# Patient Record
Sex: MALE | Race: WHITE | NOT HISPANIC OR LATINO | Employment: FULL TIME | URBAN - METROPOLITAN AREA
[De-identification: names, ages, dates, MRNs, and addresses within clinical notes are randomized per-mention and may not be internally consistent; named-entity substitution may affect disease eponyms.]

---

## 2017-01-09 ENCOUNTER — OFFICE VISIT (OUTPATIENT)
Dept: OPHTHALMOLOGY | Facility: CLINIC | Age: 37
End: 2017-01-09
Payer: COMMERCIAL

## 2017-01-09 DIAGNOSIS — E10.9 DIABETES TYPE 1, NO OCULAR INVOLVEMENT: ICD-10-CM

## 2017-01-09 DIAGNOSIS — E10.9 CONTROLLED DIABETES MELLITUS TYPE 1 WITHOUT COMPLICATIONS: Primary | ICD-10-CM

## 2017-01-09 DIAGNOSIS — H52.7 REFRACTIVE ERROR: ICD-10-CM

## 2017-01-09 PROCEDURE — 92015 DETERMINE REFRACTIVE STATE: CPT | Mod: S$GLB,,, | Performed by: OPTOMETRIST

## 2017-01-09 PROCEDURE — 92014 COMPRE OPH EXAM EST PT 1/>: CPT | Mod: S$GLB,,, | Performed by: OPTOMETRIST

## 2017-01-09 PROCEDURE — 99999 PR PBB SHADOW E&M-EST. PATIENT-LVL II: CPT | Mod: PBBFAC,,, | Performed by: OPTOMETRIST

## 2017-01-09 NOTE — MR AVS SNAPSHOT
"    [facility]  [address] [place] [address]  Phone: [phone]  Fax: [phone]                  Nino Arreguin   2017 3:30 PM   Office Visit    Description:  Male : 1980   Provider:  PATSY Lorenzo OD   Department:  [facility]           Reason for Visit     Diabetic Eye Exam           Diagnoses this Visit        Comments    Controlled diabetes mellitus type 1 without complications    -  Primary     Diabetes type 1, no ocular involvement         Refractive error                To Do List           Goals (5 Years of Data)     None      Follow-Up and Disposition     Return in about 1 year (around 2018).      [facility]     [facility] Nurse Care Line -  Assistance  Registered nurses in the [facility] provide clinical advisement, health education, appointment booking, and other advisory services.  Call for this free service at [phone].             Medications           Message regarding Medications     Verify the changes and/or additions to your medication regime listed below are the same as discussed with your clinician today.  If any of these changes or additions are incorrect, please notify your healthcare provider.             Verify that the below list of medications is an accurate representation of the medications you are currently taking.  If none reported, the list may be blank. If incorrect, please contact your healthcare provider. Carry this list with you in case of emergency.           Current Medications     blood sugar diagnostic (ONETOUCH ULTRA TEST) Strp 1 strip 8 times daily    insulin glargine (LANTUS) 100 unit/mL injection Inject 30 Units into the skin once daily.    insulin syringe-needle U-100 0.3 mL 30 x 5/16" Syrg 1 Syringe by Misc.(Non-Drug; Combo Route) route 6 (six) times daily.           Clinical Reference Information           Allergies as of 2017     No Known Allergies      Immunizations " Administered on Date of Encounter - 1/9/2017     None

## 2017-01-09 NOTE — PROGRESS NOTES
HPI     Last [facility] exam 04/25/2016  Diabetic/ 01/09/2017  Screening for glaucoma  CL update       Last edited by Mariano Gaines MA on 1/9/2017  3:28 PM.         Assessment /Plan     For exam results, see Encounter Report.    Controlled diabetes mellitus type 1 without complications    Diabetes type 1, no ocular involvement    Refractive error      No diabetic retinopathy in Type 1 diabetes patient.  OH OK OU.  Updated spec and CL RX.  RTC one yeaR.

## 2017-01-13 DIAGNOSIS — E11.9 TYPE 2 DIABETES MELLITUS WITHOUT COMPLICATION: ICD-10-CM

## 2017-07-28 RX ORDER — BLOOD SUGAR DIAGNOSTIC
STRIP MISCELLANEOUS
Qty: 250 STRIP | Refills: 3 | Status: SHIPPED | OUTPATIENT
Start: 2017-07-28 | End: 2018-01-23 | Stop reason: SDUPTHER

## 2017-11-10 ENCOUNTER — IMMUNIZATION (OUTPATIENT)
Dept: FAMILY MEDICINE | Facility: CLINIC | Age: 37
End: 2017-11-10
Payer: COMMERCIAL

## 2017-11-10 PROCEDURE — 90686 IIV4 VACC NO PRSV 0.5 ML IM: CPT | Mod: S$GLB,,, | Performed by: FAMILY MEDICINE

## 2017-11-10 PROCEDURE — 90471 IMMUNIZATION ADMIN: CPT | Mod: S$GLB,,, | Performed by: FAMILY MEDICINE

## 2017-11-29 DIAGNOSIS — J30.9 CHRONIC ALLERGIC RHINITIS, UNSPECIFIED SEASONALITY, UNSPECIFIED TRIGGER: Primary | ICD-10-CM

## 2017-12-04 ENCOUNTER — OFFICE VISIT (OUTPATIENT)
Dept: ALLERGY | Facility: CLINIC | Age: 37
End: 2017-12-04
Payer: COMMERCIAL

## 2017-12-04 VITALS
BODY MASS INDEX: 24.78 KG/M2 | HEART RATE: 74 BPM | HEIGHT: 75 IN | TEMPERATURE: 98 F | DIASTOLIC BLOOD PRESSURE: 74 MMHG | WEIGHT: 199.31 LBS | RESPIRATION RATE: 15 BRPM | SYSTOLIC BLOOD PRESSURE: 110 MMHG

## 2017-12-04 DIAGNOSIS — J30.89 ALLERGIC RHINITIS DUE TO DERMATOPHAGOIDES PTERONYSSINUS: ICD-10-CM

## 2017-12-04 DIAGNOSIS — H10.13 ALLERGIC CONJUNCTIVITIS OF BOTH EYES: ICD-10-CM

## 2017-12-04 DIAGNOSIS — J30.89 ALLERGIC RHINITIS DUE TO DERMATOPHAGOIDES FARINAE: ICD-10-CM

## 2017-12-04 DIAGNOSIS — J30.89 CHRONIC NONSEASONAL ALLERGIC RHINITIS DUE TO POLLEN: Primary | ICD-10-CM

## 2017-12-04 DIAGNOSIS — E10.9 CONTROLLED DIABETES MELLITUS TYPE 1 WITHOUT COMPLICATIONS: ICD-10-CM

## 2017-12-04 PROCEDURE — 95004 PERQ TESTS W/ALRGNC XTRCS: CPT | Mod: S$GLB,,, | Performed by: ALLERGY & IMMUNOLOGY

## 2017-12-04 PROCEDURE — 99999 PR PBB SHADOW E&M-EST. PATIENT-LVL III: CPT | Mod: PBBFAC,,, | Performed by: ALLERGY & IMMUNOLOGY

## 2017-12-04 PROCEDURE — 99204 OFFICE O/P NEW MOD 45 MIN: CPT | Mod: 25,S$GLB,, | Performed by: ALLERGY & IMMUNOLOGY

## 2017-12-04 RX ORDER — MONTELUKAST SODIUM 10 MG/1
10 TABLET ORAL DAILY
Qty: 30 TABLET | Refills: 12 | Status: SHIPPED | OUTPATIENT
Start: 2017-12-04 | End: 2018-01-03

## 2017-12-04 NOTE — LETTER
December 5, 2017      Lena Go MD  [address]  [place] [address]           [facility]  [address] [facility] [address]  [place] [address]  Phone: [phone]  Fax: [phone]          Patient: Nino Arreguin   MR Number: [id number]   YOB: 1980   Date of Visit: 12/4/2017       Dear Dr. Lena Go:    Thank you for referring Nino Arreguin to me for evaluation. Attached you will find relevant portions of my assessment and plan of care.    If you have questions, please do not hesitate to call me. I look forward to following Nino Arreguin along with you.    Sincerely,    Nils Ellington MD    Enclosure  CC:  No Recipients    If you would like to receive this communication electronically, please contact [email or web address] or [phone] to request more information on [employer] Link access.    For providers and/or their staff who would like to refer a patient to [facility], please contact us through our one-stop-shop provider referral line, [facility], at [phone].    If you feel you have received this communication in error or would no longer like to receive these types of communications, please e-mail [email or web address]

## 2017-12-05 NOTE — PROGRESS NOTES
Chief complaint:   ALLERGIES    This note was dictated using voice recognition software and may contain errors.    History:    He had a 2 PM appointment on Monday, December 4, 2017.  He stated that he has a lifelong history of perennial troublesome nasal symptoms.  Nasal obstruction is especially bothersome.  Typically he does not experience sensations of ear fullness or eustachian tube dysfunction.  He does experience itching of the eyes and excessive nasal mucus production.  He is suspicious that he is ALLERGIC to cats.  He does not believe that he is ALLERGIC to any foods.  Recently he has used Afrin nasal spray but not on a daily basis.  In the past he is taken Zyrtec daily and Claritin-D.  He is never evaluated Singulair.    Information in his medical record regarding his past medical history family history and social history was reviewed and updated today.  Significant additions if any are as noted above or below.    Social history: He lives in a house with 2 dogs indoors.  Carpeting in his present in his bedroom.  No one smokes indoors.  The house is not experienced any water damage.  He does not operate any room unit air filters, purifiers, or ionizers.  He works for a [employer].    Past medical history: He has no history of GE reflux nose or sinus surgery facial fractures asthma hypertension heart, liver, kidney or thyroid disease.  He stated that the age 15 he developed diabetes.  He received treatment with insulin.    Family history is positive for rhinitis and negative for asthma.    Review of systems: See above.  No additional new symptoms were mentioned at the time of his appointment this afternoon.  He stated in early childhood he believes he may have had some type of ALLERGY evaluation performed.  He does not recall the results.  He is interested in pursuing evaluation at this time.    Exam:    In general he is in no distress.  He is alert oriented well-developed well-groomed  attentive and in good mood  Gait steady  Skin no rash noted  Head no swelling noted  Eyes sclera white conjunctiva pink  Nose patent no polyps seen  Mouth no swelling or inflammation of the lips tongue or in the throat noted  Ears not inflamed tympanic membranes not inflamed  Neck no thyromegaly or masses noted  Lymph nodes no significant cervical or epitrochlear lymphadenopathy noted  Heart regular rhythm no murmurs heard  Lungs clear to auscultation  Extremities no swelling or inflammation of the hands or legs noted    ALLERGY testing:    He elected to have diagnostic immediate hypersensitivity skin tests performed.  Prick skin tests were performed on the volar forearms.  36 airborne allergens were tested along with histamine and saline.  These tests were personally evaluated and interpreted by myself 15 minutes after they were performed.  The histamine control was positive.  The saline control was negative.  Multiple positive skin test reactions were noted.  Cat and dog reacted positively as stated 2 different house dust mites and 6 grass pollens.  1 tree pollen and 2 weed pollens reacted equivocally.  I reviewed the results of the tests with him.    Impression:    #1 ALLERGIC rhinitis  #2 ALLERGIC conjunctivitis  #3 type 1 diabetes receiving treatment  #4 other health concerns as noted in his medical record    Assessment and plan:    Using anatomical teaching models of the nose and had I reviewed anatomy and pathophysiology with him.    Treatment options for ALLERGIC respiratory disease were discussed in detail.  I cautioned him not to use decongestant nasal sprays such as Afrin excessively.    Avoidance measures were discussed and emphasized.  We discussed dust mite avoidance in detail.  He was given printed material regarding dust mite avoidance measures.    The use of symptomatic medication was discussed.  Initially, I have suggested he evaluate the use of Singulair 10 mg 1 pill once a day.  Potential side  effects were reviewed.  At his request a prescription was issued and was transmitted electronically to his pharmacy.  He will take 1 pill once a day in the evening.  I requested that he call in 3 or 4 weeks and inform me as to whether or not he found the medication to be helpful.  Should Singulair not be helpful additional options regarding treatment may be made.    We discussed allergen immunotherapy.  Immunotherapy will not be instituted at this time.  It will remain potential treatment option to consider in particular should he continue to be symptomatic despite the use of appropriate medication.    His appointment was 50 minutes in duration spent entirely in face-to-face contact.  More than 50% of the visit was spent in counseling and coordination of care.  An additional 15 minutes were required for the performance and evaluation of his immediate hypersensitivity skin tests.    He was given the office phone number.  Should he have additional questions or concerns he was instructed to call.

## 2017-12-28 RX ORDER — FLUTICASONE PROPIONATE 50 MCG
2 SPRAY, SUSPENSION (ML) NASAL DAILY
Qty: 1 BOTTLE | Refills: 5 | Status: SHIPPED | OUTPATIENT
Start: 2017-12-28 | End: 2019-08-12

## 2018-07-12 RX ORDER — LANCETS 28 GAUGE
1 EACH MISCELLANEOUS
Qty: 300 EACH | Refills: 5 | Status: SHIPPED | OUTPATIENT
Start: 2018-07-12 | End: 2020-11-13

## 2018-07-12 RX ORDER — INSULIN PUMP SYRINGE, 3 ML
EACH MISCELLANEOUS
Qty: 1 EACH | Refills: 0 | Status: SHIPPED | OUTPATIENT
Start: 2018-07-12 | End: 2018-08-08

## 2018-08-07 ENCOUNTER — PATIENT MESSAGE (OUTPATIENT)
Dept: FAMILY MEDICINE | Facility: CLINIC | Age: 38
End: 2018-08-07

## 2018-08-08 RX ORDER — INSULIN PUMP SYRINGE, 3 ML
EACH MISCELLANEOUS
Qty: 1 EACH | Refills: 0 | Status: SHIPPED | OUTPATIENT
Start: 2018-08-08 | End: 2020-11-13

## 2018-08-14 ENCOUNTER — PATIENT MESSAGE (OUTPATIENT)
Dept: FAMILY MEDICINE | Facility: CLINIC | Age: 38
End: 2018-08-14

## 2018-12-04 ENCOUNTER — LAB VISIT (OUTPATIENT)
Dept: LAB | Facility: HOSPITAL | Age: 38
End: 2018-12-04
Payer: COMMERCIAL

## 2018-12-04 ENCOUNTER — OFFICE VISIT (OUTPATIENT)
Dept: FAMILY MEDICINE | Facility: CLINIC | Age: 38
End: 2018-12-04
Payer: COMMERCIAL

## 2018-12-04 VITALS
DIASTOLIC BLOOD PRESSURE: 74 MMHG | OXYGEN SATURATION: 98 % | HEART RATE: 69 BPM | RESPIRATION RATE: 18 BRPM | HEIGHT: 75 IN | BODY MASS INDEX: 25.25 KG/M2 | TEMPERATURE: 98 F | WEIGHT: 203.06 LBS | SYSTOLIC BLOOD PRESSURE: 110 MMHG

## 2018-12-04 DIAGNOSIS — Z00.00 PREVENTATIVE HEALTH CARE: Primary | ICD-10-CM

## 2018-12-04 DIAGNOSIS — E10.9 CONTROLLED DIABETES MELLITUS TYPE 1 WITHOUT COMPLICATIONS: ICD-10-CM

## 2018-12-04 DIAGNOSIS — Z00.00 PREVENTATIVE HEALTH CARE: ICD-10-CM

## 2018-12-04 LAB
ALBUMIN SERPL BCP-MCNC: 3.9 G/DL
ALP SERPL-CCNC: 73 U/L
ALT SERPL W/O P-5'-P-CCNC: 21 U/L
ANION GAP SERPL CALC-SCNC: 7 MMOL/L
AST SERPL-CCNC: 19 U/L
BASOPHILS # BLD AUTO: 0.04 K/UL
BASOPHILS NFR BLD: 0.7 %
BILIRUB SERPL-MCNC: 0.6 MG/DL
BUN SERPL-MCNC: 12 MG/DL
CALCIUM SERPL-MCNC: 9 MG/DL
CHLORIDE SERPL-SCNC: 103 MMOL/L
CHOLEST SERPL-MCNC: 189 MG/DL
CHOLEST/HDLC SERPL: 3.9 {RATIO}
CO2 SERPL-SCNC: 31 MMOL/L
CREAT SERPL-MCNC: 1 MG/DL
DIFFERENTIAL METHOD: NORMAL
EOSINOPHIL # BLD AUTO: 0.1 K/UL
EOSINOPHIL NFR BLD: 2 %
ERYTHROCYTE [DISTWIDTH] IN BLOOD BY AUTOMATED COUNT: 13 %
EST. GFR  (AFRICAN AMERICAN): >60 ML/MIN/1.73 M^2
EST. GFR  (NON AFRICAN AMERICAN): >60 ML/MIN/1.73 M^2
ESTIMATED AVG GLUCOSE: 143 MG/DL
GLUCOSE SERPL-MCNC: 113 MG/DL
HBA1C MFR BLD HPLC: 6.6 %
HCT VFR BLD AUTO: 45.1 %
HDLC SERPL-MCNC: 48 MG/DL
HDLC SERPL: 25.4 %
HGB BLD-MCNC: 14.7 G/DL
IMM GRANULOCYTES # BLD AUTO: 0.02 K/UL
IMM GRANULOCYTES NFR BLD AUTO: 0.3 %
LDLC SERPL CALC-MCNC: 124.8 MG/DL
LYMPHOCYTES # BLD AUTO: 2.3 K/UL
LYMPHOCYTES NFR BLD: 37.5 %
MCH RBC QN AUTO: 29.2 PG
MCHC RBC AUTO-ENTMCNC: 32.6 G/DL
MCV RBC AUTO: 90 FL
MONOCYTES # BLD AUTO: 0.5 K/UL
MONOCYTES NFR BLD: 7.8 %
NEUTROPHILS # BLD AUTO: 3.1 K/UL
NEUTROPHILS NFR BLD: 51.7 %
NONHDLC SERPL-MCNC: 141 MG/DL
NRBC BLD-RTO: 0 /100 WBC
PLATELET # BLD AUTO: 238 K/UL
PMV BLD AUTO: 12 FL
POTASSIUM SERPL-SCNC: 4 MMOL/L
PROT SERPL-MCNC: 7 G/DL
RBC # BLD AUTO: 5.04 M/UL
SODIUM SERPL-SCNC: 141 MMOL/L
TRIGL SERPL-MCNC: 81 MG/DL
TSH SERPL DL<=0.005 MIU/L-ACNC: 0.79 UIU/ML
WBC # BLD AUTO: 6.05 K/UL

## 2018-12-04 PROCEDURE — 99999 PR PBB SHADOW E&M-EST. PATIENT-LVL IV: CPT | Mod: PBBFAC,,, | Performed by: FAMILY MEDICINE

## 2018-12-04 PROCEDURE — 80061 LIPID PANEL: CPT

## 2018-12-04 PROCEDURE — 84443 ASSAY THYROID STIM HORMONE: CPT

## 2018-12-04 PROCEDURE — 80053 COMPREHEN METABOLIC PANEL: CPT

## 2018-12-04 PROCEDURE — 36415 COLL VENOUS BLD VENIPUNCTURE: CPT | Mod: PO

## 2018-12-04 PROCEDURE — 85025 COMPLETE CBC W/AUTO DIFF WBC: CPT

## 2018-12-04 PROCEDURE — 99395 PREV VISIT EST AGE 18-39: CPT | Mod: S$GLB,,, | Performed by: FAMILY MEDICINE

## 2018-12-04 PROCEDURE — 83036 HEMOGLOBIN GLYCOSYLATED A1C: CPT

## 2018-12-04 RX ORDER — LISINOPRIL 5 MG/1
5 TABLET ORAL DAILY
Qty: 90 TABLET | Refills: 3 | Status: SHIPPED | OUTPATIENT
Start: 2018-12-04 | End: 2021-09-14 | Stop reason: SDUPTHER

## 2018-12-04 NOTE — PROGRESS NOTES
CHIEF COMPLAINT:  This is a 38-year-old male here for preventive health exam.    SUBJECTIVE: Patient is a type I diabetic with onset at age 16.  A1c 3 days ago was  6.7% 2 years ago.  Patient denies polyuria, polydipsia, polyphagia.  He reports higher blood sugars in the morning.  His weight is essentially unchanged with a BMI of 25.38.  He uses basal and bolus insulin.  He exercises regularly.    Eye exam January 2017.  Tdap April 2016.  Pneumovax November 1999.  Influenza vaccine September 2018.    ROS:  GENERAL:  Patient denies fever, chills, night sweats.  Patient denies weight gain or loss. Patient denies anorexia, fatigue, weakness or swollen glands.  SKIN: Patient denies rash or hair loss.  HEENT:  Patient denies sore throat, ear pain, hearing loss, nasal congestion, or runny nose. Patient denies visual disturbance, eye irritation or discharge.  LUNGS: Patient denies cough, wheeze or hemoptysis.  CARDIOVASCULAR: Patient denies chest pain, shortness of breath, palpitations, syncope or lower extremity edema.  GI:  Patient denies abdominal pain, nausea, vomiting, diarrhea, constipation, blood in stool or melena.  GENITOURINARY:  Patient denies dysuria, frequency, hematuria, nocturia, urgency or incontinence.  MUSCULOSKELETAL: Patient denies joint pain, swelling, redness or warmth.  NEUROLOGIC: Patient denies headache, vertigo, paresthesias, weakness in limb, dysarthria, dysphagia or abnormality of gait.  PSYCHIATRIC: Patient denies anxiety, depression, or memory loss.     OBJECTIVE:   GENERAL:  Well-developed well-nourished male alert and oriented x3, in no acute distress.  Memory, judgment and cognition without deficit.   SKIN: Clear without rash.  Normal color and tone.  HEENT: Eyes: Clear conjunctivae.  No scleral icterus.  Pupils equal reactive to light and accommodation.  Ears: Clear canals. Clear TMs.  Nose: Without congestion.  Pharynx: Without injection or exudates.  NECK: Supple, normal range of motion.   No masses, nodes or enlarged thyroid.  No JVD.  Carotids 2+ and equal.  No bruits.  LUNGS: Clear to auscultation.  Normal respiratory effort.  CARDIOVASCULAR: Regular rhythm, normal S1, S2 without murmur, gallop or rub.  BACK: No CVA or spinal tenderness.  ABDOMEN: Normal appearance.  Active bowel sounds.  Soft, nontender without mass or organomegaly.  No rebound or guarding.  EXTREMITIES: Without cyanosis, clubbing or edema.  Distal pulses 2+ and equal.  Normal range of motion in all extremities.  No joint effusion, erythema or warmth.  NEUROLOGIC:   Cranial nerves II through XII without deficit.  Motor strength equal bilaterally.  Sensation normal to touch.  Deep tendon reflexes 2+ and equal.  Gait without abnormality.  No tremor.  Negative cerebellar signs.    FOOT EVALUATION: 10 gram monofilament exam with protective sensation intact bilaterally. Nails appropriately trimmed. No ulcers. Distal pulses palpable.     ASSESSMENT:  1. Preventative health care    2. Controlled diabetes mellitus type 1 without complications      PLAN:   1.  Weight reduction.  Exercise regularly.  2.  Age-appropriate counseling.  3.  Fasting lab.  4.  Start lisinopril 5 mg daily.  5.  Patient to find out about coverage for CGM.    6.  Follow-up in 6 months.

## 2018-12-10 ENCOUNTER — OFFICE VISIT (OUTPATIENT)
Dept: OPHTHALMOLOGY | Facility: CLINIC | Age: 38
End: 2018-12-10
Payer: COMMERCIAL

## 2018-12-10 DIAGNOSIS — H52.13 MYOPIA OF BOTH EYES: ICD-10-CM

## 2018-12-10 DIAGNOSIS — E10.9 DIABETES TYPE 1, NO OCULAR INVOLVEMENT: Primary | ICD-10-CM

## 2018-12-10 DIAGNOSIS — Z13.5 GLAUCOMA SCREENING: ICD-10-CM

## 2018-12-10 PROCEDURE — 92014 COMPRE OPH EXAM EST PT 1/>: CPT | Mod: S$GLB,,, | Performed by: OPTOMETRIST

## 2018-12-10 PROCEDURE — 92015 DETERMINE REFRACTIVE STATE: CPT | Mod: S$GLB,,, | Performed by: OPTOMETRIST

## 2018-12-10 PROCEDURE — 99999 PR PBB SHADOW E&M-EST. PATIENT-LVL II: CPT | Mod: PBBFAC,,, | Performed by: OPTOMETRIST

## 2018-12-10 NOTE — PROGRESS NOTES
HPI     Diabetic Eye Exam      Additional comments: Yearly              Comments     NP to [facility]  Last seen by [facility] on 1/9/17 for yearly DM eye exam  No noticeable changes in vision since last eye exam  Wears CTL full-time   Current CTL are comfortable vision stable  Blood sugar stable  No other complaints  No drops    1. DM  2. CTL wearer          Last edited by Melody Weathers, PCT on 12/10/2018 10:14 AM.   (History)            Assessment /Plan     For exam results, see Encounter Report.    Diabetes type 1, no ocular involvement    Glaucoma screening    Myopia of both eyes      No diabetic retinopathy in either eye.  Glaucoma screening negative in both eyes.  Updated glasses and contact lens prescriptions.  Return to clinic 1 yr.

## 2018-12-10 NOTE — LETTER
December 10, 2018      Lena Go MD  [address]  [place] [address]           [facility]  [address]  [place] [address]  Phone: [phone]  Fax: [phone]          Patient: Nino Arreguin   MR Number: [id number]   YOB: 1980   Date of Visit: 12/10/2018       Dear Dr. Lena Go:    Thank you for referring Nino Arreguin to me for evaluation. Attached you will find relevant portions of my assessment and plan of care.    If you have questions, please do not hesitate to call me. I look forward to following Nino Arreguin along with you.    Sincerely,    Ketty Lorenzo, OD    Enclosure  CC:  No Recipients    If you would like to receive this communication electronically, please contact [email or web address] or [phone] to request more information on [employer] Link access.    For providers and/or their staff who would like to refer a patient to [facility], please contact us through our one-stop-shop provider referral line, [facility], at [phone].    If you feel you have received this communication in error or would no longer like to receive these types of communications, please e-mail [email or web address]

## 2019-02-25 RX ORDER — OSELTAMIVIR PHOSPHATE 75 MG/1
75 CAPSULE ORAL DAILY
Qty: 7 CAPSULE | Refills: 0 | Status: SHIPPED | OUTPATIENT
Start: 2019-02-25 | End: 2019-03-04

## 2019-06-14 ENCOUNTER — PATIENT OUTREACH (OUTPATIENT)
Dept: ADMINISTRATIVE | Facility: HOSPITAL | Age: 39
End: 2019-06-14

## 2019-06-14 NOTE — PROGRESS NOTES
I have attempted without success to contact this patient re  Diabetic F/U. No answer, Left         Abbi FERNANDEZ, CUCAN Care Coordinator  Care Coordination Department  [facility]  [phone]

## 2019-06-26 RX ORDER — PROMETHAZINE HYDROCHLORIDE AND CODEINE PHOSPHATE 6.25; 1 MG/5ML; MG/5ML
5 SOLUTION ORAL EVERY 4 HOURS PRN
Qty: 180 ML | Refills: 0
Start: 2019-06-26 | End: 2021-05-20 | Stop reason: SDUPTHER

## 2019-07-08 ENCOUNTER — OFFICE VISIT (OUTPATIENT)
Dept: FAMILY MEDICINE | Facility: CLINIC | Age: 39
End: 2019-07-08
Payer: COMMERCIAL

## 2019-07-08 ENCOUNTER — HOSPITAL ENCOUNTER (OUTPATIENT)
Dept: RADIOLOGY | Facility: HOSPITAL | Age: 39
Discharge: HOME | End: 2019-07-08
Attending: FAMILY MEDICINE
Payer: COMMERCIAL

## 2019-07-08 VITALS
HEART RATE: 72 BPM | TEMPERATURE: 98 F | SYSTOLIC BLOOD PRESSURE: 122 MMHG | WEIGHT: 199.94 LBS | HEIGHT: 75 IN | DIASTOLIC BLOOD PRESSURE: 88 MMHG | BODY MASS INDEX: 24.86 KG/M2 | OXYGEN SATURATION: 98 %

## 2019-07-08 DIAGNOSIS — R05.9 COUGH: ICD-10-CM

## 2019-07-08 DIAGNOSIS — R05.9 COUGH: Primary | ICD-10-CM

## 2019-07-08 PROCEDURE — 71046 X-RAY EXAM CHEST 2 VIEWS: CPT | Mod: TC,FY,PO

## 2019-07-08 PROCEDURE — 99213 PR OFFICE/OUTPT VISIT, EST, LEVL III, 20-29 MIN: ICD-10-PCS | Mod: S$GLB,,, | Performed by: FAMILY MEDICINE

## 2019-07-08 PROCEDURE — 71046 X-RAY EXAM CHEST 2 VIEWS: CPT | Mod: 26,,, | Performed by: RADIOLOGY

## 2019-07-08 PROCEDURE — 99999 PR PBB SHADOW E&M-EST. PATIENT-LVL III: ICD-10-PCS | Mod: PBBFAC,,, | Performed by: FAMILY MEDICINE

## 2019-07-08 PROCEDURE — 3008F BODY MASS INDEX DOCD: CPT | Mod: CPTII,S$GLB,, | Performed by: FAMILY MEDICINE

## 2019-07-08 PROCEDURE — 99999 PR PBB SHADOW E&M-EST. PATIENT-LVL III: CPT | Mod: PBBFAC,,, | Performed by: FAMILY MEDICINE

## 2019-07-08 PROCEDURE — 99213 OFFICE O/P EST LOW 20 MIN: CPT | Mod: S$GLB,,, | Performed by: FAMILY MEDICINE

## 2019-07-08 PROCEDURE — 3008F PR BODY MASS INDEX (BMI) DOCUMENTED: ICD-10-PCS | Mod: CPTII,S$GLB,, | Performed by: FAMILY MEDICINE

## 2019-07-08 PROCEDURE — 71046 XR CHEST PA AND LATERAL: ICD-10-PCS | Mod: 26,,, | Performed by: RADIOLOGY

## 2019-07-08 RX ORDER — PREDNISONE 20 MG/1
TABLET ORAL
Qty: 10 TABLET | Refills: 0 | Status: SHIPPED | OUTPATIENT
Start: 2019-07-08 | End: 2019-08-12

## 2019-07-08 RX ORDER — MONTELUKAST SODIUM 10 MG/1
10 TABLET ORAL NIGHTLY
Qty: 30 TABLET | Refills: 2 | Status: SHIPPED | OUTPATIENT
Start: 2019-07-08 | End: 2020-07-17

## 2019-07-08 NOTE — PROGRESS NOTES
CHIEF COMPLAINT:  This is a 38-year-old male complaining of persistent cough.    SUBJECTIVE:  Patient complains of a 4-5 week history of cough starting with postnasal drip which has been unrelenting.  Symptoms wax and wane within a 24 hr period.  Cough is nonproductive.  Recently patient has been having slight chest discomfort with coughing.  The cough is better at night when he lies down.  Patient denies heartburn or reflux.  He has no coughing while exercising.  Patient denies runny nose, nasal congestion, sore throat, ear pain, chest congestion, shortness of breath or wheezing.  He  has a history of allergic rhinitis.  He has been taking Zyrtec without relief.  He was seen at [facility] 1-1[facility]2 weeks ago and given a steroid injection which made virtually no difference in his symptoms.  He has also been taking Tessalon Perles and promethazine with codeine with temporary relief.  The patient has type 1 diabetes.    ROS:  GENERAL: Patient denies fever, chills, night sweats.  Patient denies weight gain or loss. Patient denies anorexia, fatigue, weakness or swollen glands.  SKIN: Patient denies rash.  HEENT: Patient denies sore throat, ear pain, hearing loss, nasal congestion, or runny nose. Patient denies visual disturbance, eye irritation or discharge.  LUNGS: Patient denies wheeze or hemoptysis.  Positive for cough.    CARDIOVASCULAR: Patient denies chest pain, shortness of breath, palpitations, syncope or lower extremity edema.  GI: Patient denies abdominal pain, nausea, vomiting, diarrhea, constipation, blood in stool or melena.    OBJECTIVE:   GENERAL: Well-developed well-nourished white male alert and oriented x3 in no acute distress.  Memory, judgment and cognition without deficit.  SKIN: Clear without rash.  Normal color and tone.  HEENT: Eyes: Clear conjunctivae.  No scleral icterus.  Ears: Clear canals.  Clear TMs.  Nose: Without congestion.  Pharynx: Without injection or exudates.  NECK: Supple,  normal range of motion.  No lymphadenopathy.  No masses or enlarged thyroid.  No JVD.  Carotids 2+ and equal.  No bruits.  LUNGS: Clear to auscultation.  Normal respiratory effort.  CARDIOVASCULAR: Regular rhythm, normal S1, S2 without murmur, gallop or rub.    Chest x-ray:  Clear.    ASSESSMENT:  1. Cough      PLAN:   1.  Singular 10 mg daily.  2.  Short prednisone taper starting at 40 mg over 1 week.  3.  Cautioned regarding elevation of blood sugar due to steroids.  Adjust insulin dose according to blood sugar.  4.  Continue Zyrtec.  5.  Follow-up if no improvement or worsening symptoms.    This note is generated with speech recognition software and is subject to transcription error and sound alike phrases that may be missed by proofreading.

## 2019-07-12 ENCOUNTER — PATIENT OUTREACH (OUTPATIENT)
Dept: ADMINISTRATIVE | Facility: HOSPITAL | Age: 39
End: 2019-07-12

## 2019-07-12 DIAGNOSIS — E10.9 CONTROLLED DIABETES MELLITUS TYPE 1 WITHOUT COMPLICATIONS: Primary | ICD-10-CM

## 2019-07-12 NOTE — PROGRESS NOTES
Pt States he on Vacation and will schedule when he return         Abbi FERNANDEZ LPN Care Coordinator  Care Coordination Department  [facility]  [phone]

## 2019-07-23 ENCOUNTER — TELEPHONE (OUTPATIENT)
Dept: FAMILY MEDICINE | Facility: CLINIC | Age: 39
End: 2019-07-23

## 2019-07-23 NOTE — TELEPHONE ENCOUNTER
----- Message from Anisha Jernigan MA sent at 7/23/2019 12:23 PM CDT -----  Contact: Casi Wren      ----- Message -----  From: Polly Severino LPN  Sent: 7/22/2019   4:43 PM  To: Anisha Jernigan MA, Romulo Torres LPN        ----- Message -----  From: Estelle Duggan  Sent: 7/22/2019  12:27 PM  To: [facility] Staff    They are missing certificate of med necessity and chart notes, please fax them to [phone]. Thank you

## 2019-07-24 ENCOUNTER — HOSPITAL ENCOUNTER (OUTPATIENT)
Dept: RADIOLOGY | Facility: HOSPITAL | Age: 39
Discharge: HOME | End: 2019-07-24
Attending: ALLERGY & IMMUNOLOGY
Payer: COMMERCIAL

## 2019-07-24 ENCOUNTER — OFFICE VISIT (OUTPATIENT)
Dept: ALLERGY | Facility: CLINIC | Age: 39
End: 2019-07-24
Payer: COMMERCIAL

## 2019-07-24 VITALS
TEMPERATURE: 97 F | HEART RATE: 74 BPM | RESPIRATION RATE: 15 BRPM | SYSTOLIC BLOOD PRESSURE: 110 MMHG | WEIGHT: 200.19 LBS | HEIGHT: 75 IN | DIASTOLIC BLOOD PRESSURE: 80 MMHG | BODY MASS INDEX: 24.89 KG/M2

## 2019-07-24 DIAGNOSIS — R05.9 COUGH: ICD-10-CM

## 2019-07-24 DIAGNOSIS — J30.1 SEASONAL ALLERGIC RHINITIS DUE TO POLLEN: ICD-10-CM

## 2019-07-24 DIAGNOSIS — J30.1 NON-SEASONAL ALLERGIC RHINITIS DUE TO POLLEN: ICD-10-CM

## 2019-07-24 DIAGNOSIS — R05.9 COUGH: Primary | ICD-10-CM

## 2019-07-24 PROCEDURE — 99999 PR PBB SHADOW E&M-EST. PATIENT-LVL III: ICD-10-PCS | Mod: PBBFAC,,, | Performed by: ALLERGY & IMMUNOLOGY

## 2019-07-24 PROCEDURE — 70220 X-RAY EXAM OF SINUSES: CPT | Mod: TC

## 2019-07-24 PROCEDURE — 99999 PR PBB SHADOW E&M-EST. PATIENT-LVL III: CPT | Mod: PBBFAC,,, | Performed by: ALLERGY & IMMUNOLOGY

## 2019-07-24 PROCEDURE — 99215 OFFICE O/P EST HI 40 MIN: CPT | Mod: S$GLB,,, | Performed by: ALLERGY & IMMUNOLOGY

## 2019-07-24 PROCEDURE — 3008F BODY MASS INDEX DOCD: CPT | Mod: CPTII,S$GLB,, | Performed by: ALLERGY & IMMUNOLOGY

## 2019-07-24 PROCEDURE — 99215 PR OFFICE/OUTPT VISIT, EST, LEVL V, 40-54 MIN: ICD-10-PCS | Mod: S$GLB,,, | Performed by: ALLERGY & IMMUNOLOGY

## 2019-07-24 PROCEDURE — 3008F PR BODY MASS INDEX (BMI) DOCUMENTED: ICD-10-PCS | Mod: CPTII,S$GLB,, | Performed by: ALLERGY & IMMUNOLOGY

## 2019-07-24 PROCEDURE — 70220 XR SINUSES MIN 3 VIEWS: ICD-10-PCS | Mod: 26,,, | Performed by: RADIOLOGY

## 2019-07-24 PROCEDURE — 70220 X-RAY EXAM OF SINUSES: CPT | Mod: 26,,, | Performed by: RADIOLOGY

## 2019-07-25 ENCOUNTER — TELEPHONE (OUTPATIENT)
Dept: ALLERGY | Facility: CLINIC | Age: 39
End: 2019-07-25

## 2019-07-25 ENCOUNTER — TELEPHONE (OUTPATIENT)
Dept: FAMILY MEDICINE | Facility: CLINIC | Age: 39
End: 2019-07-25

## 2019-07-25 ENCOUNTER — TELEPHONE (OUTPATIENT)
Dept: ALLERGY | Facility: CLINIC | Age: 39
End: 2019-07-25
Payer: COMMERCIAL

## 2019-07-25 NOTE — PROGRESS NOTES
Chief complaint:  Cough, allergies    This note was dictated using voice recognition software and may contain errors.    History:  He had a 4:00 p.m. appointment on Wednesday July 24, 2019.  He was accompanied by his wife.  She was present throughout the entire appointment.    He had an appointment with me on December 4, 2017.  Information in that note was reviewed and updated today.  Please refer to the results of immediate hypersensitivity prick skin tests performed December 4, 2017.  He was allergic to multiple grass pollens in 2 house dust mites plus additional allergens.  We reviewed the results of his tests.    Information in his medical record regarding his past medical history, family history, and social history was reviewed and updated today.  Significant addition see if any are as noted below.    He stated approximately Carly 10 he became ill.  He developed a sore throat and posterior rhinorrhea.  He has been symptomatic since that time with the exception of perhaps 1 or 2 days when he felt somewhat improved.  He stated when he improved he was on vacation in [place].  His coughing decreased only to redeveloped.    He is received treatment at an urgent care facility June 21.  He received intramuscular corticosteroids an oral medicine and continued to be symptomatic.  Subsequently he was seen on July 8 by .  He received additional treatment with prednisone and continues to be symptomatic.  A chest x-ray was performed which was satisfactory.    Review of systems:  At the time of his appointment this afternoon he does experience some coughing.  He stated in 2018 and 2019 when he has been well that he does experience symptoms which she attributes to his allergic sensitivities.    In December of 2017 I had suggested he evaluate Singulair.  He cannot recall with certainty if he took Singulair or if he did whether not he found to be helpful.    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    Past medical history:  He has diabetes and is receiving treatment.  He has no history of asthma.    Exam:    In general he is in no distress.  He is alert oriented well-developed in good mood and attentive  Gait steady  Skin no rash noted  Head no swelling noted  Eyes scleral white conjunctiva pink  Nose patent no polyp seen  Mouth no swelling or inflammation of the lips tongue or in the throat noted  Ears not inflamed tympanic membranes not inflamed  Neck no masses or thyromegaly noted  Lymph nodes no significant cervical or epitrochlear lymphadenopathy noted  Heart no murmurs heard regular rhythm  Lungs clear to auscultation.  Good air exchange.  No wheezing or abnormal sounds heard    Extremities no swelling or inflammation of the hands legs noted    Impression:    1.  Cough, exact cause uncertain, perhaps multifactorial in origin  2.  Allergic rhinitis  3.  Diabetes, receiving treatment  4.  Other health concerns as noted in his medical record    Assessment plan:    I told him I am concerned that he may have developed sinusitis.  I recommended the sinus x-rays be performed.  Arrangements were made to have x-rays performed after his appointment with me today.  When the results are available to review with him I will do so.  Additional recommendations may be made at that time.    I told him it is not uncommon to experience the development of sinusitis after an upper respiratory tract infectious illness.  On Carly 10 he experienced the onset of a sore throat an increase in posterior rhinorrhea.    He stated he is interested in considering additional treatment for his allergic respiratory symptoms.  We discussed options for treatment.  We discussed the use of symptomatic medication.  We discussed oral and subcutaneous immuno therapy.  Potential benefits potential risks associated with immunotherapy were discussed.  He was informed that fatalities have occurred with systemic  allergic reactions occurring in association with the administration of allergen immunotherapy injections.    Additional recommendations regarding treatment will be made once results of the sinus x-rays are known.    He was given the office phone number.  Should he have additional questions or concerns she was instructed to call.    His appointment was 40 min in duration spent entirely in face-to-face contact.  More than 50% of the visit was spent in counseling and coordination of care.

## 2019-07-25 NOTE — TELEPHONE ENCOUNTER
I called him at 6:19 p.m. on Thursday July 25, 2019.  I was calling to review the report of the sinus x-rays performed after his appointment with me yesterday.  I did not reach him and did not speak with him.

## 2019-07-25 NOTE — TELEPHONE ENCOUNTER
----- Message from Naida Peace sent at 7/25/2019  9:05 AM CDT -----  Contact: Mrs. Arreguin-Spouse   Mrs. Arreguin requesting a call back regarding  a needed procedure code for Nino Arreguin. Please call back at [phone].      Thanks,  Naida Peace  7/25/2019

## 2019-07-25 NOTE — TELEPHONE ENCOUNTER
Faxed patient's [facility] Supp[employer] paperwork back to [phone], along with current medication list, problem list with diagnosis codes, and last chart note from annual exam in December 2018./LA

## 2019-07-25 NOTE — TELEPHONE ENCOUNTER
Called [facility], [facility] was unavailable. I left a message advising that the patient's completed form has been faxed back over./DENIAW    ----- Message from Margie Barros sent at 7/25/2019  8:40 AM CDT -----  Contact: [facility])-[phone]  Would like to consult with nurse regarding a fax sent and recieved for a meter, please call back at [phone]. Thanks/ar

## 2019-07-26 ENCOUNTER — TELEPHONE (OUTPATIENT)
Dept: ALLERGY | Facility: CLINIC | Age: 39
End: 2019-07-26

## 2019-07-26 DIAGNOSIS — J30.1 ALLERGIC RHINITIS DUE TO POLLEN, UNSPECIFIED SEASONALITY: Primary | ICD-10-CM

## 2019-07-26 RX ORDER — AZELASTINE 1 MG/ML
1 SPRAY, METERED NASAL 2 TIMES DAILY
Qty: 30 ML | Refills: 3 | Status: SHIPPED | OUTPATIENT
Start: 2019-07-26 | End: 2022-03-31

## 2019-07-26 NOTE — TELEPHONE ENCOUNTER
I completed my telephone conversation with him at 2:46 p.m. on July 26, 2019.  I informed him of the normal results of his sinus x-rays performed on July 24.    He stated he has not taken any Lisinopril since January 2019.  I reviewed with him that Ace inhibitors, such as Lisinopril, can cause a cough.  Again, he has not been taking this medication in recent months.    He stated he has observed when he goes to bed and is flat on his back that his cough stops occurring.  He stated coughing through the evening and night when in bed is not a problem.  He stated upon awakening in the morning in getting out of bed that the cough will redeveloped.  We discussed potential anatomical concerns which conceivably could contribute to causing an individual to experience coughing.    I discussed with him that Astelin nasal spray may be helpful for treating nasal symptoms and cough.  He has requested a prescription be transmitted to his pharmacy.  This has been done.  He may use 1 or 2 sprays in each nasal passage as often as every 12 hr.  I reviewed with him guidelines for using the medicine.  Potential side effects were discussed.    We discussed cough variant asthma.  Should he continue to be symptomatic with cough I told him I will recommend that pulmonary function tests be performed.    Should he continue to cough I told him it may be appropriate to recommended a chest x-ray be repeated.    I requested he call July 29 or 30 and report upon his status.    This note was dictated using voice recognition software and may contain errors.

## 2019-07-29 ENCOUNTER — TELEPHONE (OUTPATIENT)
Dept: FAMILY MEDICINE | Facility: CLINIC | Age: 39
End: 2019-07-29

## 2019-07-29 NOTE — TELEPHONE ENCOUNTER
Spoke with robert and she states that pt needs to have a sooner appt for diabetes in order for him to get supplies. Pt scheduled for 8/12/19.

## 2019-07-29 NOTE — TELEPHONE ENCOUNTER
----- Message from Ciara Pittman sent at 7/29/2019  3:50 PM CDT -----  Contact: robert-diabetes management supplies  Type:  Needs Medical Advice    Who Called: robert  Symptoms (please be specific): n/a   How long has patient had these symptoms: n/a  Pharmacy name and phone #: n/a  Would the patient rather a call back or a response via [facility]? Call back  Best Call Back Number: [phone]  Additional Information: requesting call back regarding getting update on pt documents that was requested.    Thanks,  Ciara Pittman

## 2019-07-30 ENCOUNTER — PATIENT MESSAGE (OUTPATIENT)
Dept: ALLERGY | Facility: CLINIC | Age: 39
End: 2019-07-30

## 2019-07-30 ENCOUNTER — TELEPHONE (OUTPATIENT)
Dept: ALLERGY | Facility: CLINIC | Age: 39
End: 2019-07-30

## 2019-07-30 NOTE — TELEPHONE ENCOUNTER
I finished my telephone conversation with him at 5:44 p.m. on July 19.  He he stated Astelin is helpful.  He stated coughing has been greatly reduced since Saturday July 27.  He continues to experience some nasal obstruction.  I suggested he continue to use the medicine along with Singulair.  I requested he call August 6 or 7 and upon his status.    He is giving some consideration to instituting immunotherapy.  We discussed immunotherapy.  We its administration in frequency of administration.    Should have additional questions or concerns she was instructed to call.

## 2019-08-05 ENCOUNTER — PATIENT OUTREACH (OUTPATIENT)
Dept: ADMINISTRATIVE | Facility: HOSPITAL | Age: 39
End: 2019-08-05

## 2019-08-05 ENCOUNTER — TELEPHONE (OUTPATIENT)
Dept: FAMILY MEDICINE | Facility: CLINIC | Age: 39
End: 2019-08-05

## 2019-08-05 DIAGNOSIS — E10.9 CONTROLLED DIABETES MELLITUS TYPE 1 WITHOUT COMPLICATIONS: Primary | ICD-10-CM

## 2019-08-05 NOTE — TELEPHONE ENCOUNTER
Told by Chantelle that patient's medical necessity form was missing date of last visit, method of delivery, if the pt has had diabetes education within the last 6 months, and the prescription start date. For the chart notes they are missing 4x testing, 3 multiple daily injections, and if the patient has hypoglycemia. She will fax over the completed form to us to correct and send back with updated chart notes./LA    ----- Message from Leonor Nolan sent at 8/5/2019  1:15 PM CDT -----  Contact: Bon/Diabetic Mgmt Supplies  Please call Bon @ [phone], Ext [phone], need to know status of pt chart notes,medical necessity complete by doctor.

## 2019-08-05 NOTE — PROGRESS NOTES
PreVisit Chart Audit Perfomed       Abbi FERNANDEZ LPN Care Coordinator  Care Coordination Department  [facility]  [phone]

## 2019-08-07 ENCOUNTER — TELEPHONE (OUTPATIENT)
Dept: FAMILY MEDICINE | Facility: CLINIC | Age: 39
End: 2019-08-07

## 2019-08-09 ENCOUNTER — TELEPHONE (OUTPATIENT)
Dept: FAMILY MEDICINE | Facility: CLINIC | Age: 39
End: 2019-08-09

## 2019-08-09 NOTE — TELEPHONE ENCOUNTER
----- Message from Shelia Coles sent at 8/9/2019  9:55 AM CDT -----  Contact: [facility] Management [facility] Supplies  Di[facility] Supplies is calling checking to status to confirm that the fax was received  On incomplete medical necessity statement and chart notes fax on pt. Ph#[phone]   [id number]           .Thank You  Tabby Coles

## 2019-08-12 ENCOUNTER — OFFICE VISIT (OUTPATIENT)
Dept: FAMILY MEDICINE | Facility: CLINIC | Age: 39
End: 2019-08-12
Payer: COMMERCIAL

## 2019-08-12 ENCOUNTER — LAB VISIT (OUTPATIENT)
Dept: LAB | Facility: HOSPITAL | Age: 39
End: 2019-08-12
Payer: COMMERCIAL

## 2019-08-12 ENCOUNTER — TELEPHONE (OUTPATIENT)
Dept: FAMILY MEDICINE | Facility: CLINIC | Age: 39
End: 2019-08-12

## 2019-08-12 VITALS
TEMPERATURE: 98 F | SYSTOLIC BLOOD PRESSURE: 128 MMHG | HEIGHT: 75 IN | HEART RATE: 78 BPM | DIASTOLIC BLOOD PRESSURE: 88 MMHG | BODY MASS INDEX: 24.91 KG/M2 | OXYGEN SATURATION: 97 % | WEIGHT: 200.38 LBS

## 2019-08-12 DIAGNOSIS — J30.89 NON-SEASONAL ALLERGIC RHINITIS, UNSPECIFIED TRIGGER: ICD-10-CM

## 2019-08-12 DIAGNOSIS — E10.65 UNCONTROLLED TYPE 1 DIABETES MELLITUS WITH HYPERGLYCEMIA: Primary | ICD-10-CM

## 2019-08-12 DIAGNOSIS — E10.9 CONTROLLED DIABETES MELLITUS TYPE 1 WITHOUT COMPLICATIONS: ICD-10-CM

## 2019-08-12 LAB
ANION GAP SERPL CALC-SCNC: 7 MMOL/L (ref 8–16)
BUN SERPL-MCNC: 17 MG/DL (ref 6–20)
CALCIUM SERPL-MCNC: 10 MG/DL (ref 8.7–10.5)
CHLORIDE SERPL-SCNC: 103 MMOL/L (ref 95–110)
CHOLEST SERPL-MCNC: 226 MG/DL (ref 120–199)
CHOLEST/HDLC SERPL: 4.5 {RATIO} (ref 2–5)
CO2 SERPL-SCNC: 31 MMOL/L (ref 23–29)
CREAT SERPL-MCNC: 1 MG/DL (ref 0.5–1.4)
EST. GFR  (AFRICAN AMERICAN): >60 ML/MIN/1.73 M^2
EST. GFR  (NON AFRICAN AMERICAN): >60 ML/MIN/1.73 M^2
ESTIMATED AVG GLUCOSE: 169 MG/DL (ref 68–131)
GLUCOSE SERPL-MCNC: 74 MG/DL (ref 70–110)
HBA1C MFR BLD HPLC: 7.5 % (ref 4–5.6)
HDLC SERPL-MCNC: 50 MG/DL (ref 40–75)
HDLC SERPL: 22.1 % (ref 20–50)
LDLC SERPL CALC-MCNC: 153.6 MG/DL (ref 63–159)
NONHDLC SERPL-MCNC: 176 MG/DL
POTASSIUM SERPL-SCNC: 4.3 MMOL/L (ref 3.5–5.1)
SODIUM SERPL-SCNC: 141 MMOL/L (ref 136–145)
TRIGL SERPL-MCNC: 112 MG/DL (ref 30–150)

## 2019-08-12 PROCEDURE — 99999 PR PBB SHADOW E&M-EST. PATIENT-LVL III: ICD-10-PCS | Mod: PBBFAC,,, | Performed by: FAMILY MEDICINE

## 2019-08-12 PROCEDURE — 99214 OFFICE O/P EST MOD 30 MIN: CPT | Mod: S$GLB,,, | Performed by: FAMILY MEDICINE

## 2019-08-12 PROCEDURE — 3044F HG A1C LEVEL LT 7.0%: CPT | Mod: CPTII,S$GLB,, | Performed by: FAMILY MEDICINE

## 2019-08-12 PROCEDURE — 80048 BASIC METABOLIC PNL TOTAL CA: CPT

## 2019-08-12 PROCEDURE — 99214 PR OFFICE/OUTPT VISIT, EST, LEVL IV, 30-39 MIN: ICD-10-PCS | Mod: S$GLB,,, | Performed by: FAMILY MEDICINE

## 2019-08-12 PROCEDURE — 3008F PR BODY MASS INDEX (BMI) DOCUMENTED: ICD-10-PCS | Mod: CPTII,S$GLB,, | Performed by: FAMILY MEDICINE

## 2019-08-12 PROCEDURE — 80061 LIPID PANEL: CPT

## 2019-08-12 PROCEDURE — 3044F PR MOST RECENT HEMOGLOBIN A1C LEVEL <7.0%: ICD-10-PCS | Mod: CPTII,S$GLB,, | Performed by: FAMILY MEDICINE

## 2019-08-12 PROCEDURE — 99999 PR PBB SHADOW E&M-EST. PATIENT-LVL III: CPT | Mod: PBBFAC,,, | Performed by: FAMILY MEDICINE

## 2019-08-12 PROCEDURE — 36415 COLL VENOUS BLD VENIPUNCTURE: CPT | Mod: PO

## 2019-08-12 PROCEDURE — 83036 HEMOGLOBIN GLYCOSYLATED A1C: CPT

## 2019-08-12 PROCEDURE — 3008F BODY MASS INDEX DOCD: CPT | Mod: CPTII,S$GLB,, | Performed by: FAMILY MEDICINE

## 2019-08-12 NOTE — PROGRESS NOTES
CHIEF COMPLAINT:  This is a 38-year-old male here for follow-up type 1 diabetes.    SUBJECTIVE:  The patient has type 1 diabetes and takes basal/bolus insulin daily:  LEVEMIR 30 units nightly and HUMALOG 30-35 units with each meal and as needed.  Blood sugar varies from a low of 50, 3 to 4 times a week to a high of 300 once a week.  He reports an average blood sugar of 170.  HE CHECKS HIS BLOOD SUGAR AT LEAST 7 TIMES PER DAY AND GIVES HIMSELF MULTIPLE INJECTIONS OF BOLUS INSULIN AS NEEDED.  He denies polyuria, polydipsia or polyphagia.  Last A1c was 6.6% 8 months ago.  Last eye exam December 2018.  The patient stopped taking lisinopril because he could not remember to take medication.  He recently had had flare-up of allergic rhinitis with associated persistent cough.  He has been evaluated by allergist and was told to stay off ACE-inhibitor for now.  The patient exercises regularly.    ROS:  GENERAL:  Patient denies fever, chills, night sweats.  Patient denies weight gain or loss. Patient denies anorexia, fatigue, weakness or swollen glands.  SKIN: Patient denies rash.  HEENT:  Patient denies sore throat, ear pain, hearing loss, nasal congestion, or runny nose. Patient denies visual disturbance, eye irritation or discharge.  LUNGS: Patient denies cough, wheeze or hemoptysis.  CARDIOVASCULAR: Patient denies chest pain, shortness of breath, palpitations, syncope or lower extremity edema.  GI:  Patient denies abdominal pain, nausea, vomiting, diarrhea, constipation, blood in stool or melena.  GENITOURINARY:  Patient denies dysuria, frequency, hematuria, nocturia, urgency or incontinence.  MUSCULOSKELETAL: Patient denies joint pain, swelling, redness or warmth.  NEUROLOGIC: Patient denies headache, vertigo, paresthesias, weakness in limb, dysarthria, dysphagia or abnormality of gait.  PSYCHIATRIC: Patient denies anxiety, depression, or memory loss.     OBJECTIVE:   GENERAL:  Well-developed well-nourished male alert and  oriented x3, in no acute distress.  Memory, judgment and cognition without deficit.   SKIN: Clear without rash.  Normal color and tone.  HEENT: Eyes: Clear conjunctivae.  No scleral icterus.    NECK: Supple, normal range of motion.  No masses, nodes or enlarged thyroid.  No JVD.  Carotids 2+ and equal.  No bruits.  LUNGS: Clear to auscultation.  Normal respiratory effort.  CARDIOVASCULAR: Regular rhythm, normal S1, S2 without murmur, gallop or rub.  BACK: No CVA or spinal tenderness.  ABDOMEN: Soft, nontender without mass or organomegaly.  No rebound or guarding.  EXTREMITIES: Without cyanosis, clubbing or edema.  Distal pulses 2+ and equal.  Normal range of motion in all extremities.  No joint effusion, erythema or warmth.  NEUROLOGIC:  Gait without abnormality.  No tremor.    FOOT EVALUATION: 10 gram monofilament exam with protective sensation intact bilaterally. Nails appropriately trimmed. No ulcers. Distal pulses palpable.    ASSESSMENT:  1. Controlled diabetes mellitus type 1 without complications    2. Non-seasonal allergic rhinitis, unspecified trigger      PLAN:  1.  Weight reduction. Exercise regularly.  2.  Check A1c, lipid panel and BMP.  3.  Refill bolus insulin.  4.  CGM ordered to improve diabetic control.    Addendum:  LAB REVIEWED AND ACCEPTABLE EXCEPT FOR A1C 7.5% AUGUST 12, 2019.  DIABETIC GOAL = LESS THAN 7%.    This note is generated with speech recognition software and is subject to transcription error and sound alike phrases that may be missed by proofreading.

## 2019-08-12 NOTE — TELEPHONE ENCOUNTER
----- Message from Anisha Lorenzo sent at 8/12/2019 10:49 AM CDT -----  Contact: Lena-Diabetes Management [facility] Supplies  Requesting a call back regarding patient. She states they need the patient's CNM, clincial notes faxed to [phone]. If needed please call Lena back at [phone] ext [phone]

## 2019-08-14 ENCOUNTER — TELEPHONE (OUTPATIENT)
Dept: FAMILY MEDICINE | Facility: CLINIC | Age: 39
End: 2019-08-14

## 2019-08-14 NOTE — TELEPHONE ENCOUNTER
I'm sorry. I sent his progress note in after his visit. I wasn't aware that you still had his form to be completed.  /Reuben

## 2019-08-14 NOTE — TELEPHONE ENCOUNTER
What note are they talking about?  I did not authorize that my note be sent.  I was waiting for A1c results and have not filled out the form either.

## 2019-08-14 NOTE — TELEPHONE ENCOUNTER
Cierra told me that the chart notes have no indication of hyperglycemia. It also doesn't indicate what bolus injection is currently being used. That information is required to be documented in the chart notes. They're asking if the doctor can addend that information and we send in the updated chart notes. /DENIAW    ----- Message from Yessenia Catalan sent at 8/14/2019 11:49 AM CDT -----  Contact: Raven with diabetes management and supplies  Type: Needs Medical Advice    Who Called:  Raven with diabetes management and supplies  Symptoms (please be specific):  na  How long has patient had these symptoms:  rocio  Pharmacy name and phone #:  rocio  Best Call Back Number: [phone] ext [phone]  Additional Information: calling to discuss the documents that were received on 8/13, states that there is important information missing that is needed. Fax: [phone] call to discuss, thank you!

## 2019-08-16 ENCOUNTER — TELEPHONE (OUTPATIENT)
Dept: FAMILY MEDICINE | Facility: CLINIC | Age: 39
End: 2019-08-16

## 2019-08-16 NOTE — TELEPHONE ENCOUNTER
----- Message from Margie Barros sent at 8/16/2019  9:01 AM CDT -----  Contact: Diabetes Supplies(Arturo)-[phone]  Would like to consult with nurse regarding office note, hypo and Mdi note. Please call back at [phone]. Thanks/ar

## 2019-08-19 ENCOUNTER — TELEPHONE (OUTPATIENT)
Dept: FAMILY MEDICINE | Facility: CLINIC | Age: 39
End: 2019-08-19

## 2019-08-19 NOTE — TELEPHONE ENCOUNTER
Left a message asking Kirsty to give us a call back regarding request for patient./xuanw    ----- Message from Naida Peace sent at 8/19/2019  9:54 AM CDT -----  Contact: Jan-diabetes management   Requesting a narrative in chart notes that includes injection indication.Please call [phone] ext [phone]. Please fax to [phone]. (she could not verify the pts address)      Thanks,  Naida Peace

## 2019-08-23 ENCOUNTER — TELEPHONE (OUTPATIENT)
Dept: FAMILY MEDICINE | Facility: CLINIC | Age: 39
End: 2019-08-23

## 2019-08-23 NOTE — TELEPHONE ENCOUNTER
Left a message asking them to give us a call back./xuanw  ----- Message from Leonor Nolan sent at 8/23/2019  9:16 AM CDT -----  Contact: Diabetic Mgmt/  T  Please call Chantelle @ [phone], Ext [phone] regarding pt diabetic medication Dexcom, states its being pending in notes.

## 2020-02-07 DIAGNOSIS — E11.9 TYPE 2 DIABETES MELLITUS WITHOUT COMPLICATION: ICD-10-CM

## 2020-02-20 DIAGNOSIS — E11.9 TYPE 2 DIABETES MELLITUS WITHOUT COMPLICATION: ICD-10-CM

## 2020-05-02 DIAGNOSIS — Z00.00 PREVENTATIVE HEALTH CARE: Primary | ICD-10-CM

## 2020-05-04 ENCOUNTER — LAB VISIT (OUTPATIENT)
Dept: LAB | Facility: HOSPITAL | Age: 40
End: 2020-05-04
Payer: COMMERCIAL

## 2020-05-04 DIAGNOSIS — Z00.00 PREVENTATIVE HEALTH CARE: ICD-10-CM

## 2020-05-04 DIAGNOSIS — E11.9 TYPE 2 DIABETES MELLITUS WITHOUT COMPLICATION: ICD-10-CM

## 2020-05-04 LAB
ESTIMATED AVG GLUCOSE: 131 MG/DL (ref 68–131)
HBA1C MFR BLD HPLC: 6.2 % (ref 4–5.6)

## 2020-05-04 PROCEDURE — 36415 COLL VENOUS BLD VENIPUNCTURE: CPT | Mod: PO

## 2020-05-04 PROCEDURE — 83036 HEMOGLOBIN GLYCOSYLATED A1C: CPT

## 2020-05-04 PROCEDURE — 86703 HIV-1/HIV-2 1 RESULT ANTBDY: CPT

## 2020-05-05 LAB — HIV 1+2 AB+HIV1 P24 AG SERPL QL IA: NEGATIVE

## 2020-05-11 ENCOUNTER — PATIENT OUTREACH (OUTPATIENT)
Dept: ADMINISTRATIVE | Facility: OTHER | Age: 40
End: 2020-05-11

## 2020-05-12 ENCOUNTER — OFFICE VISIT (OUTPATIENT)
Dept: OPHTHALMOLOGY | Facility: CLINIC | Age: 40
End: 2020-05-12
Payer: COMMERCIAL

## 2020-05-12 DIAGNOSIS — Z46.0 ENCOUNTER FOR FITTING OR ADJUSTMENT OF SPECTACLES OR CONTACT LENSES: ICD-10-CM

## 2020-05-12 DIAGNOSIS — E10.9 TYPE 1 DIABETES MELLITUS WITHOUT RETINOPATHY: Primary | ICD-10-CM

## 2020-05-12 DIAGNOSIS — H52.13 MYOPIA, BILATERAL: ICD-10-CM

## 2020-05-12 LAB
LEFT EYE DM RETINOPATHY: NEGATIVE
RIGHT EYE DM RETINOPATHY: NEGATIVE

## 2020-05-12 PROCEDURE — 92015 PR REFRACTION: ICD-10-PCS | Mod: S$GLB,,, | Performed by: OPTOMETRIST

## 2020-05-12 PROCEDURE — 92014 PR EYE EXAM, EST PATIENT,COMPREHESV: ICD-10-PCS | Mod: S$GLB,,, | Performed by: OPTOMETRIST

## 2020-05-12 PROCEDURE — 99999 PR PBB SHADOW E&M-EST. PATIENT-LVL I: CPT | Mod: PBBFAC,,, | Performed by: OPTOMETRIST

## 2020-05-12 PROCEDURE — 92310 CONTACT LENS FITTING OU: CPT | Mod: CSM,S$GLB,, | Performed by: OPTOMETRIST

## 2020-05-12 PROCEDURE — 92310 PR CONTACT LENS FITTING (NO CHANGE): ICD-10-PCS | Mod: CSM,S$GLB,, | Performed by: OPTOMETRIST

## 2020-05-12 PROCEDURE — 99999 PR PBB SHADOW E&M-EST. PATIENT-LVL I: ICD-10-PCS | Mod: PBBFAC,,, | Performed by: OPTOMETRIST

## 2020-05-12 PROCEDURE — 92015 DETERMINE REFRACTIVE STATE: CPT | Mod: S$GLB,,, | Performed by: OPTOMETRIST

## 2020-05-12 PROCEDURE — 92014 COMPRE OPH EXAM EST PT 1/>: CPT | Mod: S$GLB,,, | Performed by: OPTOMETRIST

## 2020-05-12 NOTE — PROGRESS NOTES
HPI     NIDDM exam.  No visual complaints.  Last eye exam 12/10/2018 [facility].  Last eye visit with [facility] 04/22/2014.  Update glasses and contact lenses RX.  Discussed fee to update contact lenses.  Lab Results       Component                Value               Date                       HGBA1C                   6.2 (H)             05/04/2020        Last edited by Robert Perkins, OD on 5/12/2020  8:13 AM. (History)            Assessment /Plan     For exam results, see Encounter Report.    Type 1 diabetes mellitus without retinopathy    Myopia, bilateral    Encounter for fitting or adjustment of spectacles or contact lenses      No Background Diabetic Retinopathy    Discussed CL charges.  Dispense Final Rx for glasses  No changes CL Rx  RTC 1 year  Discussed above and answered questions.

## 2020-07-17 ENCOUNTER — OFFICE VISIT (OUTPATIENT)
Dept: FAMILY MEDICINE | Facility: CLINIC | Age: 40
End: 2020-07-17
Payer: COMMERCIAL

## 2020-07-17 ENCOUNTER — LAB VISIT (OUTPATIENT)
Dept: LAB | Facility: HOSPITAL | Age: 40
End: 2020-07-17
Payer: COMMERCIAL

## 2020-07-17 VITALS
DIASTOLIC BLOOD PRESSURE: 70 MMHG | TEMPERATURE: 99 F | OXYGEN SATURATION: 98 % | HEART RATE: 87 BPM | SYSTOLIC BLOOD PRESSURE: 106 MMHG | BODY MASS INDEX: 25.36 KG/M2 | RESPIRATION RATE: 18 BRPM | HEIGHT: 75 IN | WEIGHT: 203.94 LBS

## 2020-07-17 DIAGNOSIS — E10.9 CONTROLLED DIABETES MELLITUS TYPE 1 WITHOUT COMPLICATIONS: ICD-10-CM

## 2020-07-17 DIAGNOSIS — J30.89 NON-SEASONAL ALLERGIC RHINITIS, UNSPECIFIED TRIGGER: ICD-10-CM

## 2020-07-17 DIAGNOSIS — Z01.818 PREOP EXAMINATION: Primary | ICD-10-CM

## 2020-07-17 DIAGNOSIS — Z01.818 PREOP EXAMINATION: ICD-10-CM

## 2020-07-17 LAB
BASOPHILS # BLD AUTO: 0.03 K/UL (ref 0–0.2)
BASOPHILS NFR BLD: 0.5 % (ref 0–1.9)
DIFFERENTIAL METHOD: NORMAL
EOSINOPHIL # BLD AUTO: 0.2 K/UL (ref 0–0.5)
EOSINOPHIL NFR BLD: 2.5 % (ref 0–8)
ERYTHROCYTE [DISTWIDTH] IN BLOOD BY AUTOMATED COUNT: 12.8 % (ref 11.5–14.5)
HCT VFR BLD AUTO: 45.4 % (ref 40–54)
HGB BLD-MCNC: 15 G/DL (ref 14–18)
IMM GRANULOCYTES # BLD AUTO: 0.02 K/UL (ref 0–0.04)
IMM GRANULOCYTES NFR BLD AUTO: 0.3 % (ref 0–0.5)
LYMPHOCYTES # BLD AUTO: 2.1 K/UL (ref 1–4.8)
LYMPHOCYTES NFR BLD: 33.6 % (ref 18–48)
MCH RBC QN AUTO: 29.2 PG (ref 27–31)
MCHC RBC AUTO-ENTMCNC: 33 G/DL (ref 32–36)
MCV RBC AUTO: 89 FL (ref 82–98)
MONOCYTES # BLD AUTO: 0.4 K/UL (ref 0.3–1)
MONOCYTES NFR BLD: 6.9 % (ref 4–15)
NEUTROPHILS # BLD AUTO: 3.6 K/UL (ref 1.8–7.7)
NEUTROPHILS NFR BLD: 56.2 % (ref 38–73)
NRBC BLD-RTO: 0 /100 WBC
PLATELET # BLD AUTO: 212 K/UL (ref 150–350)
PMV BLD AUTO: 12.9 FL (ref 9.2–12.9)
RBC # BLD AUTO: 5.13 M/UL (ref 4.6–6.2)
WBC # BLD AUTO: 6.34 K/UL (ref 3.9–12.7)

## 2020-07-17 PROCEDURE — 99214 OFFICE O/P EST MOD 30 MIN: CPT | Mod: S$GLB,,, | Performed by: FAMILY MEDICINE

## 2020-07-17 PROCEDURE — 99214 PR OFFICE/OUTPT VISIT, EST, LEVL IV, 30-39 MIN: ICD-10-PCS | Mod: S$GLB,,, | Performed by: FAMILY MEDICINE

## 2020-07-17 PROCEDURE — 85025 COMPLETE CBC W/AUTO DIFF WBC: CPT

## 2020-07-17 PROCEDURE — 99999 PR PBB SHADOW E&M-EST. PATIENT-LVL IV: ICD-10-PCS | Mod: PBBFAC,,, | Performed by: FAMILY MEDICINE

## 2020-07-17 PROCEDURE — 93005 EKG 12-LEAD: ICD-10-PCS | Mod: S$GLB,,, | Performed by: FAMILY MEDICINE

## 2020-07-17 PROCEDURE — 93010 ELECTROCARDIOGRAM REPORT: CPT | Mod: S$GLB,,, | Performed by: INTERNAL MEDICINE

## 2020-07-17 PROCEDURE — 93005 ELECTROCARDIOGRAM TRACING: CPT | Mod: S$GLB,,, | Performed by: FAMILY MEDICINE

## 2020-07-17 PROCEDURE — 99999 PR PBB SHADOW E&M-EST. PATIENT-LVL IV: CPT | Mod: PBBFAC,,, | Performed by: FAMILY MEDICINE

## 2020-07-17 PROCEDURE — 93010 EKG 12-LEAD: ICD-10-PCS | Mod: S$GLB,,, | Performed by: INTERNAL MEDICINE

## 2020-07-17 PROCEDURE — 36415 COLL VENOUS BLD VENIPUNCTURE: CPT | Mod: PO

## 2020-07-17 PROCEDURE — 80048 BASIC METABOLIC PNL TOTAL CA: CPT

## 2020-07-17 NOTE — PROGRESS NOTES
CHIEF COMPLAINT:  This is a 39-year-old male here for preoperative clearance for right shoulder arthroscopy, debridement of labrum and open biceps tenodesis per Dr. Kiel Washington due to patient's chronic medical conditions.     SUBJECTIVE: The patient has had persistent pain and decreased range of motion in right shoulder for months.  MRI showed torn labrum. The patient has had no previous problems with anesthesia.  He denies family history of perioperative complications. Patient is a type I diabetic with onset at age 16.  A1c was 6.2% 2 months ago. He uses basal and bolus insulin.  He monitors glucose continuously with CGM. He denies polyuria, polydipsia and polyphagia. Patient takes lisinopril 5 mg daily for kidney protection.  His weight is essentially unchanged with a BMI of 25.49.  He has a history of allergic rhinitis.         ROS:  GENERAL:  Patient denies fever, chills, night sweats.  Patient denies weight gain or loss. Patient denies anorexia, fatigue, weakness or swollen glands.  SKIN: Patient denies rash or hair loss.  HEENT:  Patient denies sore throat, ear pain, hearing loss, nasal congestion, or runny nose. Patient denies visual disturbance, eye irritation or discharge.  LUNGS: Patient denies cough, wheeze or hemoptysis.  CARDIOVASCULAR: Patient denies chest pain, shortness of breath, palpitations, syncope or lower extremity edema.  GI:  Patient denies abdominal pain, nausea, vomiting, diarrhea, constipation, blood in stool or melena.  GENITOURINARY:  Patient denies dysuria, frequency, hematuria, nocturia, urgency or incontinence.  MUSCULOSKELETAL: Patient denies joint pain, swelling, redness or warmth.  NEUROLOGIC: Patient denies headache, vertigo, paresthesias, weakness in limb, dysarthria, dysphagia or abnormality of gait.  PSYCHIATRIC: Patient denies anxiety, depression, or memory loss.     OBJECTIVE:   GENERAL:  Well-developed well-nourished male alert and oriented x3, in no acute distress.   Memory, judgment and cognition without deficit.   SKIN: Clear without rash.  Normal color and tone.  HEENT: Eyes: Clear conjunctivae.  No scleral icterus.  Pupils equal reactive to light and accommodation.  Ears: Clear canals. Clear TMs.  Nose: Without congestion.  Pharynx: Without injection or exudates.  NECK: Supple, normal range of motion.  No masses, nodes or enlarged thyroid.  No JVD.  Carotids 2+ and equal.  No bruits.  LUNGS: Clear to auscultation.  Normal respiratory effort.  CARDIOVASCULAR: Regular rhythm, normal S1, S2 without murmur, gallop or rub.  BACK: No CVA or spinal tenderness.  ABDOMEN: Normal appearance.  Active bowel sounds.  Soft, nontender without mass or organomegaly.  No rebound or guarding.  EXTREMITIES: Without cyanosis, clubbing or edema.  Distal pulses 2+ and equal.  Normal range of motion in all extremities.  No joint effusion, erythema or warmth.  NEUROLOGIC:   Cranial nerves II through XII without deficit.  Motor strength equal bilaterally.  Sensation normal to touch.  Deep tendon reflexes 2+ and equal.  Gait without abnormality.  No tremor.      Lab:  Acceptable except for elevated glucose.  EKG:  Normal sinus rhythm.  Possible left atrial enlargement.    ASSESSMENT:  1. Preop examination    2. Uncontrolled type 1 diabetes mellitus with hyperglycemia    3. Non-seasonal allergic rhinitis, unspecified trigger        PLAN:  1.  Avoid NSAIDs and aspirin 1 week prior to surgery.  2.  Monitor blood sugar in the perioperative period.  Treat with regular insulin on a sliding scale if necessary.  3.  Patient is cleared for surgery.    Thank you for allowing me to participate in the care of this patient.    This note is generated with speech recognition software and is subject to transcription error and sound alike phrases that may be missed by proofreading.

## 2020-07-18 LAB
ANION GAP SERPL CALC-SCNC: 8 MMOL/L (ref 8–16)
BUN SERPL-MCNC: 11 MG/DL (ref 6–20)
CALCIUM SERPL-MCNC: 8.9 MG/DL (ref 8.7–10.5)
CHLORIDE SERPL-SCNC: 103 MMOL/L (ref 95–110)
CO2 SERPL-SCNC: 29 MMOL/L (ref 23–29)
CREAT SERPL-MCNC: 1.2 MG/DL (ref 0.5–1.4)
EST. GFR  (AFRICAN AMERICAN): >60 ML/MIN/1.73 M^2
EST. GFR  (NON AFRICAN AMERICAN): >60 ML/MIN/1.73 M^2
GLUCOSE SERPL-MCNC: 260 MG/DL (ref 70–110)
POTASSIUM SERPL-SCNC: 3.9 MMOL/L (ref 3.5–5.1)
SODIUM SERPL-SCNC: 140 MMOL/L (ref 136–145)

## 2020-08-14 DIAGNOSIS — E11.9 TYPE 2 DIABETES MELLITUS WITHOUT COMPLICATION: ICD-10-CM

## 2020-08-23 DIAGNOSIS — E10.9 CONTROLLED DIABETES MELLITUS TYPE 1 WITHOUT COMPLICATIONS: Primary | ICD-10-CM

## 2020-08-23 RX ORDER — BLOOD SUGAR DIAGNOSTIC
STRIP MISCELLANEOUS
Qty: 250 EACH | Refills: 5 | Status: SHIPPED | OUTPATIENT
Start: 2020-08-23 | End: 2020-11-09 | Stop reason: SDUPTHER

## 2020-10-06 ENCOUNTER — PATIENT MESSAGE (OUTPATIENT)
Dept: ADMINISTRATIVE | Facility: HOSPITAL | Age: 40
End: 2020-10-06

## 2020-11-12 ENCOUNTER — IMMUNIZATION (OUTPATIENT)
Dept: FAMILY MEDICINE | Facility: CLINIC | Age: 40
End: 2020-11-12
Payer: COMMERCIAL

## 2020-11-12 PROCEDURE — 90686 IIV4 VACC NO PRSV 0.5 ML IM: CPT | Mod: S$GLB,,, | Performed by: FAMILY MEDICINE

## 2020-11-12 PROCEDURE — 90471 IMMUNIZATION ADMIN: CPT | Mod: S$GLB,,, | Performed by: FAMILY MEDICINE

## 2020-11-12 PROCEDURE — 90686 FLU VACCINE (QUAD) GREATER THAN OR EQUAL TO 3YO PRESERVATIVE FREE IM: ICD-10-PCS | Mod: S$GLB,,, | Performed by: FAMILY MEDICINE

## 2020-11-12 PROCEDURE — 90471 FLU VACCINE (QUAD) GREATER THAN OR EQUAL TO 3YO PRESERVATIVE FREE IM: ICD-10-PCS | Mod: S$GLB,,, | Performed by: FAMILY MEDICINE

## 2020-11-13 ENCOUNTER — PATIENT MESSAGE (OUTPATIENT)
Dept: FAMILY MEDICINE | Facility: CLINIC | Age: 40
End: 2020-11-13

## 2020-11-13 DIAGNOSIS — E11.9 TYPE 2 DIABETES MELLITUS WITHOUT COMPLICATION: ICD-10-CM

## 2020-11-13 RX ORDER — IBUPROFEN 200 MG
CAPSULE ORAL
Qty: 250 STRIP | Refills: 5 | Status: SHIPPED | OUTPATIENT
Start: 2020-11-13 | End: 2023-11-08

## 2020-11-13 RX ORDER — INSULIN PUMP SYRINGE, 3 ML
EACH MISCELLANEOUS
Qty: 1 EACH | Refills: 0 | Status: SHIPPED | OUTPATIENT
Start: 2020-11-13 | End: 2023-11-08

## 2020-11-13 RX ORDER — LANCETS
EACH MISCELLANEOUS
Qty: 250 EACH | Refills: 5 | Status: SHIPPED | OUTPATIENT
Start: 2020-11-13 | End: 2023-11-08

## 2020-11-19 ENCOUNTER — PATIENT MESSAGE (OUTPATIENT)
Dept: FAMILY MEDICINE | Facility: CLINIC | Age: 40
End: 2020-11-19

## 2020-11-20 ENCOUNTER — LAB VISIT (OUTPATIENT)
Dept: LAB | Facility: HOSPITAL | Age: 40
End: 2020-11-20
Attending: FAMILY MEDICINE
Payer: COMMERCIAL

## 2020-11-20 DIAGNOSIS — E11.9 TYPE 2 DIABETES MELLITUS WITHOUT COMPLICATION: ICD-10-CM

## 2020-11-20 PROCEDURE — 36415 COLL VENOUS BLD VENIPUNCTURE: CPT | Mod: PO

## 2020-11-20 PROCEDURE — 80061 LIPID PANEL: CPT

## 2020-11-20 PROCEDURE — 83036 HEMOGLOBIN GLYCOSYLATED A1C: CPT

## 2020-11-21 LAB
CHOLEST SERPL-MCNC: 170 MG/DL (ref 120–199)
CHOLEST/HDLC SERPL: 3.7 {RATIO} (ref 2–5)
ESTIMATED AVG GLUCOSE: 134 MG/DL (ref 68–131)
HBA1C MFR BLD HPLC: 6.3 % (ref 4–5.6)
HDLC SERPL-MCNC: 46 MG/DL (ref 40–75)
HDLC SERPL: 27.1 % (ref 20–50)
LDLC SERPL CALC-MCNC: 111.6 MG/DL (ref 63–159)
NONHDLC SERPL-MCNC: 124 MG/DL
TRIGL SERPL-MCNC: 62 MG/DL (ref 30–150)

## 2021-03-09 ENCOUNTER — PATIENT MESSAGE (OUTPATIENT)
Dept: FAMILY MEDICINE | Facility: CLINIC | Age: 41
End: 2021-03-09

## 2021-03-13 ENCOUNTER — IMMUNIZATION (OUTPATIENT)
Dept: INTERNAL MEDICINE | Facility: CLINIC | Age: 41
End: 2021-03-13
Payer: OTHER GOVERNMENT

## 2021-03-13 DIAGNOSIS — Z23 NEED FOR VACCINATION: Primary | ICD-10-CM

## 2021-03-13 PROCEDURE — 91300 COVID-19, MRNA, LNP-S, PF, 30 MCG/0.3 ML DOSE VACCINE: CPT | Mod: PBBFAC | Performed by: FAMILY MEDICINE

## 2021-04-03 ENCOUNTER — IMMUNIZATION (OUTPATIENT)
Dept: INTERNAL MEDICINE | Facility: CLINIC | Age: 41
End: 2021-04-03
Payer: OTHER GOVERNMENT

## 2021-04-03 DIAGNOSIS — Z23 NEED FOR VACCINATION: Primary | ICD-10-CM

## 2021-04-03 PROCEDURE — 0002A COVID-19, MRNA, LNP-S, PF, 30 MCG/0.3 ML DOSE VACCINE: CPT | Mod: PBBFAC | Performed by: FAMILY MEDICINE

## 2021-04-03 PROCEDURE — 91300 COVID-19, MRNA, LNP-S, PF, 30 MCG/0.3 ML DOSE VACCINE: CPT | Mod: PBBFAC | Performed by: FAMILY MEDICINE

## 2021-04-28 ENCOUNTER — PATIENT OUTREACH (OUTPATIENT)
Dept: ADMINISTRATIVE | Facility: HOSPITAL | Age: 41
End: 2021-04-28

## 2021-05-04 RX ORDER — METHYLPREDNISOLONE 4 MG/1
TABLET ORAL
Qty: 1 PACKAGE | Refills: 0 | Status: SHIPPED | OUTPATIENT
Start: 2021-05-04 | End: 2021-05-20

## 2021-05-17 ENCOUNTER — PATIENT OUTREACH (OUTPATIENT)
Dept: ADMINISTRATIVE | Facility: OTHER | Age: 41
End: 2021-05-17

## 2021-05-20 RX ORDER — PROMETHAZINE HYDROCHLORIDE AND CODEINE PHOSPHATE 6.25; 1 MG/5ML; MG/5ML
5 SOLUTION ORAL EVERY 4 HOURS PRN
Qty: 240 ML | Refills: 0 | Status: SHIPPED | OUTPATIENT
Start: 2021-05-20 | End: 2021-05-30

## 2021-06-03 DIAGNOSIS — E11.9 TYPE 2 DIABETES MELLITUS WITHOUT COMPLICATION: ICD-10-CM

## 2021-06-09 ENCOUNTER — PATIENT MESSAGE (OUTPATIENT)
Dept: OPHTHALMOLOGY | Facility: CLINIC | Age: 41
End: 2021-06-09

## 2021-06-11 ENCOUNTER — TELEPHONE (OUTPATIENT)
Dept: FAMILY MEDICINE | Facility: CLINIC | Age: 41
End: 2021-06-11

## 2021-06-11 DIAGNOSIS — E10.9 CONTROLLED DIABETES MELLITUS TYPE 1 WITHOUT COMPLICATIONS: Primary | ICD-10-CM

## 2021-06-14 ENCOUNTER — PATIENT OUTREACH (OUTPATIENT)
Dept: ADMINISTRATIVE | Facility: HOSPITAL | Age: 41
End: 2021-06-14

## 2021-06-14 DIAGNOSIS — E11.65 UNCONTROLLED TYPE 2 DIABETES MELLITUS WITH HYPERGLYCEMIA: Primary | ICD-10-CM

## 2021-06-15 ENCOUNTER — LAB VISIT (OUTPATIENT)
Dept: LAB | Facility: HOSPITAL | Age: 41
End: 2021-06-15
Attending: FAMILY MEDICINE

## 2021-06-15 DIAGNOSIS — E11.9 TYPE 2 DIABETES MELLITUS WITHOUT COMPLICATION: ICD-10-CM

## 2021-06-15 DIAGNOSIS — E11.65 UNCONTROLLED TYPE 2 DIABETES MELLITUS WITH HYPERGLYCEMIA: ICD-10-CM

## 2021-06-15 LAB
ALBUMIN/CREAT UR: NORMAL UG/MG (ref 0–30)
CREAT UR-MCNC: 81 MG/DL (ref 23–375)
ESTIMATED AVG GLUCOSE: 137 MG/DL (ref 68–131)
HBA1C MFR BLD: 6.4 % (ref 4–5.6)
MICROALBUMIN UR DL<=1MG/L-MCNC: <2.5 UG/ML

## 2021-06-15 PROCEDURE — 82043 UR ALBUMIN QUANTITATIVE: CPT | Performed by: FAMILY MEDICINE

## 2021-06-15 PROCEDURE — 83036 HEMOGLOBIN GLYCOSYLATED A1C: CPT | Performed by: FAMILY MEDICINE

## 2021-06-15 PROCEDURE — 82570 ASSAY OF URINE CREATININE: CPT | Performed by: FAMILY MEDICINE

## 2021-06-15 PROCEDURE — 36415 COLL VENOUS BLD VENIPUNCTURE: CPT | Mod: PO | Performed by: FAMILY MEDICINE

## 2021-09-14 ENCOUNTER — OFFICE VISIT (OUTPATIENT)
Dept: FAMILY MEDICINE | Facility: CLINIC | Age: 41
End: 2021-09-14
Payer: COMMERCIAL

## 2021-09-14 VITALS
WEIGHT: 200.63 LBS | HEART RATE: 86 BPM | BODY MASS INDEX: 24.94 KG/M2 | TEMPERATURE: 97 F | OXYGEN SATURATION: 96 % | SYSTOLIC BLOOD PRESSURE: 120 MMHG | RESPIRATION RATE: 18 BRPM | HEIGHT: 75 IN | DIASTOLIC BLOOD PRESSURE: 82 MMHG

## 2021-09-14 DIAGNOSIS — E10.9 CONTROLLED DIABETES MELLITUS TYPE 1 WITHOUT COMPLICATIONS: ICD-10-CM

## 2021-09-14 DIAGNOSIS — Z00.00 PREVENTATIVE HEALTH CARE: Primary | ICD-10-CM

## 2021-09-14 PROCEDURE — 1160F PR REVIEW ALL MEDS BY PRESCRIBER/CLIN PHARMACIST DOCUMENTED: ICD-10-PCS | Mod: CPTII,S$GLB,, | Performed by: FAMILY MEDICINE

## 2021-09-14 PROCEDURE — 99999 PR PBB SHADOW E&M-EST. PATIENT-LVL III: CPT | Mod: PBBFAC,,, | Performed by: FAMILY MEDICINE

## 2021-09-14 PROCEDURE — 3066F PR DOCUMENTATION OF TREATMENT FOR NEPHROPATHY: ICD-10-PCS | Mod: CPTII,S$GLB,, | Performed by: FAMILY MEDICINE

## 2021-09-14 PROCEDURE — 3061F PR NEG MICROALBUMINURIA RESULT DOCUMENTED/REVIEW: ICD-10-PCS | Mod: CPTII,S$GLB,, | Performed by: FAMILY MEDICINE

## 2021-09-14 PROCEDURE — 3079F PR MOST RECENT DIASTOLIC BLOOD PRESSURE 80-89 MM HG: ICD-10-PCS | Mod: CPTII,S$GLB,, | Performed by: FAMILY MEDICINE

## 2021-09-14 PROCEDURE — 3066F NEPHROPATHY DOC TX: CPT | Mod: CPTII,S$GLB,, | Performed by: FAMILY MEDICINE

## 2021-09-14 PROCEDURE — 4010F ACE/ARB THERAPY RXD/TAKEN: CPT | Mod: CPTII,S$GLB,, | Performed by: FAMILY MEDICINE

## 2021-09-14 PROCEDURE — 1159F PR MEDICATION LIST DOCUMENTED IN MEDICAL RECORD: ICD-10-PCS | Mod: CPTII,S$GLB,, | Performed by: FAMILY MEDICINE

## 2021-09-14 PROCEDURE — 4010F PR ACE/ARB THEARPY RXD/TAKEN: ICD-10-PCS | Mod: CPTII,S$GLB,, | Performed by: FAMILY MEDICINE

## 2021-09-14 PROCEDURE — 3008F PR BODY MASS INDEX (BMI) DOCUMENTED: ICD-10-PCS | Mod: CPTII,S$GLB,, | Performed by: FAMILY MEDICINE

## 2021-09-14 PROCEDURE — 3044F HG A1C LEVEL LT 7.0%: CPT | Mod: CPTII,S$GLB,, | Performed by: FAMILY MEDICINE

## 2021-09-14 PROCEDURE — 1159F MED LIST DOCD IN RCRD: CPT | Mod: CPTII,S$GLB,, | Performed by: FAMILY MEDICINE

## 2021-09-14 PROCEDURE — 99396 PREV VISIT EST AGE 40-64: CPT | Mod: S$GLB,,, | Performed by: FAMILY MEDICINE

## 2021-09-14 PROCEDURE — 3061F NEG MICROALBUMINURIA REV: CPT | Mod: CPTII,S$GLB,, | Performed by: FAMILY MEDICINE

## 2021-09-14 PROCEDURE — 99999 PR PBB SHADOW E&M-EST. PATIENT-LVL III: ICD-10-PCS | Mod: PBBFAC,,, | Performed by: FAMILY MEDICINE

## 2021-09-14 PROCEDURE — 99396 PR PREVENTIVE VISIT,EST,40-64: ICD-10-PCS | Mod: S$GLB,,, | Performed by: FAMILY MEDICINE

## 2021-09-14 PROCEDURE — 3044F PR MOST RECENT HEMOGLOBIN A1C LEVEL <7.0%: ICD-10-PCS | Mod: CPTII,S$GLB,, | Performed by: FAMILY MEDICINE

## 2021-09-14 PROCEDURE — 3074F PR MOST RECENT SYSTOLIC BLOOD PRESSURE < 130 MM HG: ICD-10-PCS | Mod: CPTII,S$GLB,, | Performed by: FAMILY MEDICINE

## 2021-09-14 PROCEDURE — 1160F RVW MEDS BY RX/DR IN RCRD: CPT | Mod: CPTII,S$GLB,, | Performed by: FAMILY MEDICINE

## 2021-09-14 PROCEDURE — 3008F BODY MASS INDEX DOCD: CPT | Mod: CPTII,S$GLB,, | Performed by: FAMILY MEDICINE

## 2021-09-14 PROCEDURE — 3079F DIAST BP 80-89 MM HG: CPT | Mod: CPTII,S$GLB,, | Performed by: FAMILY MEDICINE

## 2021-09-14 PROCEDURE — 3074F SYST BP LT 130 MM HG: CPT | Mod: CPTII,S$GLB,, | Performed by: FAMILY MEDICINE

## 2021-09-14 RX ORDER — ATORVASTATIN CALCIUM 20 MG/1
20 TABLET, FILM COATED ORAL DAILY
Qty: 90 TABLET | Refills: 3 | Status: SHIPPED | OUTPATIENT
Start: 2021-09-14 | End: 2022-03-31

## 2021-09-14 RX ORDER — LISINOPRIL 5 MG/1
5 TABLET ORAL DAILY
Qty: 90 TABLET | Refills: 3 | Status: SHIPPED | OUTPATIENT
Start: 2021-09-14 | End: 2022-03-31

## 2021-09-15 ENCOUNTER — LAB VISIT (OUTPATIENT)
Dept: LAB | Facility: HOSPITAL | Age: 41
End: 2021-09-15
Attending: FAMILY MEDICINE
Payer: COMMERCIAL

## 2021-09-15 DIAGNOSIS — Z00.00 PREVENTATIVE HEALTH CARE: ICD-10-CM

## 2021-09-15 DIAGNOSIS — E10.9 CONTROLLED DIABETES MELLITUS TYPE 1 WITHOUT COMPLICATIONS: ICD-10-CM

## 2021-09-15 LAB
ALBUMIN SERPL BCP-MCNC: 3.9 G/DL (ref 3.5–5.2)
ALP SERPL-CCNC: 78 U/L (ref 55–135)
ALT SERPL W/O P-5'-P-CCNC: 23 U/L (ref 10–44)
ANION GAP SERPL CALC-SCNC: 6 MMOL/L (ref 8–16)
AST SERPL-CCNC: 23 U/L (ref 10–40)
BASOPHILS # BLD AUTO: 0.03 K/UL (ref 0–0.2)
BASOPHILS NFR BLD: 0.6 % (ref 0–1.9)
BILIRUB SERPL-MCNC: 0.8 MG/DL (ref 0.1–1)
BUN SERPL-MCNC: 12 MG/DL (ref 6–20)
CALCIUM SERPL-MCNC: 9 MG/DL (ref 8.7–10.5)
CHLORIDE SERPL-SCNC: 102 MMOL/L (ref 95–110)
CHOLEST SERPL-MCNC: 162 MG/DL (ref 120–199)
CHOLEST/HDLC SERPL: 3.4 {RATIO} (ref 2–5)
CO2 SERPL-SCNC: 29 MMOL/L (ref 23–29)
CREAT SERPL-MCNC: 1 MG/DL (ref 0.5–1.4)
DIFFERENTIAL METHOD: NORMAL
EOSINOPHIL # BLD AUTO: 0.1 K/UL (ref 0–0.5)
EOSINOPHIL NFR BLD: 2.2 % (ref 0–8)
ERYTHROCYTE [DISTWIDTH] IN BLOOD BY AUTOMATED COUNT: 13.3 % (ref 11.5–14.5)
EST. GFR  (AFRICAN AMERICAN): >60 ML/MIN/1.73 M^2
EST. GFR  (NON AFRICAN AMERICAN): >60 ML/MIN/1.73 M^2
ESTIMATED AVG GLUCOSE: 134 MG/DL (ref 68–131)
GLUCOSE SERPL-MCNC: 92 MG/DL (ref 70–110)
HBA1C MFR BLD: 6.3 % (ref 4–5.6)
HCT VFR BLD AUTO: 45.1 % (ref 40–54)
HDLC SERPL-MCNC: 48 MG/DL (ref 40–75)
HDLC SERPL: 29.6 % (ref 20–50)
HGB BLD-MCNC: 14.9 G/DL (ref 14–18)
IMM GRANULOCYTES # BLD AUTO: 0.01 K/UL (ref 0–0.04)
IMM GRANULOCYTES NFR BLD AUTO: 0.2 % (ref 0–0.5)
LDLC SERPL CALC-MCNC: 103.2 MG/DL (ref 63–159)
LYMPHOCYTES # BLD AUTO: 1.7 K/UL (ref 1–4.8)
LYMPHOCYTES NFR BLD: 32.9 % (ref 18–48)
MCH RBC QN AUTO: 29.2 PG (ref 27–31)
MCHC RBC AUTO-ENTMCNC: 33 G/DL (ref 32–36)
MCV RBC AUTO: 88 FL (ref 82–98)
MONOCYTES # BLD AUTO: 0.4 K/UL (ref 0.3–1)
MONOCYTES NFR BLD: 7.7 % (ref 4–15)
NEUTROPHILS # BLD AUTO: 2.9 K/UL (ref 1.8–7.7)
NEUTROPHILS NFR BLD: 56.4 % (ref 38–73)
NONHDLC SERPL-MCNC: 114 MG/DL
NRBC BLD-RTO: 0 /100 WBC
PLATELET # BLD AUTO: 216 K/UL (ref 150–450)
PMV BLD AUTO: 11.9 FL (ref 9.2–12.9)
POTASSIUM SERPL-SCNC: 4.1 MMOL/L (ref 3.5–5.1)
PROT SERPL-MCNC: 6.8 G/DL (ref 6–8.4)
RBC # BLD AUTO: 5.11 M/UL (ref 4.6–6.2)
SODIUM SERPL-SCNC: 137 MMOL/L (ref 136–145)
TRIGL SERPL-MCNC: 54 MG/DL (ref 30–150)
TSH SERPL DL<=0.005 MIU/L-ACNC: 0.73 UIU/ML (ref 0.4–4)
WBC # BLD AUTO: 5.08 K/UL (ref 3.9–12.7)

## 2021-09-15 PROCEDURE — 80053 COMPREHEN METABOLIC PANEL: CPT | Performed by: FAMILY MEDICINE

## 2021-09-15 PROCEDURE — 83036 HEMOGLOBIN GLYCOSYLATED A1C: CPT | Performed by: FAMILY MEDICINE

## 2021-09-15 PROCEDURE — 86803 HEPATITIS C AB TEST: CPT | Performed by: FAMILY MEDICINE

## 2021-09-15 PROCEDURE — 80061 LIPID PANEL: CPT | Performed by: FAMILY MEDICINE

## 2021-09-15 PROCEDURE — 36415 COLL VENOUS BLD VENIPUNCTURE: CPT | Mod: PO | Performed by: FAMILY MEDICINE

## 2021-09-15 PROCEDURE — 84443 ASSAY THYROID STIM HORMONE: CPT | Performed by: FAMILY MEDICINE

## 2021-09-15 PROCEDURE — 85025 COMPLETE CBC W/AUTO DIFF WBC: CPT | Performed by: FAMILY MEDICINE

## 2021-09-16 LAB — HCV AB SERPL QL IA: NEGATIVE

## 2021-09-29 ENCOUNTER — PATIENT MESSAGE (OUTPATIENT)
Dept: FAMILY MEDICINE | Facility: CLINIC | Age: 41
End: 2021-09-29

## 2021-09-30 ENCOUNTER — PATIENT MESSAGE (OUTPATIENT)
Dept: FAMILY MEDICINE | Facility: CLINIC | Age: 41
End: 2021-09-30

## 2021-10-07 ENCOUNTER — OFFICE VISIT (OUTPATIENT)
Dept: ALLERGY | Facility: CLINIC | Age: 41
End: 2021-10-07
Payer: COMMERCIAL

## 2021-10-07 VITALS
TEMPERATURE: 98 F | WEIGHT: 202.63 LBS | DIASTOLIC BLOOD PRESSURE: 77 MMHG | HEART RATE: 77 BPM | SYSTOLIC BLOOD PRESSURE: 126 MMHG | BODY MASS INDEX: 25.19 KG/M2 | HEIGHT: 75 IN

## 2021-10-07 DIAGNOSIS — J30.89 ALLERGIC RHINITIS DUE TO DERMATOPHAGOIDES PTERONYSSINUS: ICD-10-CM

## 2021-10-07 DIAGNOSIS — J30.89 ALLERGIC RHINITIS DUE TO DERMATOPHAGOIDES FARINAE: ICD-10-CM

## 2021-10-07 DIAGNOSIS — J30.1 SEASONAL ALLERGIC RHINITIS DUE TO POLLEN: Primary | ICD-10-CM

## 2021-10-07 DIAGNOSIS — J30.81 ALLERGIC RHINITIS DUE TO ANIMAL (CAT) (DOG) HAIR AND DANDER: ICD-10-CM

## 2021-10-07 PROCEDURE — 3061F NEG MICROALBUMINURIA REV: CPT | Mod: CPTII,S$GLB,, | Performed by: ALLERGY & IMMUNOLOGY

## 2021-10-07 PROCEDURE — 3078F PR MOST RECENT DIASTOLIC BLOOD PRESSURE < 80 MM HG: ICD-10-PCS | Mod: CPTII,S$GLB,, | Performed by: ALLERGY & IMMUNOLOGY

## 2021-10-07 PROCEDURE — 99999 PR PBB SHADOW E&M-EST. PATIENT-LVL III: CPT | Mod: PBBFAC,,, | Performed by: ALLERGY & IMMUNOLOGY

## 2021-10-07 PROCEDURE — 3074F SYST BP LT 130 MM HG: CPT | Mod: CPTII,S$GLB,, | Performed by: ALLERGY & IMMUNOLOGY

## 2021-10-07 PROCEDURE — 99214 PR OFFICE/OUTPT VISIT, EST, LEVL IV, 30-39 MIN: ICD-10-PCS | Mod: S$GLB,,, | Performed by: ALLERGY & IMMUNOLOGY

## 2021-10-07 PROCEDURE — 4010F PR ACE/ARB THEARPY RXD/TAKEN: ICD-10-PCS | Mod: CPTII,S$GLB,, | Performed by: ALLERGY & IMMUNOLOGY

## 2021-10-07 PROCEDURE — 3044F PR MOST RECENT HEMOGLOBIN A1C LEVEL <7.0%: ICD-10-PCS | Mod: CPTII,S$GLB,, | Performed by: ALLERGY & IMMUNOLOGY

## 2021-10-07 PROCEDURE — 1159F PR MEDICATION LIST DOCUMENTED IN MEDICAL RECORD: ICD-10-PCS | Mod: CPTII,S$GLB,, | Performed by: ALLERGY & IMMUNOLOGY

## 2021-10-07 PROCEDURE — 3078F DIAST BP <80 MM HG: CPT | Mod: CPTII,S$GLB,, | Performed by: ALLERGY & IMMUNOLOGY

## 2021-10-07 PROCEDURE — 3066F PR DOCUMENTATION OF TREATMENT FOR NEPHROPATHY: ICD-10-PCS | Mod: CPTII,S$GLB,, | Performed by: ALLERGY & IMMUNOLOGY

## 2021-10-07 PROCEDURE — 3074F PR MOST RECENT SYSTOLIC BLOOD PRESSURE < 130 MM HG: ICD-10-PCS | Mod: CPTII,S$GLB,, | Performed by: ALLERGY & IMMUNOLOGY

## 2021-10-07 PROCEDURE — 3008F BODY MASS INDEX DOCD: CPT | Mod: CPTII,S$GLB,, | Performed by: ALLERGY & IMMUNOLOGY

## 2021-10-07 PROCEDURE — 3066F NEPHROPATHY DOC TX: CPT | Mod: CPTII,S$GLB,, | Performed by: ALLERGY & IMMUNOLOGY

## 2021-10-07 PROCEDURE — 3061F PR NEG MICROALBUMINURIA RESULT DOCUMENTED/REVIEW: ICD-10-PCS | Mod: CPTII,S$GLB,, | Performed by: ALLERGY & IMMUNOLOGY

## 2021-10-07 PROCEDURE — 99999 PR PBB SHADOW E&M-EST. PATIENT-LVL III: ICD-10-PCS | Mod: PBBFAC,,, | Performed by: ALLERGY & IMMUNOLOGY

## 2021-10-07 PROCEDURE — 4010F ACE/ARB THERAPY RXD/TAKEN: CPT | Mod: CPTII,S$GLB,, | Performed by: ALLERGY & IMMUNOLOGY

## 2021-10-07 PROCEDURE — 1159F MED LIST DOCD IN RCRD: CPT | Mod: CPTII,S$GLB,, | Performed by: ALLERGY & IMMUNOLOGY

## 2021-10-07 PROCEDURE — 99214 OFFICE O/P EST MOD 30 MIN: CPT | Mod: S$GLB,,, | Performed by: ALLERGY & IMMUNOLOGY

## 2021-10-07 PROCEDURE — 3008F PR BODY MASS INDEX (BMI) DOCUMENTED: ICD-10-PCS | Mod: CPTII,S$GLB,, | Performed by: ALLERGY & IMMUNOLOGY

## 2021-10-07 PROCEDURE — 3044F HG A1C LEVEL LT 7.0%: CPT | Mod: CPTII,S$GLB,, | Performed by: ALLERGY & IMMUNOLOGY

## 2021-10-07 RX ORDER — EPINEPHRINE 0.3 MG/.3ML
1 INJECTION SUBCUTANEOUS ONCE
Qty: 2 DEVICE | Refills: 3 | Status: SHIPPED | OUTPATIENT
Start: 2021-10-07 | End: 2022-03-31

## 2021-10-22 PROCEDURE — 95165 PR PROFES SVC,IMMUNOTHER,SINGLE/MULT AGS: ICD-10-PCS | Mod: 59,S$GLB,, | Performed by: ALLERGY & IMMUNOLOGY

## 2021-10-22 PROCEDURE — 95165 ANTIGEN THERAPY SERVICES: CPT | Mod: S$GLB,,, | Performed by: ALLERGY & IMMUNOLOGY

## 2021-11-02 ENCOUNTER — CLINICAL SUPPORT (OUTPATIENT)
Dept: ALLERGY | Facility: CLINIC | Age: 41
End: 2021-11-02
Payer: COMMERCIAL

## 2021-11-02 DIAGNOSIS — J30.89 ALLERGIC RHINITIS DUE TO DERMATOPHAGOIDES FARINAE: ICD-10-CM

## 2021-11-02 DIAGNOSIS — J30.81 ALLERGIC RHINITIS DUE TO CATS: ICD-10-CM

## 2021-11-02 DIAGNOSIS — J30.81 ALLERGIC RHINITIS DUE TO DOGS: ICD-10-CM

## 2021-11-02 DIAGNOSIS — J30.1 SEASONAL ALLERGIC RHINITIS DUE TO POLLEN: ICD-10-CM

## 2021-11-02 DIAGNOSIS — J30.89 ALLERGIC RHINITIS DUE TO DERMATOPHAGOIDES PTERONYSSINUS: ICD-10-CM

## 2021-11-02 PROCEDURE — 99999 PR PBB SHADOW E&M-EST. PATIENT-LVL II: CPT | Mod: PBBFAC,,,

## 2021-11-02 PROCEDURE — 95117 IMMUNOTHERAPY INJECTIONS: CPT | Mod: S$GLB,,, | Performed by: ALLERGY & IMMUNOLOGY

## 2021-11-02 PROCEDURE — 95117 PR IMMU2THERAPY, 2+ INJECTIONS: ICD-10-PCS | Mod: S$GLB,,, | Performed by: ALLERGY & IMMUNOLOGY

## 2021-11-02 PROCEDURE — 99499 NO LOS: ICD-10-PCS | Mod: S$GLB,,, | Performed by: ALLERGY & IMMUNOLOGY

## 2021-11-02 PROCEDURE — 99999 PR PBB SHADOW E&M-EST. PATIENT-LVL II: ICD-10-PCS | Mod: PBBFAC,,,

## 2021-11-02 PROCEDURE — 99499 UNLISTED E&M SERVICE: CPT | Mod: S$GLB,,, | Performed by: ALLERGY & IMMUNOLOGY

## 2021-12-30 ENCOUNTER — IMMUNIZATION (OUTPATIENT)
Dept: PRIMARY CARE CLINIC | Facility: CLINIC | Age: 41
End: 2021-12-30
Payer: COMMERCIAL

## 2021-12-30 DIAGNOSIS — Z23 NEED FOR VACCINATION: Primary | ICD-10-CM

## 2021-12-30 PROCEDURE — 0004A COVID-19, MRNA, LNP-S, PF, 30 MCG/0.3 ML DOSE VACCINE: CPT | Mod: CV19,PBBFAC | Performed by: FAMILY MEDICINE

## 2022-01-13 ENCOUNTER — TELEPHONE (OUTPATIENT)
Dept: DIABETES | Facility: CLINIC | Age: 42
End: 2022-01-13
Payer: COMMERCIAL

## 2022-01-13 NOTE — TELEPHONE ENCOUNTER
----- Message from Christina Lopez sent at 1/13/2022 10:29 AM CST -----  Regarding: New Patient  Contact: Patient  Patient is requesting a call back to schedule a new patient appt with physician for diabetes management   Patient would like to be seen as soon as possible   Please Assist     Patient can be reached at [phone]

## 2022-02-09 DIAGNOSIS — E10.9 CONTROLLED DIABETES MELLITUS TYPE 1 WITHOUT COMPLICATIONS: Primary | ICD-10-CM

## 2022-02-10 ENCOUNTER — LAB VISIT (OUTPATIENT)
Dept: LAB | Facility: HOSPITAL | Age: 42
End: 2022-02-10
Payer: COMMERCIAL

## 2022-02-10 DIAGNOSIS — E10.9 CONTROLLED DIABETES MELLITUS TYPE 1 WITHOUT COMPLICATIONS: ICD-10-CM

## 2022-02-10 LAB
ESTIMATED AVG GLUCOSE: 117 MG/DL (ref 68–131)
HBA1C MFR BLD: 5.7 % (ref 4–5.6)

## 2022-02-10 PROCEDURE — 36415 COLL VENOUS BLD VENIPUNCTURE: CPT | Mod: PO | Performed by: FAMILY MEDICINE

## 2022-02-10 PROCEDURE — 83036 HEMOGLOBIN GLYCOSYLATED A1C: CPT | Performed by: FAMILY MEDICINE

## 2022-02-22 ENCOUNTER — PATIENT MESSAGE (OUTPATIENT)
Dept: FAMILY MEDICINE | Facility: CLINIC | Age: 42
End: 2022-02-22
Payer: COMMERCIAL

## 2022-02-22 RX ORDER — SCOLOPAMINE TRANSDERMAL SYSTEM 1 MG/1
1 PATCH, EXTENDED RELEASE TRANSDERMAL
Qty: 4 PATCH | Refills: 0 | Status: SHIPPED | OUTPATIENT
Start: 2022-02-22 | End: 2023-01-01

## 2022-02-23 ENCOUNTER — PATIENT OUTREACH (OUTPATIENT)
Dept: ADMINISTRATIVE | Facility: OTHER | Age: 42
End: 2022-02-23
Payer: COMMERCIAL

## 2022-02-24 ENCOUNTER — OFFICE VISIT (OUTPATIENT)
Dept: DIABETES | Facility: CLINIC | Age: 42
End: 2022-02-24
Payer: COMMERCIAL

## 2022-02-24 VITALS
WEIGHT: 203.69 LBS | BODY MASS INDEX: 25.46 KG/M2 | DIASTOLIC BLOOD PRESSURE: 93 MMHG | HEART RATE: 82 BPM | SYSTOLIC BLOOD PRESSURE: 138 MMHG

## 2022-02-24 DIAGNOSIS — Z96.41 INSULIN PUMP IN PLACE: ICD-10-CM

## 2022-02-24 DIAGNOSIS — E10.65 TYPE 1 DIABETES MELLITUS WITH HYPERGLYCEMIA, WITH LONG-TERM CURRENT USE OF INSULIN: Primary | ICD-10-CM

## 2022-02-24 PROCEDURE — 99999 PR PBB SHADOW E&M-EST. PATIENT-LVL III: ICD-10-PCS | Mod: PBBFAC,,, | Performed by: PHYSICIAN ASSISTANT

## 2022-02-24 PROCEDURE — 3044F PR MOST RECENT HEMOGLOBIN A1C LEVEL <7.0%: ICD-10-PCS | Mod: CPTII,S$GLB,, | Performed by: PHYSICIAN ASSISTANT

## 2022-02-24 PROCEDURE — 82962 POCT GLUCOSE, HAND-HELD DEVICE: ICD-10-PCS | Mod: S$GLB,,, | Performed by: PHYSICIAN ASSISTANT

## 2022-02-24 PROCEDURE — 3075F SYST BP GE 130 - 139MM HG: CPT | Mod: CPTII,S$GLB,, | Performed by: PHYSICIAN ASSISTANT

## 2022-02-24 PROCEDURE — 95251 CONT GLUC MNTR ANALYSIS I&R: CPT | Mod: S$GLB,,, | Performed by: PHYSICIAN ASSISTANT

## 2022-02-24 PROCEDURE — 99999 PR PBB SHADOW E&M-EST. PATIENT-LVL III: CPT | Mod: PBBFAC,,, | Performed by: PHYSICIAN ASSISTANT

## 2022-02-24 PROCEDURE — 1159F MED LIST DOCD IN RCRD: CPT | Mod: CPTII,S$GLB,, | Performed by: PHYSICIAN ASSISTANT

## 2022-02-24 PROCEDURE — 3044F HG A1C LEVEL LT 7.0%: CPT | Mod: CPTII,S$GLB,, | Performed by: PHYSICIAN ASSISTANT

## 2022-02-24 PROCEDURE — 3008F PR BODY MASS INDEX (BMI) DOCUMENTED: ICD-10-PCS | Mod: CPTII,S$GLB,, | Performed by: PHYSICIAN ASSISTANT

## 2022-02-24 PROCEDURE — 1159F PR MEDICATION LIST DOCUMENTED IN MEDICAL RECORD: ICD-10-PCS | Mod: CPTII,S$GLB,, | Performed by: PHYSICIAN ASSISTANT

## 2022-02-24 PROCEDURE — 3008F BODY MASS INDEX DOCD: CPT | Mod: CPTII,S$GLB,, | Performed by: PHYSICIAN ASSISTANT

## 2022-02-24 PROCEDURE — 3080F PR MOST RECENT DIASTOLIC BLOOD PRESSURE >= 90 MM HG: ICD-10-PCS | Mod: CPTII,S$GLB,, | Performed by: PHYSICIAN ASSISTANT

## 2022-02-24 PROCEDURE — 82962 GLUCOSE BLOOD TEST: CPT | Mod: S$GLB,,, | Performed by: PHYSICIAN ASSISTANT

## 2022-02-24 PROCEDURE — 3080F DIAST BP >= 90 MM HG: CPT | Mod: CPTII,S$GLB,, | Performed by: PHYSICIAN ASSISTANT

## 2022-02-24 PROCEDURE — 99205 PR OFFICE/OUTPT VISIT, NEW, LEVL V, 60-74 MIN: ICD-10-PCS | Mod: S$GLB,,, | Performed by: PHYSICIAN ASSISTANT

## 2022-02-24 PROCEDURE — 99205 OFFICE O/P NEW HI 60 MIN: CPT | Mod: S$GLB,,, | Performed by: PHYSICIAN ASSISTANT

## 2022-02-24 PROCEDURE — 3075F PR MOST RECENT SYSTOLIC BLOOD PRESS GE 130-139MM HG: ICD-10-PCS | Mod: CPTII,S$GLB,, | Performed by: PHYSICIAN ASSISTANT

## 2022-02-24 PROCEDURE — 95251 PR GLUCOSE MONITOR, 72 HOUR, PHYS INTERP: ICD-10-PCS | Mod: S$GLB,,, | Performed by: PHYSICIAN ASSISTANT

## 2022-02-24 RX ORDER — INSULIN DETEMIR 100 [IU]/ML
16 INJECTION, SOLUTION SUBCUTANEOUS DAILY
Qty: 6 ML | Refills: 11 | Status: SHIPPED | OUTPATIENT
Start: 2022-02-24 | End: 2023-06-08 | Stop reason: SDUPTHER

## 2022-02-24 RX ORDER — GLUCAGON 3 MG/1
1 POWDER NASAL DAILY PRN
Qty: 2 EACH | Refills: 2 | Status: SHIPPED | OUTPATIENT
Start: 2022-02-24

## 2022-02-24 NOTE — PROGRESS NOTES
PCP: Lena Go MD    Subjective:     Chief Complaint: Diabetes Consult    HISTORY OF PRESENT ILLNESS: 41 y.o.   male presenting for diabetes consult.   Patient has had Type I diabetes since age 15.  Pertinent to decision making is the following comorbidities: N/A  Patient has the following Diabetes complications: without complications  He  has attended diabetes education in the past.     Patient's most recent A1c of 5.7% was completed 2 weeks ago.   Patient states since His last A1c His blood glucose levels have been both high and low throughout the day .   Patient monitors blood glucose 4 times per day and Continuously with personal CGM Dexcom.   Patient blood glucose monitoring device will be uploaded into [employer] [facility] today.  Interpretation of CGM meter includes Avg  mg/dl. Patient's time in range was 89%, time above range was 5%, and time below ranges was 5%.   Interpretation of pump report includes average TDD of 60.97 units with average basal of 14.49 units or 24%, an average food bolus of 46.48 units or 76%, an correction bolus of 0 units or 0%, and an average Control IQ bolus of 0 units or 0%. Patient is not changing cartridges at least every 3 days - averaging 3.3 days.   Patient's records show frequent hypoglycemia and postprandial hyperglycmia following pattern of frequent manual bolusing throughout the day. Not utilizing bolus wizard. Frequent suspensions of pump.   Patient endorses the following diabetes related symptoms: None.  Patient is due today for the following diabetes-related health maintenance standards: Eye Exam and Influenza Vaccine. Previously established with Dr. Perkins of [facility].   He denies recent hospital admissions or emergency room visits.   He voices having hypoglycemia.   Patient's concerns today include glycemic control.   Patient medication regimen is as below.     CURRENT DM MEDICATIONS:    CS-II Tandem Control IQ with Lispro     INSULIN  PUMP SETTINGS:  Insulin pump type: Tandem with Control IQ  Insulin Type: Humalog  Basal rate:    0.600 Units/hour from 0000 hours to 0030 hours,    0.850 Units/hour from 0030 hours to 0600 hours,    0.600 Units/hour from 0600 hours to 2400 hours  Insulin to Carbohydrate Ratio: 1/15   Insulin Sensitivity Factor: 1/30   Glucose Target: 115 mg/dl   Active Insulin Time: 5 hours    Other Pump Considerations:   Current Glucagon Kit: no  Current Ketone Strips: no  Emergency Plan for Pump Failure:    Basal: Levemir 16 units daily - 15% increase from average basal use   Bolus: Humalog IC 15, ISF 30,     Labs Reviewed.       No results found for: CPEPTIDE  No results found for: GLUTAMICACID       //   , There is no height or weight on file to calculate BMI.  His blood sugar in clinic today is:    No components found for: POCGLUC      Review of Systems   Constitutional: Negative for activity change, appetite change, chills and fever.   HENT: Negative for dental problem, mouth sores, nosebleeds, sore throat and trouble swallowing.    Eyes: Negative for pain and discharge.   Respiratory: Negative for shortness of breath, wheezing and stridor.    Cardiovascular: Negative for chest pain, palpitations and leg swelling.   Gastrointestinal: Negative for abdominal pain, diarrhea, nausea and vomiting.   Endocrine: Negative for polydipsia, polyphagia and polyuria.   Genitourinary: Negative for dysuria, frequency and urgency.   Musculoskeletal: Negative for joint swelling and myalgias.   Skin: Negative for rash and wound.   Neurological: Negative for dizziness, syncope, weakness and headaches.   Psychiatric/Behavioral: Negative for behavioral problems and dysphoric mood.         Diabetes Management Status  Statin: Taking  ACE/ARB: Taking    Screening or Prevention Patient's value Goal Complete/Controlled?   HgA1C Testing and Control   Lab Results   Component Value Date    HGBA1C 5.7 (H) 02/10/2022      Annually/Less than 8% Yes    Lipid profile : 09/15/2021 Annually Yes   LDL control Lab Results   Component Value Date    LDLCALC 103.2 09/15/2021    Annually/Less than 100 mg/dl  No   Nephropathy screening Lab Results   Component Value Date    MICALBCREAT Unable to calculate 09/15/2021     No results found for: PROTEINUA Annually Yes   Blood pressure BP Readings from Last 1 Encounters:   10/07/21 126/77    Less than 140/90 Yes   Dilated retinal exam : 05/12/2020 Annually Yes    Foot exam   : 09/14/2021 Annually Yes     Social History     Socioeconomic History    Marital status:    Tobacco Use    Smoking status: Never Smoker    Smokeless tobacco: Never Used   Substance and Sexual Activity    Alcohol use: No    Drug use: No    Sexual activity: Yes     Partners: Female   Social History Narrative    The patient is  and has 3 children.  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     Past Medical History:   Diagnosis Date    Diabetes type 1, controlled 1998    Hx of Bell's palsy     Non-seasonal allergic rhinitis     Tinea versicolor        Objective:      Physical Exam  Constitutional:       General: He is not in acute distress.     Appearance: He is well-developed. He is not diaphoretic.   HENT:      Head: Normocephalic and atraumatic.      Right Ear: External ear normal.      Left Ear: External ear normal.      Nose: Nose normal.   Eyes:      General:         Right eye: No discharge.         Left eye: No discharge.      Pupils: Pupils are equal, round, and reactive to light.   Cardiovascular:      Rate and Rhythm: Normal rate and regular rhythm.      Heart sounds: Normal heart sounds.   Pulmonary:      Effort: Pulmonary effort is normal.      Breath sounds: Normal breath sounds.   Abdominal:      Palpations: Abdomen is soft.   Musculoskeletal:         General: Normal range of motion.      Cervical back: Normal range of motion and neck supple.   Skin:     General: Skin is warm and dry.      Capillary Refill: Capillary refill takes  less than 2 seconds.   Neurological:      Mental Status: He is alert and oriented to person, place, and time.      Motor: No abnormal muscle tone.      Coordination: Coordination normal.   Psychiatric:         Behavior: Behavior normal.         Thought Content: Thought content normal.         Judgment: Judgment normal.           Assessment / Plan:     Type 1 diabetes mellitus with hyperglycemia, with long-term current use of insulin  -     POCT Glucose, Hand-Held Device  -     insulin detemir U-100 (LEVEMIR FLEXTOUCH U-100 INSULN) 100 unit/mL (3 mL) InPn pen; Inject 16 Units into the skin once daily.  Dispense: 6 mL; Refill: 11  -     glucagon (BAQSIMI) 3 mg/actuation Spry; 1 each by Nasal route daily as needed (Severe hypoglycemia).  Dispense: 2 each; Refill: 2  -     acetone, urine, test (CHEMSTRIP K) Strp; 1 strip by Misc.(Non-Drug; Combo Route) route 3 (three) times daily.  Dispense: 100 each; Refill: 11    Insulin pump in place      Additional Plan Details:    - POCT Glucose  - Encouraged continuation of lifestyle changes including regular exercise and limiting carbohydrates to 30-45 grams per meal threes times daily and 15 grams per snack with a limit of two daily.   - Encouraged continued monitoring of blood glucose with maintenance of 4 times daily and Continuously with personal CGM Dexcom.   - Current DM Medication Regimen: Continue CS-II Tandem Control IQ with Lispro. See settings below. Change BR to 0.540 from 0000 - 2400. Use bolus wizard to begin putting in correction dosing to evaluate CF. May continue to use manual bolusing for carb dosing until CF evaluated.   - Health Maintenance standards addressed today: Eye Exam - will be completed within [facility] system and scheduled today and Flu Shot - patient would like to complete outside of [facility].   - Formulation of emergency pump failure plan; Levemir Rx; ketone strips Rx; Baqsimi Rx;   - Nursing Visit: Patient is below goal range for nursing visit for  age group and will not need nursing visit at this time .   - Follow up with me in 4 weeks with A1c prior and in 2 weeks for virtual visit.     INSULIN PUMP SETTINGS:  Insulin pump type: Tandem with Control IQ  Insulin Type: Humalog  Basal rate:    0.540 Units/hour from 0000 hours to 2400 hours,   Insulin to Carbohydrate Ratio: 1/15   Insulin Sensitivity Factor: 1/30   Glucose Target: 115 mg/dl    Active Insulin Time: 5 hours    Blakeney McKnight, PA-C [facility]    A total of 60 minutes was spent in face to face time, of which over 50 % was spent in counseling patient on disease process, complications, treatment, and side effects of medications.

## 2022-02-25 ENCOUNTER — PATIENT MESSAGE (OUTPATIENT)
Dept: DIABETES | Facility: CLINIC | Age: 42
End: 2022-02-25
Payer: COMMERCIAL

## 2022-02-25 NOTE — PATIENT INSTRUCTIONS
U100 Pump Failure Plan:   We have decided to develop a plan in the event that your pump breaks or is nonfunctioning. After 4 hours without pump use, you should begin to consider putting your Pump Failure Back Up Plan into action especially if you foresee that you may not be able to use your pump for more than 20 hours. Since you are currently using short acting insulin (Humalog/Novolog/Admelog/Novolin R) in your pump, you will need access to your short acting insulin vial, syringes, and a back up long acting insulin in the event of a pump failure. I have sent a prescription for a long acting insulin to your pharmacy for use during your emergency pump failure plan. It is important that you keep both types of insulin/syringes with you so that you may have access to it at least 3 times daily if needed. This medication and syringes should be brought with you on overnight trips in the case of an emergency pump failure. This means making a plan to keeping your insulin and syringes at school, work, or other places you frequent. If needed, please reach out to my office about any letters you may need for permission to keep these items for emergency purposes. We will outline your plan for pump failure emergencies below, but please remember to contact the insulin pump company (toll free number is printed on the label on the back of the insulin pump) and our office if you have a pump failure. When the insulin pump is restarted, do not restart basal rates until at least 22 hours after the last long acting insulin injection. You can set a 0% temporary basal setting that will last until this time and use your pump to bolus for meals and correction. If you need help with these feature, please call your insulin pump company tech support line or ask them in anticipation of this action.     Dosing:   If the insulin pump is non functional and discontinued for anticipated more than 20 hours, please give daily injections of:    Long  Acting Insulin Dosing:   Levemir 16 units daily - 15% increase from average basal use  Short Acting Insulin Dosing:   Humalog per IC of 15 (or 1 unit for every 15 g of carbs) and ISF/CF of 30 (or 1 unit for every 30 pts above goal blood sugar of 120)    Ex: If you are giving an injection for a meal, your dose will be 1 unit for every 15 g of carbs and 1 unit for every 30 pts above 120. So if you eat a 50g carb meal and your blood sugar is 250 mg/dl, you would calculate as following and round the units down for your dosing.   Meal Carbs (50) divided by your Carb Ratio (15) = 3.33 so you would round down to 3 units to cover your meal.   Blood Sugar (250) - Goal Blood Sugar (120) = 130 and then 130 divided by your Correction Factor (30) = 4.33 so you would round down to 4 units to correct your blood sugar.   Adding these two numbers together would give you 7 units total to take before your meal.     NOTE: This plan is based off of your average insulin needs per recent pump report. Short Acting Insulin should be taken 15 mins before eating a meal and every 2 hours as needed for correction dosing. The dose calculation above may be used for correction dosing by either only calculating the units to cover blood sugar or inputting a carb amount of 0g if not eating. Please not that taking insulin at the 2 hour krunal may mean you still have insulin on board and may lead to hypoglycemia or low blood sugars due to insulin stacking. If calculating correction dosing for the 3rd time or more time, please consider dividing in half to avoid over correction of blood sugar leading to hypoglycemia. Please call office to report to  any large sugar abnormalities including hyperglycemia > 250 and hypoglycemia < 60. Please continue to wear CGM even if not using pump to monitor for blood sugar fluctuations. Please make sure glucose tablets or sugary beverages available in case of hypoglycemia. Please make sure ketone strips are available to  you (may buy over the counter from pharmacy) and monitor urine ketones every 12 hours; if you have two readings og BG > 250 mg/dl; or if signs/symptoms concerning for DKA present including abdominal pain, nausea, vomiting, confusion, trouble breathing, dry mouth, fruity breath or breath that smells like nail polish remover, and excessive thirst/urination. If your urine ketone strips shows trace to small ketones, drink plenty of water and give yourself a correction dose. If BG is less than 250 mg/dL and you do not have moderate/large ketones, you may also exercise to help lower glucose levels. If you have moderate/large ketones, please give correction dosage, drink water, and immediately contact [facility] if during normal business hours. If outside of normal business hours, please give correction dosage, drink water, and report to nearest ED for evaluation/management of DKA.     When the insulin pump is restarted, do not restart basal rates until at least 22 hours after the last long acting insulin injection. You can set a 0% temporary basal setting that will last until this time and still use your pump to bolus for meals and correction. For help with setting temporary basal setting, please call office, view your pump's online resource library, or call 24 hour technical support hotline.     For any technical insulin pump issues, please contact the insulin pump company; the toll free number is printed on the label on the back of the insulin pump.      Ketone Strips: A prescription for Ketone strips has been sent to your pharmacy. Some insurance companies cover ketone strips while other do not. If your insurance does not cover ketone strips, I still recommend that you buy some over the counter to keep for screening for Diabetic Ketoacidosis. Patients with Diabetes are at risk developing a condition called diabetic ketoacidosis (DKA). This is a condition where ketones or acid builds up in your blood and  can be LIFE THREATENING. Diabetic Ketoacidosis is usually associated with high blood sugars greater than 250 mg/dL, but can also develop in some patients at normal blood sugar levels.  If you notice abdominal pain, diarrhea, nausea, vomiting, lethargy, difficulty breathing, confusion, and unusual fatigue or sleepiness, please check your urine ketones using the ketone strips I gave you. I also recommend checking urine ketones if you are sick, having unexplained or not easily treated high blood sugars, or if you have an insulin pump failure. If your urine ketones are positive, please seek medical care immediately by going to the nearest Emergency Department.   Potential diabetic ketoacidosis (DKA) triggering factors identified in some cases included acute illness (e.g., urinary tract infection, urosepsis, gastroenteritis, influenza, or trauma), reduced caloric or fluid intake, and reduced insulin dose.       Baqsimi Glucagon Kit - An intranasal Glucagon kit called Baqsimi has been sent to your pharmacy. I am going to link some instructions for use from the website. It is important that you review these instructions so that you can teach your family members, friends, coworkers, or others who may need to administer the medication in an emergency. Remember that emergency Glucagon kit (intranasal or injectable) are to be used in an emergency situation where you as a patient have lost consciousness secondary to low blood sugar, and it is no longer safe for you to drink or eat anything to bring your blood sugar up. If you are able to eat or drink safely, you should continue to do so to treat low blood sugar and save the Baqsimi glucagon kit for emergency situations. If unable to ingest safely, someone would then administer the medication while calling 911 as you may require additional treatment. It is important to review this with your loved ones and let them know where you keep your emergency Baqsimi glucagon kit. Please  review the link below and let me know if you have any questions or concerns:   [email or web address]=[email or web address]%20content      Baqsimi Savings Card - be sure to present this to the pharmacy in order to get 2 kits for $25  [email or web address]

## 2022-03-04 ENCOUNTER — OFFICE VISIT (OUTPATIENT)
Dept: DIABETES | Facility: CLINIC | Age: 42
End: 2022-03-04
Payer: COMMERCIAL

## 2022-03-04 DIAGNOSIS — Z96.41 INSULIN PUMP IN PLACE: ICD-10-CM

## 2022-03-04 DIAGNOSIS — E10.65 TYPE 1 DIABETES MELLITUS WITH HYPERGLYCEMIA, WITH LONG-TERM CURRENT USE OF INSULIN: Primary | ICD-10-CM

## 2022-03-04 PROCEDURE — 3044F PR MOST RECENT HEMOGLOBIN A1C LEVEL <7.0%: ICD-10-PCS | Mod: CPTII,95,, | Performed by: PHYSICIAN ASSISTANT

## 2022-03-04 PROCEDURE — 99212 PR OFFICE/OUTPT VISIT, EST, LEVL II, 10-19 MIN: ICD-10-PCS | Mod: 95,,, | Performed by: PHYSICIAN ASSISTANT

## 2022-03-04 PROCEDURE — 99212 OFFICE O/P EST SF 10 MIN: CPT | Mod: 95,,, | Performed by: PHYSICIAN ASSISTANT

## 2022-03-04 PROCEDURE — 3044F HG A1C LEVEL LT 7.0%: CPT | Mod: CPTII,95,, | Performed by: PHYSICIAN ASSISTANT

## 2022-03-04 NOTE — PATIENT INSTRUCTIONS
U100 Pump Failure Plan:   We have decided to develop a plan in the event that your pump breaks or is nonfunctioning. After 4 hours without pump use, you should begin to consider putting your Pump Failure Back Up Plan into action especially if you foresee that you may not be able to use your pump for more than 20 hours. Since you are currently using short acting insulin (Humalog/Novolog/Admelog/Novolin R) in your pump, you will need access to your short acting insulin vial, syringes, and a back up long acting insulin in the event of a pump failure. I have sent a prescription for a long acting insulin to your pharmacy for use during your emergency pump failure plan. It is important that you keep both types of insulin/syringes with you so that you may have access to it at least 3 times daily if needed. This medication and syringes should be brought with you on overnight trips in the case of an emergency pump failure. This means making a plan to keeping your insulin and syringes at school, work, or other places you frequent. If needed, please reach out to my office about any letters you may need for permission to keep these items for emergency purposes. We will outline your plan for pump failure emergencies below, but please remember to contact the insulin pump company (toll free number is printed on the label on the back of the insulin pump) and our office if you have a pump failure. When the insulin pump is restarted, do not restart basal rates until at least 22 hours after the last long acting insulin injection. You can set a 0% temporary basal setting that will last until this time and use your pump to bolus for meals and correction. If you need help with these feature, please call your insulin pump company tech support line or ask them in anticipation of this action.     Dosing:   If the insulin pump is non functional and discontinued for anticipated more than 20 hours, please give daily injections of:    Long  Acting Insulin Dosing:   Levemir 15 units daily - 15% increase from average basal use  Short Acting Insulin Dosing:   Humalog per IC of 15 (or 1 unit for every 15 g of carbs) and ISF/CF of 25 (or 1 unit for every 25 pts above goal blood sugar of 120)    Ex: If you are giving an injection for a meal, your dose will be 1 unit for every 15 g of carbs and 1 unit for every 25 pts above 120. So if you eat a 50g carb meal and your blood sugar is 250 mg/dl, you would calculate as following and round the units down for your dosing.   Meal Carbs (50) divided by your Carb Ratio (15) = 3.33 so you would round down to 3 units to cover your meal.   Blood Sugar (250) - Goal Blood Sugar (120) = 130 and then 130 divided by your Correction Factor (25) = 5.2 so you would round down to 5 units to correct your blood sugar.   Adding these two numbers together would give you 8 units total to take before your meal.     NOTE: This plan is based off of your average insulin needs per recent pump report. Short Acting Insulin should be taken 15 mins before eating a meal and every 2 hours as needed for correction dosing. The dose calculation above may be used for correction dosing by either only calculating the units to cover blood sugar or inputting a carb amount of 0g if not eating. Please not that taking insulin at the 2 hour krunal may mean you still have insulin on board and may lead to hypoglycemia or low blood sugars due to insulin stacking. If calculating correction dosing for the 3rd time or more time, please consider dividing in half to avoid over correction of blood sugar leading to hypoglycemia. Please call office to report to  any large sugar abnormalities including hyperglycemia > 250 and hypoglycemia < 60. Please continue to wear CGM even if not using pump to monitor for blood sugar fluctuations. Please make sure glucose tablets or sugary beverages available in case of hypoglycemia. Please make sure ketone strips are available to  you (may buy over the counter from pharmacy) and monitor urine ketones every 12 hours; if you have two readings og BG > 250 mg/dl; or if signs/symptoms concerning for DKA present including abdominal pain, nausea, vomiting, confusion, trouble breathing, dry mouth, fruity breath or breath that smells like nail polish remover, and excessive thirst/urination. If your urine ketone strips shows trace to small ketones, drink plenty of water and give yourself a correction dose. If BG is less than 250 mg/dL and you do not have moderate/large ketones, you may also exercise to help lower glucose levels. If you have moderate/large ketones, please give correction dosage, drink water, and immediately contact [facility] if during normal business hours. If outside of normal business hours, please give correction dosage, drink water, and report to nearest ED for evaluation/management of DKA.     When the insulin pump is restarted, do not restart basal rates until at least 22 hours after the last long acting insulin injection. You can set a 0% temporary basal setting that will last until this time and still use your pump to bolus for meals and correction. For help with setting temporary basal setting, please call office, view your pump's online resource library, or call 24 hour technical support hotline.     For any technical insulin pump issues, please contact the insulin pump company; the toll free number is printed on the label on the back of the insulin pump.      Ketone Strips: A prescription for Ketone strips has been sent to your pharmacy. Some insurance companies cover ketone strips while other do not. If your insurance does not cover ketone strips, I still recommend that you buy some over the counter to keep for screening for Diabetic Ketoacidosis. Patients with Diabetes are at risk developing a condition called diabetic ketoacidosis (DKA). This is a condition where ketones or acid builds up in your blood and  can be LIFE THREATENING. Diabetic Ketoacidosis is usually associated with high blood sugars greater than 250 mg/dL, but can also develop in some patients at normal blood sugar levels.  If you notice abdominal pain, diarrhea, nausea, vomiting, lethargy, difficulty breathing, confusion, and unusual fatigue or sleepiness, please check your urine ketones using the ketone strips I gave you. I also recommend checking urine ketones if you are sick, having unexplained or not easily treated high blood sugars, or if you have an insulin pump failure. If your urine ketones are positive, please seek medical care immediately by going to the nearest Emergency Department.   Potential diabetic ketoacidosis (DKA) triggering factors identified in some cases included acute illness (e.g., urinary tract infection, urosepsis, gastroenteritis, influenza, or trauma), reduced caloric or fluid intake, and reduced insulin dose.       Baqsimi Glucagon Kit - An intranasal Glucagon kit called Baqsimi has been sent to your pharmacy. I am going to link some instructions for use from the website. It is important that you review these instructions so that you can teach your family members, friends, coworkers, or others who may need to administer the medication in an emergency. Remember that emergency Glucagon kit (intranasal or injectable) are to be used in an emergency situation where you as a patient have lost consciousness secondary to low blood sugar, and it is no longer safe for you to drink or eat anything to bring your blood sugar up. If you are able to eat or drink safely, you should continue to do so to treat low blood sugar and save the Baqsimi glucagon kit for emergency situations. If unable to ingest safely, someone would then administer the medication while calling 911 as you may require additional treatment. It is important to review this with your loved ones and let them know where you keep your emergency Baqsimi glucagon kit. Please  review the link below and let me know if you have any questions or concerns:   [email or web address]=[email or web address]%20content      Baqsimi Savings Card - be sure to present this to the pharmacy in order to get 2 kits for $25  [email or web address]

## 2022-03-04 NOTE — PROGRESS NOTES
PCP: Lena Go MD    Subjective:     Chief Complaint: Diabetes    Established Patient - Audio Only Telehealth Visit     The patient location is: Home  The chief complaint leading to consultation is: Diabetes follow up  Visit type: Virtual visit with audio only (telephone)  Total Time Spent with Patient: 14 minutes     The reason for the audio only service rather than synchronous audio and video virtual visit was related to technical difficulties or patient preference/necessity.     Each patient to whom I provide medical services by telemedicine is:  (1) informed of the relationship between the physician and patient and the respective role of any other health care provider with respect to management of the patient; and (2) notified that they may decline to receive medical services by telemedicine and may withdraw from such care at any time. Patient verbally consented to receive this service via voice-only telephone call.    This service was not originating from a related E/M service provided within the previous 7 days nor will  to an E/M service or procedure within the next 24 hours or my soonest available appointment.  Prevailing standard of care was able to be met in this audio-only visit.       HISTORY OF PRESENT ILLNESS: 41 y.o.   male presenting for diabetes visit.   The patient's last visit with me was on 2/24/2022.  Patient has had Type I diabetes since age 15.  Pertinent to decision making is the following comorbidities: N/A  Patient has the following Diabetes complications: without complications  He  has attended diabetes education in the past.     Patient's most recent A1c of 5.7% was completed 3 weeks ago.   Patient states since His last A1c His blood glucose levels have been both high and low throughout the day .   Patient monitors blood glucose 4 times per day and Continuously with personal CGM Dexcom.   Patient blood glucose monitoring device will be uploaded into [employer] [facility]  today.  Interpretation of CGM meter includes Avg  mg/dl. Patient's time in range was 93%, time above range was 4%, and time below ranges was 3%.   Interpretation of pump report includes average TDD of 50.02 units with average basal of 13.27 units or 27%, an average food bolus of 33.50 units or 67%, an correction bolus of 1.74 units or 3%, and an average Control IQ bolus of 1.51 units or 3%. Patient is not changing cartridges at least every 3 days - averaging 4 days.   Patient's records show improving baseline glycemia with mild hyperglycemia from 3a - 6a and frequent manual bolusing for carb bolusing. Now utilizing bolus wizard for correction dosing but undercorrecting.   Patient endorses the following diabetes related symptoms: None.  Patient is due today for the following diabetes-related health maintenance standards: Eye Exam and Influenza Vaccine. Previously established with Dr. Perkins of [facility].   He denies recent hospital admissions or emergency room visits.   He voices having hypoglycemia but overall imprving.   Patient's concerns today include glycemic control.   Patient medication regimen is as below.     CURRENT DM MEDICATIONS:    CS-II Tandem Control IQ with Lispro     INSULIN PUMP SETTINGS:  Insulin pump type: Tandem with Control IQ  Insulin Type: Humalog  Basal rate:    0.540 Units/hour from 0000 hours to 0030 hours,    0.540 Units/hour from 0030 hours to 0600 hours,    0.540 Units/hour from 0600 hours to 2400 hours  Insulin to Carbohydrate Ratio: 1/15   Insulin Sensitivity Factor: 1/30   Glucose Target: 115 mg/dl   Active Insulin Time: 5 hours    Other Pump Considerations:   Current Glucagon Kit: no  Current Ketone Strips: no  Emergency Plan for Pump Failure:    Basal: Levemir 16 units daily - 15% increase from average basal use   Bolus: Humalog IC 15, ISF 30,     Labs Reviewed.       No results found for: CPEPTIDE  No results found for: GLUTAMICACID       //   , There  is no height or weight on file to calculate BMI.  His blood sugar in clinic today is:    No components found for: POCGLUC      Review of Systems   Constitutional: Negative for activity change, appetite change, chills and fever.   HENT: Negative for dental problem, mouth sores, nosebleeds, sore throat and trouble swallowing.    Eyes: Negative for pain and discharge.   Respiratory: Negative for shortness of breath, wheezing and stridor.    Cardiovascular: Negative for chest pain, palpitations and leg swelling.   Gastrointestinal: Negative for abdominal pain, diarrhea, nausea and vomiting.   Endocrine: Negative for polydipsia, polyphagia and polyuria.   Genitourinary: Negative for dysuria, frequency and urgency.   Musculoskeletal: Negative for joint swelling and myalgias.   Skin: Negative for rash and wound.   Neurological: Negative for dizziness, syncope, weakness and headaches.   Psychiatric/Behavioral: Negative for behavioral problems and dysphoric mood.         Diabetes Management Status  Statin: Taking  ACE/ARB: Taking    Screening or Prevention Patient's value Goal Complete/Controlled?   HgA1C Testing and Control   Lab Results   Component Value Date    HGBA1C 5.7 (H) 02/10/2022      Annually/Less than 8% Yes   Lipid profile : 09/15/2021 Annually Yes   LDL control Lab Results   Component Value Date    LDLCALC 103.2 09/15/2021    Annually/Less than 100 mg/dl  No   Nephropathy screening Lab Results   Component Value Date    MICALBCREAT Unable to calculate 09/15/2021     No results found for: PROTEINUA Annually Yes   Blood pressure BP Readings from Last 1 Encounters:   02/24/22 (!) 138/93    Less than 140/90 Yes   Dilated retinal exam : 05/12/2020 Annually Yes    Foot exam   : 09/14/2021 Annually Yes     Social History     Socioeconomic History    Marital status:    Tobacco Use    Smoking status: Never Smoker    Smokeless tobacco: Never Used   Substance and Sexual Activity    Alcohol use: No    Drug use:  No    Sexual activity: Yes     Partners: Female   Social History Narrative    The patient is  and has 3 children.  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     Past Medical History:   Diagnosis Date    Diabetes type 1, controlled 1998    Hx of Bell's palsy     Non-seasonal allergic rhinitis     Tinea versicolor        Objective:      Physical Exam  Neurological:      Mental Status: He is alert and oriented to person, place, and time. Mental status is at baseline.   Psychiatric:         Mood and Affect: Mood normal.         Behavior: Behavior normal.         Thought Content: Thought content normal.         Judgment: Judgment normal.           Assessment / Plan:     Type 1 diabetes mellitus with hyperglycemia, with long-term current use of insulin    Insulin pump in place      Additional Plan Details:    - POCT Glucose  - Encouraged continuation of lifestyle changes including regular exercise and limiting carbohydrates to 30-45 grams per meal threes times daily and 15 grams per snack with a limit of two daily.   - Encouraged continued monitoring of blood glucose with maintenance of 4 times daily and Continuously with personal CGM Dexcom.   - Current DM Medication Regimen: Continue CS-II Tandem Control IQ with Lispro. See settings below. Change BR to 0.600 from 0300 - 0600. Change ISF to 25. Continue to use bolus wizard to begin putting in correction dosing to evaluate CF. May continue to use manual bolusing for carb dosing until CF evaluated.   - Health Maintenance standards addressed today: Eye Exam - will be completed within [facility] and scheduled today and Flu Shot - patient would like to complete outside of [facility].   - Formulation of emergency pump failure plan;   - Nursing Visit: Patient is below goal range for nursing visit for age group and will not need nursing visit at this time .   - Follow up with me in 4 weeks with A1c prior.    INSULIN PUMP SETTINGS:  Insulin pump type: Tandem with Control  IQ  Insulin Type: Humalog  Basal rate:    0.540 Units/hour from 0000 hours to 0030 hours,    0.600 Units/hour from 0300 hours to 0600 hours,    0.540 Units/hour from 0600 hours to 2400 hours  Insulin to Carbohydrate Ratio: 1/15   Insulin Sensitivity Factor: 1/25   Glucose Target: 115 mg/dl   Active Insulin Time: 5 hours  Blakeney McKnight, PA-C [facility]

## 2022-03-21 ENCOUNTER — PATIENT MESSAGE (OUTPATIENT)
Dept: DIABETES | Facility: CLINIC | Age: 42
End: 2022-03-21
Payer: COMMERCIAL

## 2022-03-21 DIAGNOSIS — E10.65 TYPE 1 DIABETES MELLITUS WITH HYPERGLYCEMIA, WITH LONG-TERM CURRENT USE OF INSULIN: ICD-10-CM

## 2022-03-21 DIAGNOSIS — E10.65 TYPE 1 DIABETES MELLITUS WITH HYPERGLYCEMIA, WITH LONG-TERM CURRENT USE OF INSULIN: Primary | ICD-10-CM

## 2022-03-23 RX ORDER — INSULIN LISPRO 100 [IU]/ML
INJECTION, SOLUTION INTRAVENOUS; SUBCUTANEOUS
Qty: 30 ML | Refills: 11 | Status: SHIPPED | OUTPATIENT
Start: 2022-03-23 | End: 2022-03-24 | Stop reason: SDUPTHER

## 2022-03-24 ENCOUNTER — PATIENT MESSAGE (OUTPATIENT)
Dept: DIABETES | Facility: CLINIC | Age: 42
End: 2022-03-24
Payer: COMMERCIAL

## 2022-03-24 RX ORDER — INSULIN LISPRO 100 [IU]/ML
INJECTION, SOLUTION INTRAVENOUS; SUBCUTANEOUS
Qty: 30 ML | Refills: 11 | Status: SHIPPED | OUTPATIENT
Start: 2022-03-24 | End: 2023-03-27

## 2022-03-24 NOTE — TELEPHONE ENCOUNTER
Called in Humalog vial. Please call pharmacy and make sure this is covered.     Shell Valiente PA-C  Diabetes Management

## 2022-03-30 ENCOUNTER — PATIENT OUTREACH (OUTPATIENT)
Dept: ADMINISTRATIVE | Facility: OTHER | Age: 42
End: 2022-03-30
Payer: COMMERCIAL

## 2022-03-31 ENCOUNTER — OFFICE VISIT (OUTPATIENT)
Dept: DIABETES | Facility: CLINIC | Age: 42
End: 2022-03-31
Payer: COMMERCIAL

## 2022-03-31 VITALS
BODY MASS INDEX: 25.08 KG/M2 | DIASTOLIC BLOOD PRESSURE: 86 MMHG | WEIGHT: 200.63 LBS | SYSTOLIC BLOOD PRESSURE: 124 MMHG | HEART RATE: 67 BPM

## 2022-03-31 DIAGNOSIS — Z96.41 INSULIN PUMP IN PLACE: ICD-10-CM

## 2022-03-31 DIAGNOSIS — E10.65 TYPE 1 DIABETES MELLITUS WITH HYPERGLYCEMIA, WITH LONG-TERM CURRENT USE OF INSULIN: Primary | ICD-10-CM

## 2022-03-31 LAB — GLUCOSE SERPL-MCNC: 149 MG/DL (ref 70–110)

## 2022-03-31 PROCEDURE — 3008F PR BODY MASS INDEX (BMI) DOCUMENTED: ICD-10-PCS | Mod: CPTII,S$GLB,, | Performed by: PHYSICIAN ASSISTANT

## 2022-03-31 PROCEDURE — 3008F BODY MASS INDEX DOCD: CPT | Mod: CPTII,S$GLB,, | Performed by: PHYSICIAN ASSISTANT

## 2022-03-31 PROCEDURE — 99215 OFFICE O/P EST HI 40 MIN: CPT | Mod: S$GLB,,, | Performed by: PHYSICIAN ASSISTANT

## 2022-03-31 PROCEDURE — 3044F HG A1C LEVEL LT 7.0%: CPT | Mod: CPTII,S$GLB,, | Performed by: PHYSICIAN ASSISTANT

## 2022-03-31 PROCEDURE — 3044F PR MOST RECENT HEMOGLOBIN A1C LEVEL <7.0%: ICD-10-PCS | Mod: CPTII,S$GLB,, | Performed by: PHYSICIAN ASSISTANT

## 2022-03-31 PROCEDURE — 3079F DIAST BP 80-89 MM HG: CPT | Mod: CPTII,S$GLB,, | Performed by: PHYSICIAN ASSISTANT

## 2022-03-31 PROCEDURE — 99215 PR OFFICE/OUTPT VISIT, EST, LEVL V, 40-54 MIN: ICD-10-PCS | Mod: S$GLB,,, | Performed by: PHYSICIAN ASSISTANT

## 2022-03-31 PROCEDURE — 99999 PR PBB SHADOW E&M-EST. PATIENT-LVL III: CPT | Mod: PBBFAC,,, | Performed by: PHYSICIAN ASSISTANT

## 2022-03-31 PROCEDURE — 1159F MED LIST DOCD IN RCRD: CPT | Mod: CPTII,S$GLB,, | Performed by: PHYSICIAN ASSISTANT

## 2022-03-31 PROCEDURE — 3074F SYST BP LT 130 MM HG: CPT | Mod: CPTII,S$GLB,, | Performed by: PHYSICIAN ASSISTANT

## 2022-03-31 PROCEDURE — 3079F PR MOST RECENT DIASTOLIC BLOOD PRESSURE 80-89 MM HG: ICD-10-PCS | Mod: CPTII,S$GLB,, | Performed by: PHYSICIAN ASSISTANT

## 2022-03-31 PROCEDURE — 99999 PR PBB SHADOW E&M-EST. PATIENT-LVL III: ICD-10-PCS | Mod: PBBFAC,,, | Performed by: PHYSICIAN ASSISTANT

## 2022-03-31 PROCEDURE — 1159F PR MEDICATION LIST DOCUMENTED IN MEDICAL RECORD: ICD-10-PCS | Mod: CPTII,S$GLB,, | Performed by: PHYSICIAN ASSISTANT

## 2022-03-31 PROCEDURE — 3074F PR MOST RECENT SYSTOLIC BLOOD PRESSURE < 130 MM HG: ICD-10-PCS | Mod: CPTII,S$GLB,, | Performed by: PHYSICIAN ASSISTANT

## 2022-03-31 NOTE — PROGRESS NOTES
PCP: Lena Go MD    Subjective:     Chief Complaint: Diabetes    HISTORY OF PRESENT ILLNESS: 41 y.o.   male presenting for diabetes visit.   The patient's last visit with me was on 3/4/2022.  Patient has had Type I diabetes since age 15.  Pertinent to decision making is the following comorbidities: N/A  Patient has the following Diabetes complications: without complications  He  has attended diabetes education in the past.     Patient's most recent A1c of 5.7% was completed 1 months ago.   Patient states since His last A1c His blood glucose levels have been both high and low throughout the day .   Patient monitors blood glucose 4 times per day and Continuously with personal CGM Dexcom.   Patient blood glucose monitoring device will be uploaded into [employer] [facility] today.  Interpretation of CGM meter includes Avg  mg/dl. Patient's time in range was 90%, time above range was 9%, and time below ranges was 1%.   Interpretation of pump report includes average TDD of 56.92 units with average basal of 11.14 units or 20%, an average food bolus of 38.55 units or 68%, an correction bolus of 3.01 units or 5%, and an average Control IQ bolus of 4.22 units or 7%. Patient is not changing cartridges at least every 3 days - averaging 4 days.   Patient's records show hyperglycemia and need for more correction dosing from 3a - 6a and hypoglycemia in the evening due to basal rate at 4p - 8p. Some appropriate correction dosing. Still using manual dosing for IC.   Patient endorses the following diabetes related symptoms: None.  Patient is due today for the following diabetes-related health maintenance standards: Eye Exam and Influenza Vaccine. Previously established with Dr. Perkins of [facility].   He denies recent hospital admissions or emergency room visits.   He voices having hypoglycemia but overall imprving.   Patient's concerns today include glycemic control.   Patient medication regimen is as  below.     CURRENT DM MEDICATIONS:    CS-II Tandem Control IQ with Lispro     INSULIN PUMP SETTINGS:  Insulin pump type: Tandem with Control IQ  Insulin Type: Humalog  Basal rate:    0.600 Units/hour from 0000 hours to 0600 hours,    0.540 Units/hour from 0600 hours to 2200 hours,    0.600 Units/hour from 2200 hours to 2400 hours  Insulin to Carbohydrate Ratio: 1/15   Insulin Sensitivity Factor: 1/25   Glucose Target: 115 mg/dl   Active Insulin Time: 5 hours    Other Pump Considerations:   Current Glucagon Kit: no  Current Ketone Strips: no  Emergency Plan for Pump Failure:    Basal: Levemir 12 units daily - 15% increase from average basal use    Bolus: Humalog IC 15, ISF 25,      Labs Reviewed.       No results found for: CPEPTIDE  No results found for: GLUTAMICACID       //   , There is no height or weight on file to calculate BMI.  His blood sugar in clinic today is:    No components found for: POCGLUC      Review of Systems   Constitutional: Negative for activity change, appetite change, chills and fever.   HENT: Negative for dental problem, mouth sores, nosebleeds, sore throat and trouble swallowing.    Eyes: Negative for pain and discharge.   Respiratory: Negative for shortness of breath, wheezing and stridor.    Cardiovascular: Negative for chest pain, palpitations and leg swelling.   Gastrointestinal: Negative for abdominal pain, diarrhea, nausea and vomiting.   Endocrine: Negative for polydipsia, polyphagia and polyuria.   Genitourinary: Negative for dysuria, frequency and urgency.   Musculoskeletal: Negative for joint swelling and myalgias.   Skin: Negative for rash and wound.   Neurological: Negative for dizziness, syncope, weakness and headaches.   Psychiatric/Behavioral: Negative for behavioral problems and dysphoric mood.         Diabetes Management Status  Statin: Taking  ACE/ARB: Taking    Screening or Prevention Patient's value Goal Complete/Controlled?   HgA1C Testing and Control   Lab  Results   Component Value Date    HGBA1C 5.7 (H) 02/10/2022      Annually/Less than 8% Yes   Lipid profile : 09/15/2021 Annually Yes   LDL control Lab Results   Component Value Date    LDLCALC 103.2 09/15/2021    Annually/Less than 100 mg/dl  No   Nephropathy screening Lab Results   Component Value Date    MICALBCREAT Unable to calculate 09/15/2021     No results found for: PROTEINUA Annually Yes   Blood pressure BP Readings from Last 1 Encounters:   02/24/22 (!) 138/93    Less than 140/90 Yes   Dilated retinal exam : 05/12/2020 Annually Yes    Foot exam   : 09/14/2021 Annually Yes     Social History     Socioeconomic History    Marital status:    Tobacco Use    Smoking status: Never Smoker    Smokeless tobacco: Never Used   Substance and Sexual Activity    Alcohol use: No    Drug use: No    Sexual activity: Yes     Partners: Female   Social History Narrative    The patient is  and has 3 children.  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     Past Medical History:   Diagnosis Date    Diabetes type 1, controlled 1998    Hx of Bell's palsy     Non-seasonal allergic rhinitis     Tinea versicolor        Objective:      Physical Exam  Constitutional:       General: He is not in acute distress.     Appearance: He is well-developed. He is not diaphoretic.   HENT:      Head: Normocephalic and atraumatic.      Right Ear: External ear normal.      Left Ear: External ear normal.      Nose: Nose normal.   Eyes:      General:         Right eye: No discharge.         Left eye: No discharge.      Pupils: Pupils are equal, round, and reactive to light.   Cardiovascular:      Rate and Rhythm: Normal rate and regular rhythm.      Heart sounds: Normal heart sounds.   Pulmonary:      Effort: Pulmonary effort is normal.      Breath sounds: Normal breath sounds.   Abdominal:      Palpations: Abdomen is soft.   Musculoskeletal:         General: Normal range of motion.      Cervical back: Normal range of motion and neck  supple.   Skin:     General: Skin is warm and dry.      Capillary Refill: Capillary refill takes less than 2 seconds.   Neurological:      Mental Status: He is alert and oriented to person, place, and time. Mental status is at baseline.      Motor: No abnormal muscle tone.      Coordination: Coordination normal.   Psychiatric:         Mood and Affect: Mood normal.         Behavior: Behavior normal.         Thought Content: Thought content normal.         Judgment: Judgment normal.           Assessment / Plan:     Type 1 diabetes mellitus with hyperglycemia, with long-term current use of insulin    Insulin pump in place      Additional Plan Details:    - POCT Glucose  - Encouraged continuation of lifestyle changes including regular exercise and limiting carbohydrates to 30-45 grams per meal threes times daily and 15 grams per snack with a limit of two daily.   - Encouraged continued monitoring of blood glucose with maintenance of 4 times daily and Continuously with personal CGM Dexcom.   - Current DM Medication Regimen: Continue CS-II Tandem Control IQ with Lispro. See settings below. Add BR 0300 - 0600 to 0.625. Add BR 1600 - 2000 to 0.525. Continue to use bolus wizard - will add IC dosing with protein bar to assess efficacy.   - Health Maintenance standards addressed today: Eye Exam - will be completed within [facility] and scheduled today and Flu Shot - patient would like to complete outside of [facility].   - Updated emergency pump failure plan;   - Nursing Visit: Patient is below goal range for nursing visit for age group and will not need nursing visit at this time .   - Follow up with me in 2 weeks with A1c prior and 3 mo in person.    INSULIN PUMP SETTINGS:  Insulin pump type: Tandem with Control IQ  Insulin Type: Humalog  Basal rate:    0.600 Units/hour from 0000 hours to 0600 hours,    0.625 Units/hour from 0300 hours to 0600 hours,   0.540 Units/hour from 0600 hours to 1600 hours,    0.525 Units/hour from  1600 hours to 2000 hours,    0.600 Units/hour from 2000 hours to 2400 hours  Insulin to Carbohydrate Ratio: 1/15   Insulin Sensitivity Factor: 1/25   Glucose Target: 115 mg/dl   Active Insulin Time: 5 hours    Blakeney McKnight, PA-C [facility]    A total of 40 minutes was spent in face to face time, of which over 50 % was spent in counseling patient on disease process, complications, treatment, and side effects of medications.

## 2022-03-31 NOTE — PATIENT INSTRUCTIONS
U100 Pump Failure Plan:   We have decided to develop a plan in the event that your pump breaks or is nonfunctioning. After 4 hours without pump use, you should begin to consider putting your Pump Failure Back Up Plan into action especially if you foresee that you may not be able to use your pump for more than 20 hours. Since you are currently using short acting insulin (Humalog/Novolog/Admelog/Novolin R) in your pump, you will need access to your short acting insulin vial, syringes, and a back up long acting insulin in the event of a pump failure. I have sent a prescription for a long acting insulin to your pharmacy for use during your emergency pump failure plan. It is important that you keep both types of insulin/syringes with you so that you may have access to it at least 3 times daily if needed. This medication and syringes should be brought with you on overnight trips in the case of an emergency pump failure. This means making a plan to keeping your insulin and syringes at school, work, or other places you frequent. If needed, please reach out to my office about any letters you may need for permission to keep these items for emergency purposes. We will outline your plan for pump failure emergencies below, but please remember to contact the insulin pump company (toll free number is printed on the label on the back of the insulin pump) and our office if you have a pump failure. When the insulin pump is restarted, do not restart basal rates until at least 22 hours after the last long acting insulin injection. You can set a 0% temporary basal setting that will last until this time and use your pump to bolus for meals and correction. If you need help with these feature, please call your insulin pump company tech support line or ask them in anticipation of this action.     Dosing:   If the insulin pump is non functional and discontinued for anticipated more than 20 hours, please give daily injections of:    Long  Acting Insulin Dosing:   Levemir 12 units daily - 15% increase from average basal use  Short Acting Insulin Dosing:   Humalog per IC of 15 (or 1 unit for every 15 g of carbs) and ISF/CF of 25 (or 1 unit for every 25 pts above goal blood sugar of 120)    Ex: If you are giving an injection for a meal, your dose will be 1 unit for every 15 g of carbs and 1 unit for every 25 pts above 120. So if you eat a 50g carb meal and your blood sugar is 250 mg/dl, you would calculate as following and round the units down for your dosing.   Meal Carbs (50) divided by your Carb Ratio (15) = 3.33 so you would round down to 3 units to cover your meal.   Blood Sugar (250) - Goal Blood Sugar (120) = 130 and then 130 divided by your Correction Factor (25) = 5.2 so you would round down to 5 units to correct your blood sugar.   Adding these two numbers together would give you 8 units total to take before your meal.     NOTE: This plan is based off of your average insulin needs per recent pump report. Short Acting Insulin should be taken 15 mins before eating a meal and every 2 hours as needed for correction dosing. The dose calculation above may be used for correction dosing by either only calculating the units to cover blood sugar or inputting a carb amount of 0g if not eating. Please not that taking insulin at the 2 hour krunal may mean you still have insulin on board and may lead to hypoglycemia or low blood sugars due to insulin stacking. If calculating correction dosing for the 3rd time or more time, please consider dividing in half to avoid over correction of blood sugar leading to hypoglycemia. Please call office to report to  any large sugar abnormalities including hyperglycemia > 250 and hypoglycemia < 60. Please continue to wear CGM even if not using pump to monitor for blood sugar fluctuations. Please make sure glucose tablets or sugary beverages available in case of hypoglycemia. Please make sure ketone strips are available to  you (may buy over the counter from pharmacy) and monitor urine ketones every 12 hours; if you have two readings og BG > 250 mg/dl; or if signs/symptoms concerning for DKA present including abdominal pain, nausea, vomiting, confusion, trouble breathing, dry mouth, fruity breath or breath that smells like nail polish remover, and excessive thirst/urination. If your urine ketone strips shows trace to small ketones, drink plenty of water and give yourself a correction dose. If BG is less than 250 mg/dL and you do not have moderate/large ketones, you may also exercise to help lower glucose levels. If you have moderate/large ketones, please give correction dosage, drink water, and immediately contact [facility] if during normal business hours. If outside of normal business hours, please give correction dosage, drink water, and report to nearest ED for evaluation/management of DKA.     When the insulin pump is restarted, do not restart basal rates until at least 22 hours after the last long acting insulin injection. You can set a 0% temporary basal setting that will last until this time and still use your pump to bolus for meals and correction. For help with setting temporary basal setting, please call office, view your pump's online resource library, or call 24 hour technical support hotline.     For any technical insulin pump issues, please contact the insulin pump company; the toll free number is printed on the label on the back of the insulin pump.      Ketone Strips: A prescription for Ketone strips has been sent to your pharmacy. Some insurance companies cover ketone strips while other do not. If your insurance does not cover ketone strips, I still recommend that you buy some over the counter to keep for screening for Diabetic Ketoacidosis. Patients with Diabetes are at risk developing a condition called diabetic ketoacidosis (DKA). This is a condition where ketones or acid builds up in your blood and  can be LIFE THREATENING. Diabetic Ketoacidosis is usually associated with high blood sugars greater than 250 mg/dL, but can also develop in some patients at normal blood sugar levels.  If you notice abdominal pain, diarrhea, nausea, vomiting, lethargy, difficulty breathing, confusion, and unusual fatigue or sleepiness, please check your urine ketones using the ketone strips I gave you. I also recommend checking urine ketones if you are sick, having unexplained or not easily treated high blood sugars, or if you have an insulin pump failure. If your urine ketones are positive, please seek medical care immediately by going to the nearest Emergency Department.   Potential diabetic ketoacidosis (DKA) triggering factors identified in some cases included acute illness (e.g., urinary tract infection, urosepsis, gastroenteritis, influenza, or trauma), reduced caloric or fluid intake, and reduced insulin dose.       Baqsimi Glucagon Kit - An intranasal Glucagon kit called Baqsimi has been sent to your pharmacy. I am going to link some instructions for use from the website. It is important that you review these instructions so that you can teach your family members, friends, coworkers, or others who may need to administer the medication in an emergency. Remember that emergency Glucagon kit (intranasal or injectable) are to be used in an emergency situation where you as a patient have lost consciousness secondary to low blood sugar, and it is no longer safe for you to drink or eat anything to bring your blood sugar up. If you are able to eat or drink safely, you should continue to do so to treat low blood sugar and save the Baqsimi glucagon kit for emergency situations. If unable to ingest safely, someone would then administer the medication while calling 911 as you may require additional treatment. It is important to review this with your loved ones and let them know where you keep your emergency Baqsimi glucagon kit. Please  review the link below and let me know if you have any questions or concerns:   [email or web address]/how-to-use-baqsimi?utm_source=shared%20link&utm_medium=video&utm_campaign=shared%20content      Baqsimi Savings Card - be sure to present this to the pharmacy in order to get 2 kits for $25  [email or web address]/patient-support

## 2022-04-01 ENCOUNTER — PATIENT MESSAGE (OUTPATIENT)
Dept: DIABETES | Facility: CLINIC | Age: 42
End: 2022-04-01
Payer: COMMERCIAL

## 2022-04-19 ENCOUNTER — OFFICE VISIT (OUTPATIENT)
Dept: DIABETES | Facility: CLINIC | Age: 42
End: 2022-04-19
Payer: COMMERCIAL

## 2022-04-19 DIAGNOSIS — Z96.41 INSULIN PUMP IN PLACE: ICD-10-CM

## 2022-04-19 DIAGNOSIS — E10.65 TYPE 1 DIABETES MELLITUS WITH HYPERGLYCEMIA, WITH LONG-TERM CURRENT USE OF INSULIN: Primary | ICD-10-CM

## 2022-04-19 PROCEDURE — 3044F PR MOST RECENT HEMOGLOBIN A1C LEVEL <7.0%: ICD-10-PCS | Mod: CPTII,95,, | Performed by: PHYSICIAN ASSISTANT

## 2022-04-19 PROCEDURE — 99213 PR OFFICE/OUTPT VISIT, EST, LEVL III, 20-29 MIN: ICD-10-PCS | Mod: 95,,, | Performed by: PHYSICIAN ASSISTANT

## 2022-04-19 PROCEDURE — 3044F HG A1C LEVEL LT 7.0%: CPT | Mod: CPTII,95,, | Performed by: PHYSICIAN ASSISTANT

## 2022-04-19 PROCEDURE — 99213 OFFICE O/P EST LOW 20 MIN: CPT | Mod: 95,,, | Performed by: PHYSICIAN ASSISTANT

## 2022-04-19 NOTE — PROGRESS NOTES
PCP: Lena Go MD    Subjective:     Chief Complaint: Diabetes    Established Patient - Audio Only Telehealth Visit     The patient location is: Home  The chief complaint leading to consultation is: Diabetes follow up  Visit type: Virtual visit with audio only (telephone)  Total Time Spent with Patient: 22 minutes     The reason for the audio only service rather than synchronous audio and video virtual visit was related to technical difficulties or patient preference/necessity.     Each patient to whom I provide medical services by telemedicine is:  (1) informed of the relationship between the physician and patient and the respective role of any other health care provider with respect to management of the patient; and (2) notified that they may decline to receive medical services by telemedicine and may withdraw from such care at any time. Patient verbally consented to receive this service via voice-only telephone call.    This service was not originating from a related E/M service provided within the previous 7 days nor will  to an E/M service or procedure within the next 24 hours or my soonest available appointment.  Prevailing standard of care was able to be met in this audio-only visit.       HISTORY OF PRESENT ILLNESS: 41 y.o.   male presenting for diabetes visit.   The patient's last visit with me was on 3/31/2022.  Patient has had Type I diabetes since age 15.  Pertinent to decision making is the following comorbidities: N/A  Patient has the following Diabetes complications: without complications  He  has attended diabetes education in the past.     Patient's most recent A1c of 5.7% was completed 2 months ago.   Patient states since His last A1c His blood glucose levels have been both high and low throughout the day .   Patient monitors blood glucose 4 times per day and Continuously with personal CGM Dexcom.   Patient blood glucose monitoring device will be uploaded into [employer]  today.  Interpretation of CGM meter includes Avg  mg/dl. Patient's time in range was 91%, time above range was 6%, and time below ranges was 2%.   Interpretation of pump report includes average TDD of 58.67units with average basal of 11.18 units or 19%, an average food bolus of 40.61 units or 69%, an correction bolus of 2.36 units or 4%, and an average Control IQ bolus of 4.52 units or 8%. Patient is not changing cartridges at least every 3 days - averaging 4 days.   Patient's records show hyperglycemia following 2a, some control IQ bolusing following 11p, good coverage of carb bolusing, and some overcorrection of correction dosing.   Patient endorses the following diabetes related symptoms: None.  Patient is due today for the following diabetes-related health maintenance standards: Eye Exam and Influenza Vaccine. Previously established with Dr. Perkins of [facility].   He denies recent hospital admissions or emergency room visits.   He voices having hypoglycemia but overall imprving.   Patient's concerns today include glycemic control.   Patient medication regimen is as below.     CURRENT DM MEDICATIONS:    CS-II Tandem Control IQ with Lispro     INSULIN PUMP SETTINGS:  Insulin pump type: Tandem with Control IQ  Insulin Type: Humalog  Basal rate:    0.600 Units/hour from 0000 hours to 0600 hours,    0.625 Units/hour from 0300 hours to 0600 hours,   0.540 Units/hour from 0600 hours to 1600 hours,    0.525 Units/hour from 1600 hours to 2000 hours,    0.600 Units/hour from 2000 hours to 2400 hours  Insulin to Carbohydrate Ratio: 1/10  Insulin Sensitivity Factor: 1/25   Glucose Target: 115 mg/dl   Active Insulin Time: 5 hours    Other Pump Considerations:   Current Glucagon Kit: yes  Current Ketone Strips: yes  Emergency Plan for Pump Failure:    Basal: Levemir 12 units daily - 15% increase from average basal use    Bolus: Humalog IC 15, ISF 25,      Labs Reviewed.       No results found for:  CPEPTIDE  No results found for: GLUTAMICACID       //   , There is no height or weight on file to calculate BMI.  His blood sugar in clinic today is:    No components found for: POCGLUC      Review of Systems   Constitutional: Negative for activity change, appetite change, chills and fever.   HENT: Negative for dental problem, mouth sores, nosebleeds, sore throat and trouble swallowing.    Eyes: Negative for pain and discharge.   Respiratory: Negative for shortness of breath, wheezing and stridor.    Cardiovascular: Negative for chest pain, palpitations and leg swelling.   Gastrointestinal: Negative for abdominal pain, diarrhea, nausea and vomiting.   Endocrine: Negative for polydipsia, polyphagia and polyuria.   Genitourinary: Negative for dysuria, frequency and urgency.   Musculoskeletal: Negative for joint swelling and myalgias.   Skin: Negative for rash and wound.   Neurological: Negative for dizziness, syncope, weakness and headaches.   Psychiatric/Behavioral: Negative for behavioral problems and dysphoric mood.         Diabetes Management Status  Statin: Taking  ACE/ARB: Taking    Screening or Prevention Patient's value Goal Complete/Controlled?   HgA1C Testing and Control   Lab Results   Component Value Date    HGBA1C 5.7 (H) 02/10/2022      Annually/Less than 8% Yes   Lipid profile : 09/15/2021 Annually Yes   LDL control Lab Results   Component Value Date    LDLCALC 103.2 09/15/2021    Annually/Less than 100 mg/dl  No   Nephropathy screening Lab Results   Component Value Date    MICALBCREAT Unable to calculate 09/15/2021     No results found for: PROTEINUA Annually Yes   Blood pressure BP Readings from Last 1 Encounters:   03/31/22 124/86    Less than 140/90 Yes   Dilated retinal exam : 05/12/2020 Annually Yes    Foot exam   : 09/14/2021 Annually Yes     Social History     Socioeconomic History    Marital status:    Tobacco Use    Smoking status: Never Smoker    Smokeless tobacco: Never Used    Substance and Sexual Activity    Alcohol use: No    Drug use: No    Sexual activity: Yes     Partners: Female   Social History Narrative    The patient is  and has 3 children.  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     Past Medical History:   Diagnosis Date    Diabetes type 1, controlled 1998    Hx of Bell's palsy     Non-seasonal allergic rhinitis     Tinea versicolor        Objective:      Physical Exam  Neurological:      Mental Status: He is alert and oriented to person, place, and time. Mental status is at baseline.   Psychiatric:         Mood and Affect: Mood normal.         Behavior: Behavior normal.         Thought Content: Thought content normal.         Judgment: Judgment normal.           Assessment / Plan:     Type 1 diabetes mellitus with hyperglycemia, with long-term current use of insulin    Insulin pump in place      Additional Plan Details:    - POCT Glucose  - Encouraged continuation of lifestyle changes including regular exercise and limiting carbohydrates to 30-45 grams per meal threes times daily and 15 grams per snack with a limit of two daily.   - Encouraged continued monitoring of blood glucose with maintenance of 4 times daily and Continuously with personal CGM Dexcom.   - Current DM Medication Regimen: Continue CS-II Tandem Control IQ with Lispro. See settings below. Change BR 0300 to 0200 to 0.630. Continue to use bolus wizard. Continue to monitor 11p for basal rate need change. Monitor need for change of CF.   - Health Maintenance standards addressed today: Eye Exam - will be completed within [facility] and scheduled today and Flu Shot - patient would like to complete outside of [facility].   - Updated emergency pump failure plan;   - Nursing Visit: Patient is below goal range for nursing visit for age group and will not need nursing visit at this time .   - Follow up with me in 1 weeks with A1c prior and 2 mo in person.    INSULIN PUMP SETTINGS:  Insulin pump type: Tandem with  Control IQ  Insulin Type: Humalog  Basal rate:    0.600 Units/hour from 0000 hours to 0200 hours,    0.630 Units/hour from 0200 hours to 0600 hours,   0.540 Units/hour from 0600 hours to 1600 hours,    0.525 Units/hour from 1600 hours to 2000 hours,    0.600 Units/hour from 2000 hours to 2400 hours  Insulin to Carbohydrate Ratio: 1/10  Insulin Sensitivity Factor: 1/25   Glucose Target: 115 mg/dl   Active Insulin Time: 5 hours    Blakeney McKnight, PA-C [facility]

## 2022-04-26 ENCOUNTER — PATIENT MESSAGE (OUTPATIENT)
Dept: ADMINISTRATIVE | Facility: HOSPITAL | Age: 42
End: 2022-04-26
Payer: COMMERCIAL

## 2022-04-28 ENCOUNTER — OFFICE VISIT (OUTPATIENT)
Dept: DIABETES | Facility: CLINIC | Age: 42
End: 2022-04-28
Payer: COMMERCIAL

## 2022-04-28 DIAGNOSIS — Z96.41 INSULIN PUMP IN PLACE: ICD-10-CM

## 2022-04-28 DIAGNOSIS — E10.65 TYPE 1 DIABETES MELLITUS WITH HYPERGLYCEMIA, WITH LONG-TERM CURRENT USE OF INSULIN: Primary | ICD-10-CM

## 2022-04-28 PROCEDURE — 99212 PR OFFICE/OUTPT VISIT, EST, LEVL II, 10-19 MIN: ICD-10-PCS | Mod: 95,,, | Performed by: PHYSICIAN ASSISTANT

## 2022-04-28 PROCEDURE — 3044F HG A1C LEVEL LT 7.0%: CPT | Mod: CPTII,95,, | Performed by: PHYSICIAN ASSISTANT

## 2022-04-28 PROCEDURE — 3044F PR MOST RECENT HEMOGLOBIN A1C LEVEL <7.0%: ICD-10-PCS | Mod: CPTII,95,, | Performed by: PHYSICIAN ASSISTANT

## 2022-04-28 PROCEDURE — 99212 OFFICE O/P EST SF 10 MIN: CPT | Mod: 95,,, | Performed by: PHYSICIAN ASSISTANT

## 2022-04-28 NOTE — PROGRESS NOTES
PCP: Lena Go MD    Subjective:     Chief Complaint: Diabetes    Established Patient - Audio Only Telehealth Visit     The patient location is: Home  The chief complaint leading to consultation is: Diabetes follow up  Visit type: Virtual visit with audio only (telephone)  Total Time Spent with Patient: 18 minutes     The reason for the audio only service rather than synchronous audio and video virtual visit was related to technical difficulties or patient preference/necessity.     Each patient to whom I provide medical services by telemedicine is:  (1) informed of the relationship between the physician and patient and the respective role of any other health care provider with respect to management of the patient; and (2) notified that they may decline to receive medical services by telemedicine and may withdraw from such care at any time. Patient verbally consented to receive this service via voice-only telephone call.    This service was not originating from a related E/M service provided within the previous 7 days nor will  to an E/M service or procedure within the next 24 hours or my soonest available appointment.  Prevailing standard of care was able to be met in this audio-only visit.       HISTORY OF PRESENT ILLNESS: 41 y.o.   male presenting for diabetes visit.   The patient's last visit with me was on 4/19/2022.  Patient has had Type I diabetes since age 15.  Pertinent to decision making is the following comorbidities: N/A  Patient has the following Diabetes complications: without complications  He  has attended diabetes education in the past.     Patient's most recent A1c of 5.7% was completed 2 months ago.   Patient states since His last A1c His blood glucose levels have been both high and low throughout the day .   Patient monitors blood glucose 4 times per day and Continuously with personal CGM Dexcom.   Patient blood glucose monitoring device will be uploaded into [employer] [facility]  today.  Interpretation of CGM meter includes Avg  mg/dl. Patient's time in range was 88%, time above range was 10%, and time below ranges was 3%.   Interpretation of pump report includes average TDD of 53.89 units with average basal of 10.88 units or 20%, an average food bolus of 35.47 units or 66%, an correction bolus of 2.93 units or 5%, and an average Control IQ bolus of 4.62 units or 9%. Patient is not changing cartridges at least every 3 days - averaging 4 days.   Patient's records show some postprandial hyperglycemia throughout the day.   Patient endorses the following diabetes related symptoms: None.  Patient is due today for the following diabetes-related health maintenance standards: Eye Exam. Previously established with Dr. Perkins of [facility].   He denies recent hospital admissions or emergency room visits.   He voices having hypoglycemia but overall imprving.   Patient's concerns today include glycemic control.   Patient medication regimen is as below.     CURRENT DM MEDICATIONS:    CS-II Tandem Control IQ with Lispro     INSULIN PUMP SETTINGS:  Insulin pump type: Tandem with Control IQ  Insulin Type: Humalog  Basal rate:    0.600 Units/hour from 0000 hours to 0200 hours,    0.630 Units/hour from 0200 hours to 0600 hours,   0.540 Units/hour from 0600 hours to 1600 hours,    0.525 Units/hour from 1600 hours to 2000 hours,    0.600 Units/hour from 2000 hours to 2400 hours  Insulin to Carbohydrate Ratio: 1/10  Insulin Sensitivity Factor: 1/25   Glucose Target: 115 mg/dl   Active Insulin Time: 5 hours    Other Pump Considerations:   Current Glucagon Kit: yes  Current Ketone Strips: yes  Emergency Plan for Pump Failure:    Basal: Levemir 12 units daily - 15% increase from average basal use    Bolus: Humalog IC 9, ISF 25,      Labs Reviewed.       No results found for: CPEPTIDE  No results found for: GLUTAMICACID       //   , There is no height or weight on file to calculate  BMI.  His blood sugar in clinic today is:    No components found for: POCGLUC      Review of Systems   Constitutional: Negative for activity change, appetite change, chills and fever.   HENT: Negative for dental problem, mouth sores, nosebleeds, sore throat and trouble swallowing.    Eyes: Negative for pain and discharge.   Respiratory: Negative for shortness of breath, wheezing and stridor.    Cardiovascular: Negative for chest pain, palpitations and leg swelling.   Gastrointestinal: Negative for abdominal pain, diarrhea, nausea and vomiting.   Endocrine: Negative for polydipsia, polyphagia and polyuria.   Genitourinary: Negative for dysuria, frequency and urgency.   Musculoskeletal: Negative for joint swelling and myalgias.   Skin: Negative for rash and wound.   Neurological: Negative for dizziness, syncope, weakness and headaches.   Psychiatric/Behavioral: Negative for behavioral problems and dysphoric mood.         Diabetes Management Status  Statin: Taking  ACE/ARB: Taking    Screening or Prevention Patient's value Goal Complete/Controlled?   HgA1C Testing and Control   Lab Results   Component Value Date    HGBA1C 5.7 (H) 02/10/2022      Annually/Less than 8% Yes   Lipid profile : 09/15/2021 Annually Yes   LDL control Lab Results   Component Value Date    LDLCALC 103.2 09/15/2021    Annually/Less than 100 mg/dl  No   Nephropathy screening Lab Results   Component Value Date    MICALBCREAT Unable to calculate 09/15/2021     No results found for: PROTEINUA Annually Yes   Blood pressure BP Readings from Last 1 Encounters:   03/31/22 124/86    Less than 140/90 Yes   Dilated retinal exam : 05/12/2020 Annually Yes    Foot exam   : 09/14/2021 Annually Yes     Social History     Socioeconomic History    Marital status:    Tobacco Use    Smoking status: Never Smoker    Smokeless tobacco: Never Used   Substance and Sexual Activity    Alcohol use: No    Drug use: No    Sexual activity: Yes     Partners: Female    Social History Narrative    The patient is  and has 3 children.  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     Past Medical History:   Diagnosis Date    Diabetes type 1, controlled 1998    Hx of Bell's palsy     Non-seasonal allergic rhinitis     Tinea versicolor        Objective:      Physical Exam  Neurological:      Mental Status: He is alert and oriented to person, place, and time. Mental status is at baseline.   Psychiatric:         Mood and Affect: Mood normal.         Behavior: Behavior normal.         Thought Content: Thought content normal.         Judgment: Judgment normal.           Assessment / Plan:     Type 1 diabetes mellitus with hyperglycemia, with long-term current use of insulin  -     Ambulatory referral/consult to Ophthalmology; Future; Expected date: 05/05/2022    Insulin pump in place      Additional Plan Details:    - POCT Glucose  - Encouraged continuation of lifestyle changes including regular exercise and limiting carbohydrates to 30-45 grams per meal threes times daily and 15 grams per snack with a limit of two daily.   - Encouraged continued monitoring of blood glucose with maintenance of 4 times daily and Continuously with personal CGM Dexcom.   - Current DM Medication Regimen: Continue CS-II Tandem Control IQ with Lispro. See settings below. Change IC to 9.   - Health Maintenance standards addressed today: Eye Exam - will be completed within [employer] system and scheduled today.   - Updated emergency pump failure plan;   - Nursing Visit: Patient is below goal range for nursing visit for age group and will not need nursing visit at this time .   - Follow up with me in 1 months with A1c prior.    INSULIN PUMP SETTINGS:  Insulin pump type: Tandem with Control IQ  Insulin Type: Humalog  Basal rate:    0.600 Units/hour from 0000 hours to 0200 hours,    0.630 Units/hour from 0200 hours to 0600 hours,   0.540 Units/hour from 0600 hours to 1600 hours,    0.525 Units/hour from 1600 hours to  2000 hours,    0.600 Units/hour from 2000 hours to 2400 hours  Insulin to Carbohydrate Ratio: 1/9  Insulin Sensitivity Factor: 1/25   Glucose Target: 115 mg/dl   Active Insulin Time: 5 hours    Blakeney McKnight, PA-C [facility]

## 2022-05-06 ENCOUNTER — PATIENT MESSAGE (OUTPATIENT)
Dept: FAMILY MEDICINE | Facility: CLINIC | Age: 42
End: 2022-05-06
Payer: COMMERCIAL

## 2022-05-17 ENCOUNTER — PATIENT OUTREACH (OUTPATIENT)
Dept: ADMINISTRATIVE | Facility: OTHER | Age: 42
End: 2022-05-17
Payer: COMMERCIAL

## 2022-05-17 NOTE — PROGRESS NOTES
Health Maintenance Due   Topic Date Due    Low Dose Statin  Never done    Eye Exam  05/12/2021     Updates were requested from care everywhere.  Chart was reviewed for overdue Proactive [facility] ([facility]) topics (CRS, Breast Cancer Screening, Eye exam)  Health Maintenance has been updated.  LINKS immunization registry triggered.  Immunizations were reconciled.

## 2022-05-18 ENCOUNTER — OFFICE VISIT (OUTPATIENT)
Dept: OPHTHALMOLOGY | Facility: CLINIC | Age: 42
End: 2022-05-18
Payer: COMMERCIAL

## 2022-05-18 DIAGNOSIS — E10.9 TYPE 1 DIABETES MELLITUS WITHOUT RETINOPATHY: Primary | ICD-10-CM

## 2022-05-18 DIAGNOSIS — H52.13 MYOPIA, BILATERAL: ICD-10-CM

## 2022-05-18 DIAGNOSIS — E10.65 TYPE 1 DIABETES MELLITUS WITH HYPERGLYCEMIA, WITH LONG-TERM CURRENT USE OF INSULIN: ICD-10-CM

## 2022-05-18 DIAGNOSIS — H52.221 REGULAR ASTIGMATISM OF RIGHT EYE: ICD-10-CM

## 2022-05-18 PROCEDURE — 92014 PR EYE EXAM, EST PATIENT,COMPREHESV: ICD-10-PCS | Mod: S$GLB,,, | Performed by: OPTOMETRIST

## 2022-05-18 PROCEDURE — 3044F PR MOST RECENT HEMOGLOBIN A1C LEVEL <7.0%: ICD-10-PCS | Mod: CPTII,S$GLB,, | Performed by: OPTOMETRIST

## 2022-05-18 PROCEDURE — 1160F RVW MEDS BY RX/DR IN RCRD: CPT | Mod: CPTII,S$GLB,, | Performed by: OPTOMETRIST

## 2022-05-18 PROCEDURE — 92015 DETERMINE REFRACTIVE STATE: CPT | Mod: S$GLB,,, | Performed by: OPTOMETRIST

## 2022-05-18 PROCEDURE — 1160F PR REVIEW ALL MEDS BY PRESCRIBER/CLIN PHARMACIST DOCUMENTED: ICD-10-PCS | Mod: CPTII,S$GLB,, | Performed by: OPTOMETRIST

## 2022-05-18 PROCEDURE — 92014 COMPRE OPH EXAM EST PT 1/>: CPT | Mod: S$GLB,,, | Performed by: OPTOMETRIST

## 2022-05-18 PROCEDURE — 1159F MED LIST DOCD IN RCRD: CPT | Mod: CPTII,S$GLB,, | Performed by: OPTOMETRIST

## 2022-05-18 PROCEDURE — 1159F PR MEDICATION LIST DOCUMENTED IN MEDICAL RECORD: ICD-10-PCS | Mod: CPTII,S$GLB,, | Performed by: OPTOMETRIST

## 2022-05-18 PROCEDURE — 99999 PR PBB SHADOW E&M-EST. PATIENT-LVL III: ICD-10-PCS | Mod: PBBFAC,,, | Performed by: OPTOMETRIST

## 2022-05-18 PROCEDURE — 99999 PR PBB SHADOW E&M-EST. PATIENT-LVL III: CPT | Mod: PBBFAC,,, | Performed by: OPTOMETRIST

## 2022-05-18 PROCEDURE — 3044F HG A1C LEVEL LT 7.0%: CPT | Mod: CPTII,S$GLB,, | Performed by: OPTOMETRIST

## 2022-05-18 PROCEDURE — 92015 PR REFRACTION: ICD-10-PCS | Mod: S$GLB,,, | Performed by: OPTOMETRIST

## 2022-05-18 NOTE — PROGRESS NOTES
HPI     Diabetic Eye Exam     Comments: Diagnosed with diabetes at the age of 15.  Lab Results       Component                Value               Date                       HGBA1C                   5.7 (H)             02/10/2022                                 Comments     Vision changes since last eye exam? no    Any eye pain today: no    Other ocular symptoms: no    Interested in contact lens fitting today? yes                     Last edited by Adriana Chu on 5/18/2022 11:05 AM. (History)            Assessment /Plan     For exam results, see Encounter Report.    Type 1 diabetes mellitus with hyperglycemia, with long-term current use of insulin  There was no diabetic retinopathy present in either eye today.   Recommended that pt continue care with PCP and/or specialists regarding diabetes.  Follow-up dilated eye exam recommended in 12 months, sooner with any vision changes or new concerns.    Myopia, bilateral  Regular astigmatism of right eye  Eyeglass Final Rx     Eyeglass Final Rx       Sphere Cylinder Axis    Right -6.50 +1.00 010    Left -6.75      Expiration Date: 5/18/2023              Contact Lens Prescription (5/18/2022)        Brand Base Curve Diameter Sphere    Right Acuvue Oasys 8.4 14.0 -5.50    Left Acuvue Oasys 8.4 14.0 -6.00    Expiration Date: 5/18/2023    Wearing Schedule: Daily Wear        Dispensed trial contact lenses today. Patient is to wear lenses for 1 week.   Ok to order supply if no problems. RTC PRN if any problems arise.    Otherwise, RTC 1 yr for dilated eye exam.  Discussed above and answered questions.

## 2022-05-23 ENCOUNTER — OFFICE VISIT (OUTPATIENT)
Dept: DIABETES | Facility: CLINIC | Age: 42
End: 2022-05-23
Payer: COMMERCIAL

## 2022-05-23 DIAGNOSIS — Z96.41 INSULIN PUMP IN PLACE: ICD-10-CM

## 2022-05-23 DIAGNOSIS — E10.65 TYPE 1 DIABETES MELLITUS WITH HYPERGLYCEMIA, WITH LONG-TERM CURRENT USE OF INSULIN: Primary | ICD-10-CM

## 2022-05-23 PROCEDURE — 3044F PR MOST RECENT HEMOGLOBIN A1C LEVEL <7.0%: ICD-10-PCS | Mod: CPTII,95,, | Performed by: PHYSICIAN ASSISTANT

## 2022-05-23 PROCEDURE — 99213 PR OFFICE/OUTPT VISIT, EST, LEVL III, 20-29 MIN: ICD-10-PCS | Mod: 95,,, | Performed by: PHYSICIAN ASSISTANT

## 2022-05-23 PROCEDURE — 99213 OFFICE O/P EST LOW 20 MIN: CPT | Mod: 95,,, | Performed by: PHYSICIAN ASSISTANT

## 2022-05-23 PROCEDURE — 3044F HG A1C LEVEL LT 7.0%: CPT | Mod: CPTII,95,, | Performed by: PHYSICIAN ASSISTANT

## 2022-05-23 NOTE — PROGRESS NOTES
PCP: Lena Go MD    Subjective:     Chief Complaint: Diabetes    Established Patient - Audio Only Telehealth Visit     The patient location is: Home  The chief complaint leading to consultation is: Diabetes follow up  Visit type: Virtual visit with audio only (telephone)  Total Time Spent with Patient: 23 minutes     The reason for the audio only service rather than synchronous audio and video virtual visit was related to technical difficulties or patient preference/necessity.     Each patient to whom I provide medical services by telemedicine is:  (1) informed of the relationship between the physician and patient and the respective role of any other health care provider with respect to management of the patient; and (2) notified that they may decline to receive medical services by telemedicine and may withdraw from such care at any time. Patient verbally consented to receive this service via voice-only telephone call.    This service was not originating from a related E/M service provided within the previous 7 days nor will  to an E/M service or procedure within the next 24 hours or my soonest available appointment.  Prevailing standard of care was able to be met in this audio-only visit.       HISTORY OF PRESENT ILLNESS: 41 y.o.   male presenting for diabetes visit.   The patient's last visit with me was on 4/28/2022.  Patient has had Type I diabetes since age 15.  Pertinent to decision making is the following comorbidities: N/A  Patient has the following Diabetes complications: without complications  He  has attended diabetes education in the past.     Patient's most recent A1c of 5.7% was completed 3 months ago.   Patient states since His last A1c His blood glucose levels have been both high and low throughout the day .   Patient monitors blood glucose 4 times per day and Continuously with personal CGM Dexcom.   Patient blood glucose monitoring device will be uploaded into [employer] [facility]  today.  Interpretation of CGM meter includes Avg  mg/dl. Patient's time in range was 91%, time above range was 7%, and time below ranges was 2%.   Interpretation of pump report includes average TDD of 54.65 units with average basal of 11.42 units or 21%, an average food bolus of 38.81 units or 71%, an correction bolus of 1.02 units or 2%, and an average Control IQ bolus of 3.41 units or 6%. Patient is not changing cartridges at least every 3 days - averaging 4 days.   Patient's records show some postprandial hyperglycemia throughout the day related to undercorrection of carb ratio.  Patient endorses the following diabetes related symptoms: None.  Patient is due today for the following diabetes-related health maintenance standards: Statin therapy per ADA guidelines.   He denies recent hospital admissions or emergency room visits.   He voices having hypoglycemia but overall improving.   Patient's concerns today include glycemic control. Of note, patient has had some increased stress following death of his stepfather.   Patient medication regimen is as below.     CURRENT DM MEDICATIONS:    CS-II Tandem Control IQ with Lispro     INSULIN PUMP SETTINGS:  Insulin pump type: Tandem with Control IQ  Insulin Type: Humalog  Basal rate:    0.600 Units/hour from 0000 hours to 0200 hours,    0.630 Units/hour from 0200 hours to 0600 hours,   0.540 Units/hour from 0600 hours to 1600 hours,    0.525 Units/hour from 1600 hours to 2000 hours,    0.600 Units/hour from 2000 hours to 2400 hours  Insulin to Carbohydrate Ratio: 1/9  Insulin Sensitivity Factor: 1/25   Glucose Target: 115 mg/dl   Active Insulin Time: 5 hours     Other Pump Considerations:   Current Glucagon Kit: yes  Current Ketone Strips: yes  Emergency Plan for Pump Failure:    Basal: Levemir 13 units daily - 15% increase from average basal use    Bolus: Humalog IC 8, ISF 25,      Labs Reviewed.       No results found for: CPEPTIDE  No results found for:  GLUTAMICACID       //   , There is no height or weight on file to calculate BMI.  His blood sugar in clinic today is:    No components found for: POCGLUC      Review of Systems   Constitutional: Negative for activity change, appetite change, chills and fever.   HENT: Negative for dental problem, mouth sores, nosebleeds, sore throat and trouble swallowing.    Eyes: Negative for pain and discharge.   Respiratory: Negative for shortness of breath, wheezing and stridor.    Cardiovascular: Negative for chest pain, palpitations and leg swelling.   Gastrointestinal: Negative for abdominal pain, diarrhea, nausea and vomiting.   Endocrine: Negative for polydipsia, polyphagia and polyuria.   Genitourinary: Negative for dysuria, frequency and urgency.   Musculoskeletal: Negative for joint swelling and myalgias.   Skin: Negative for rash and wound.   Neurological: Negative for dizziness, syncope, weakness and headaches.   Psychiatric/Behavioral: Negative for behavioral problems and dysphoric mood.         Diabetes Management Status  Statin: Taking  ACE/ARB: Taking    Screening or Prevention Patient's value Goal Complete/Controlled?   HgA1C Testing and Control   Lab Results   Component Value Date    HGBA1C 5.7 (H) 02/10/2022      Annually/Less than 8% Yes   Lipid profile : 09/15/2021 Annually Yes   LDL control Lab Results   Component Value Date    LDLCALC 103.2 09/15/2021    Annually/Less than 100 mg/dl  No   Nephropathy screening Lab Results   Component Value Date    MICALBCREAT Unable to calculate 09/15/2021     No results found for: PROTEINUA Annually Yes   Blood pressure BP Readings from Last 1 Encounters:   03/31/22 124/86    Less than 140/90 Yes   Dilated retinal exam : 05/18/2022 Annually Yes    Foot exam   : 09/14/2021 Annually Yes     Social History     Socioeconomic History    Marital status:    Tobacco Use    Smoking status: Never Smoker    Smokeless tobacco: Never Used   Substance and Sexual Activity     Alcohol use: No    Drug use: No    Sexual activity: Yes     Partners: Female   Social History Narrative    The patient is  and has 3 children.  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     Past Medical History:   Diagnosis Date    Diabetes type 1, controlled 1998    Hx of Bell's palsy     Non-seasonal allergic rhinitis     Tinea versicolor        Objective:      Physical Exam  Neurological:      Mental Status: He is alert and oriented to person, place, and time. Mental status is at baseline.   Psychiatric:         Mood and Affect: Mood normal.         Behavior: Behavior normal.         Thought Content: Thought content normal.         Judgment: Judgment normal.           Assessment / Plan:     Type 1 diabetes mellitus with hyperglycemia, with long-term current use of insulin    Insulin pump in place      Additional Plan Details:    - POCT Glucose  - Encouraged continuation of lifestyle changes including regular exercise and limiting carbohydrates to 30-45 grams per meal threes times daily and 15 grams per snack with a limit of two daily.   - Encouraged continued monitoring of blood glucose with maintenance of 4 times daily and Continuously with personal CGM Dexcom.   - Current DM Medication Regimen: Continue CS-II Tandem Control IQ with Lispro. See settings below. Change IC to 8.   - Health Maintenance standards addressed today: None - up to date.   - Updated emergency pump failure plan;   - Nursing Visit: Patient is below goal range for nursing visit for age group and will not need nursing visit at this time .   - Follow up with me in 1 months with A1c prior.    INSULIN PUMP SETTINGS:  Insulin pump type: Tandem with Control IQ  Insulin Type: Humalog  Basal rate:    0.600 Units/hour from 0000 hours to 0200 hours,    0.630 Units/hour from 0200 hours to 0600 hours,   0.540 Units/hour from 0600 hours to 1600 hours,    0.525 Units/hour from 1600 hours to 2000 hours,    0.600 Units/hour from 2000 hours to 2400  hours  Insulin to Carbohydrate Ratio: 1/8  Insulin Sensitivity Factor: 1/25   Glucose Target: 115 mg/dl   Active Insulin Time: 5 hours    Blakeney McKnight, PA-C [facility]

## 2022-06-30 ENCOUNTER — OFFICE VISIT (OUTPATIENT)
Dept: DIABETES | Facility: CLINIC | Age: 42
End: 2022-06-30
Payer: COMMERCIAL

## 2022-06-30 VITALS
HEART RATE: 66 BPM | DIASTOLIC BLOOD PRESSURE: 88 MMHG | BODY MASS INDEX: 25.46 KG/M2 | SYSTOLIC BLOOD PRESSURE: 127 MMHG | WEIGHT: 203.69 LBS

## 2022-06-30 DIAGNOSIS — Z96.41 INSULIN PUMP IN PLACE: ICD-10-CM

## 2022-06-30 DIAGNOSIS — E10.65 TYPE 1 DIABETES MELLITUS WITH HYPERGLYCEMIA, WITH LONG-TERM CURRENT USE OF INSULIN: Primary | ICD-10-CM

## 2022-06-30 LAB — GLUCOSE SERPL-MCNC: 99 MG/DL (ref 70–110)

## 2022-06-30 PROCEDURE — 3074F SYST BP LT 130 MM HG: CPT | Mod: CPTII,S$GLB,, | Performed by: PHYSICIAN ASSISTANT

## 2022-06-30 PROCEDURE — 3008F PR BODY MASS INDEX (BMI) DOCUMENTED: ICD-10-PCS | Mod: CPTII,S$GLB,, | Performed by: PHYSICIAN ASSISTANT

## 2022-06-30 PROCEDURE — 3079F PR MOST RECENT DIASTOLIC BLOOD PRESSURE 80-89 MM HG: ICD-10-PCS | Mod: CPTII,S$GLB,, | Performed by: PHYSICIAN ASSISTANT

## 2022-06-30 PROCEDURE — 95251 CONT GLUC MNTR ANALYSIS I&R: CPT | Mod: S$GLB,,, | Performed by: PHYSICIAN ASSISTANT

## 2022-06-30 PROCEDURE — 99999 PR PBB SHADOW E&M-EST. PATIENT-LVL III: CPT | Mod: PBBFAC,,, | Performed by: PHYSICIAN ASSISTANT

## 2022-06-30 PROCEDURE — 82962 POCT GLUCOSE, HAND-HELD DEVICE: ICD-10-PCS | Mod: S$GLB,,, | Performed by: PHYSICIAN ASSISTANT

## 2022-06-30 PROCEDURE — 3044F PR MOST RECENT HEMOGLOBIN A1C LEVEL <7.0%: ICD-10-PCS | Mod: CPTII,S$GLB,, | Performed by: PHYSICIAN ASSISTANT

## 2022-06-30 PROCEDURE — 99999 PR PBB SHADOW E&M-EST. PATIENT-LVL III: ICD-10-PCS | Mod: PBBFAC,,, | Performed by: PHYSICIAN ASSISTANT

## 2022-06-30 PROCEDURE — 3079F DIAST BP 80-89 MM HG: CPT | Mod: CPTII,S$GLB,, | Performed by: PHYSICIAN ASSISTANT

## 2022-06-30 PROCEDURE — 1159F PR MEDICATION LIST DOCUMENTED IN MEDICAL RECORD: ICD-10-PCS | Mod: CPTII,S$GLB,, | Performed by: PHYSICIAN ASSISTANT

## 2022-06-30 PROCEDURE — 3008F BODY MASS INDEX DOCD: CPT | Mod: CPTII,S$GLB,, | Performed by: PHYSICIAN ASSISTANT

## 2022-06-30 PROCEDURE — 1159F MED LIST DOCD IN RCRD: CPT | Mod: CPTII,S$GLB,, | Performed by: PHYSICIAN ASSISTANT

## 2022-06-30 PROCEDURE — 82962 GLUCOSE BLOOD TEST: CPT | Mod: S$GLB,,, | Performed by: PHYSICIAN ASSISTANT

## 2022-06-30 PROCEDURE — 3074F PR MOST RECENT SYSTOLIC BLOOD PRESSURE < 130 MM HG: ICD-10-PCS | Mod: CPTII,S$GLB,, | Performed by: PHYSICIAN ASSISTANT

## 2022-06-30 PROCEDURE — 95251 PR GLUCOSE MONITOR, 72 HOUR, PHYS INTERP: ICD-10-PCS | Mod: S$GLB,,, | Performed by: PHYSICIAN ASSISTANT

## 2022-06-30 PROCEDURE — 99215 PR OFFICE/OUTPT VISIT, EST, LEVL V, 40-54 MIN: ICD-10-PCS | Mod: S$GLB,,, | Performed by: PHYSICIAN ASSISTANT

## 2022-06-30 PROCEDURE — 99215 OFFICE O/P EST HI 40 MIN: CPT | Mod: S$GLB,,, | Performed by: PHYSICIAN ASSISTANT

## 2022-06-30 PROCEDURE — 3044F HG A1C LEVEL LT 7.0%: CPT | Mod: CPTII,S$GLB,, | Performed by: PHYSICIAN ASSISTANT

## 2022-06-30 NOTE — PROGRESS NOTES
PCP: Lena Go MD    Subjective:     Chief Complaint: Diabetes    HISTORY OF PRESENT ILLNESS: 41 y.o.   male presenting for diabetes visit.   The patient's last visit with me was on 5/23/2022.  Patient has had Type I diabetes since age 15.  Pertinent to decision making is the following comorbidities: N/A  Patient has the following Diabetes complications: without complications  He  has attended diabetes education in the past.     Patient's most recent A1c of 5.7% was completed 4 months ago.   Patient states since His last A1c His blood glucose levels have been both high and low throughout the day .   Patient monitors blood glucose 4 times per day and Continuously with personal CGM Dexcom.   Patient blood glucose monitoring device will be uploaded into [employer] [facility] today.         Patient's records show some some suspension following carb bolusing due to suspected overcorrection of carb ratio.  Patient endorses the following diabetes related symptoms: None.  Patient is due today for the following diabetes-related health maintenance standards: Statin therapy per ADA guidelines.   He denies recent hospital admissions or emergency room visits.   He voices having hypoglycemia but overall improving.   Patient's concerns today include glycemic control.   Patient medication regimen is as below.     CURRENT DM MEDICATIONS:    CS-II Tandem Control IQ with Lispro     INSULIN PUMP SETTINGS:  Insulin pump type: Tandem with Control IQ  Insulin Type: Humalog  Basal rate:    0.600 Units/hour from 0000 hours to 0200 hours,    0.630 Units/hour from 0200 hours to 0600 hours,   0.540 Units/hour from 0600 hours to 1600 hours,    0.525 Units/hour from 1600 hours to 2000 hours,    0.600 Units/hour from 2000 hours to 2400 hours  Insulin to Carbohydrate Ratio: 1/8  Insulin Sensitivity Factor: 1/25   Glucose Target: 115 mg/dl   Active Insulin Time: 5 hours    Other Pump Considerations:   Current Glucagon Kit: yes  Current  Ketone Strips: yes  Emergency Plan for Pump Failure:    Basal: Levemir 13 units daily - 15% increase from average basal use    Bolus: Humalog IC 8, ISF 25,      Labs Reviewed.       No results found for: CPEPTIDE  No results found for: GLUTAMICACID       //  Weight: 92.4 kg (203 lb 11.3 oz), Body mass index is 25.46 kg/m².  His blood sugar in clinic today is:    No components found for: POCGLUC      Review of Systems   Constitutional: Negative for activity change, appetite change, chills and fever.   HENT: Negative for dental problem, mouth sores, nosebleeds, sore throat and trouble swallowing.    Eyes: Negative for pain and discharge.   Respiratory: Negative for shortness of breath, wheezing and stridor.    Cardiovascular: Negative for chest pain, palpitations and leg swelling.   Gastrointestinal: Negative for abdominal pain, diarrhea, nausea and vomiting.   Endocrine: Negative for polydipsia, polyphagia and polyuria.   Genitourinary: Negative for dysuria, frequency and urgency.   Musculoskeletal: Negative for joint swelling and myalgias.   Skin: Negative for rash and wound.   Neurological: Negative for dizziness, syncope, weakness and headaches.   Psychiatric/Behavioral: Negative for behavioral problems and dysphoric mood.         Diabetes Management Status  Statin: Taking  ACE/ARB: Taking    Screening or Prevention Patient's value Goal Complete/Controlled?   HgA1C Testing and Control   Lab Results   Component Value Date    HGBA1C 5.7 (H) 02/10/2022      Annually/Less than 8% Yes   Lipid profile : 09/15/2021 Annually Yes   LDL control Lab Results   Component Value Date    LDLCALC 103.2 09/15/2021    Annually/Less than 100 mg/dl  No   Nephropathy screening Lab Results   Component Value Date    MICALBCREAT Unable to calculate 09/15/2021     No results found for: PROTEINUA Annually Yes   Blood pressure BP Readings from Last 1 Encounters:   06/30/22 127/88    Less than 140/90 Yes   Dilated retinal exam :  05/18/2022 Annually Yes    Foot exam   : 09/14/2021 Annually Yes     Social History     Socioeconomic History    Marital status:    Tobacco Use    Smoking status: Never Smoker    Smokeless tobacco: Never Used   Substance and Sexual Activity    Alcohol use: No    Drug use: No    Sexual activity: Yes     Partners: Female   Social History Narrative    The patient is  and has 3 children.  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     Past Medical History:   Diagnosis Date    Diabetes type 1, controlled 1998    Hx of Bell's palsy     Non-seasonal allergic rhinitis     Tinea versicolor        Objective:      Physical Exam  Neurological:      Mental Status: He is alert and oriented to person, place, and time. Mental status is at baseline.   Psychiatric:         Mood and Affect: Mood normal.         Behavior: Behavior normal.         Thought Content: Thought content normal.         Judgment: Judgment normal.           Assessment / Plan:     Type 1 diabetes mellitus with hyperglycemia, with long-term current use of insulin  -     POCT Glucose, Hand-Held Device    Insulin pump in place      Additional Plan Details:    - POCT Glucose  - Encouraged continuation of lifestyle changes including regular exercise and limiting carbohydrates to 30-45 grams per meal threes times daily and 15 grams per snack with a limit of two daily.   - Encouraged continued monitoring of blood glucose with maintenance of 4 times daily and Continuously with personal CGM Dexcom.   - Current DM Medication Regimen: Continue CS-II Tandem Control IQ with Lispro. See settings below. Change IC to 8.5.   - Health Maintenance standards addressed today: Statin therapy - defer to PCP.   - Updated emergency pump failure plan;   - Nursing Visit: Patient is below goal range for nursing visit for age group and will not need nursing visit at this time .   - Follow up with me in 2 months with A1c prior.    INSULIN PUMP SETTINGS:  Insulin pump type: Tandem  with Control IQ  Insulin Type: Humalog  Basal rate:    0.600 Units/hour from 0000 hours to 0200 hours,    0.630 Units/hour from 0200 hours to 0600 hours,   0.540 Units/hour from 0600 hours to 1600 hours,    0.525 Units/hour from 1600 hours to 2000 hours,    0.600 Units/hour from 2000 hours to 2400 hours  Insulin to Carbohydrate Ratio: 1/8.5  Insulin Sensitivity Factor: 1/25   Glucose Target: 115 mg/dl   Active Insulin Time: 5 hours    Blakeney McKnight, PA-C [facility]    A total of 45 minutes was spent in face to face time, of which over 50 % was spent in counseling patient on disease process, complications, treatment, and side effects of medications.

## 2022-07-27 ENCOUNTER — PATIENT MESSAGE (OUTPATIENT)
Dept: ADMINISTRATIVE | Facility: HOSPITAL | Age: 42
End: 2022-07-27
Payer: COMMERCIAL

## 2022-08-01 ENCOUNTER — PATIENT MESSAGE (OUTPATIENT)
Dept: OPTOMETRY | Facility: CLINIC | Age: 42
End: 2022-08-01
Payer: COMMERCIAL

## 2022-09-07 DIAGNOSIS — E11.9 TYPE 2 DIABETES MELLITUS WITHOUT COMPLICATION: ICD-10-CM

## 2022-11-18 ENCOUNTER — OFFICE VISIT (OUTPATIENT)
Dept: FAMILY MEDICINE | Facility: CLINIC | Age: 42
End: 2022-11-18
Payer: COMMERCIAL

## 2022-11-18 VITALS
HEIGHT: 75 IN | BODY MASS INDEX: 25.63 KG/M2 | TEMPERATURE: 98 F | SYSTOLIC BLOOD PRESSURE: 122 MMHG | OXYGEN SATURATION: 97 % | WEIGHT: 206.13 LBS | DIASTOLIC BLOOD PRESSURE: 80 MMHG | HEART RATE: 74 BPM

## 2022-11-18 DIAGNOSIS — E10.9 CONTROLLED DIABETES MELLITUS TYPE 1 WITHOUT COMPLICATIONS: ICD-10-CM

## 2022-11-18 DIAGNOSIS — Z23 NEEDS FLU SHOT: ICD-10-CM

## 2022-11-18 DIAGNOSIS — Z00.00 PREVENTATIVE HEALTH CARE: Primary | ICD-10-CM

## 2022-11-18 PROCEDURE — 90471 FLU VACCINE (QUAD) GREATER THAN OR EQUAL TO 3YO PRESERVATIVE FREE IM: ICD-10-PCS | Mod: S$GLB,,, | Performed by: FAMILY MEDICINE

## 2022-11-18 PROCEDURE — 3044F HG A1C LEVEL LT 7.0%: CPT | Mod: CPTII,S$GLB,, | Performed by: FAMILY MEDICINE

## 2022-11-18 PROCEDURE — 3079F PR MOST RECENT DIASTOLIC BLOOD PRESSURE 80-89 MM HG: ICD-10-PCS | Mod: CPTII,S$GLB,, | Performed by: FAMILY MEDICINE

## 2022-11-18 PROCEDURE — 99396 PR PREVENTIVE VISIT,EST,40-64: ICD-10-PCS | Mod: 25,S$GLB,, | Performed by: FAMILY MEDICINE

## 2022-11-18 PROCEDURE — 90471 IMMUNIZATION ADMIN: CPT | Mod: S$GLB,,, | Performed by: FAMILY MEDICINE

## 2022-11-18 PROCEDURE — 4010F ACE/ARB THERAPY RXD/TAKEN: CPT | Mod: CPTII,S$GLB,, | Performed by: FAMILY MEDICINE

## 2022-11-18 PROCEDURE — 99999 PR PBB SHADOW E&M-EST. PATIENT-LVL IV: CPT | Mod: PBBFAC,,, | Performed by: FAMILY MEDICINE

## 2022-11-18 PROCEDURE — 1160F PR REVIEW ALL MEDS BY PRESCRIBER/CLIN PHARMACIST DOCUMENTED: ICD-10-PCS | Mod: CPTII,S$GLB,, | Performed by: FAMILY MEDICINE

## 2022-11-18 PROCEDURE — 1160F RVW MEDS BY RX/DR IN RCRD: CPT | Mod: CPTII,S$GLB,, | Performed by: FAMILY MEDICINE

## 2022-11-18 PROCEDURE — 1159F MED LIST DOCD IN RCRD: CPT | Mod: CPTII,S$GLB,, | Performed by: FAMILY MEDICINE

## 2022-11-18 PROCEDURE — 3008F PR BODY MASS INDEX (BMI) DOCUMENTED: ICD-10-PCS | Mod: CPTII,S$GLB,, | Performed by: FAMILY MEDICINE

## 2022-11-18 PROCEDURE — 99396 PREV VISIT EST AGE 40-64: CPT | Mod: 25,S$GLB,, | Performed by: FAMILY MEDICINE

## 2022-11-18 PROCEDURE — 90686 IIV4 VACC NO PRSV 0.5 ML IM: CPT | Mod: S$GLB,,, | Performed by: FAMILY MEDICINE

## 2022-11-18 PROCEDURE — 1159F PR MEDICATION LIST DOCUMENTED IN MEDICAL RECORD: ICD-10-PCS | Mod: CPTII,S$GLB,, | Performed by: FAMILY MEDICINE

## 2022-11-18 PROCEDURE — 3079F DIAST BP 80-89 MM HG: CPT | Mod: CPTII,S$GLB,, | Performed by: FAMILY MEDICINE

## 2022-11-18 PROCEDURE — 3074F SYST BP LT 130 MM HG: CPT | Mod: CPTII,S$GLB,, | Performed by: FAMILY MEDICINE

## 2022-11-18 PROCEDURE — 90686 FLU VACCINE (QUAD) GREATER THAN OR EQUAL TO 3YO PRESERVATIVE FREE IM: ICD-10-PCS | Mod: S$GLB,,, | Performed by: FAMILY MEDICINE

## 2022-11-18 PROCEDURE — 99999 PR PBB SHADOW E&M-EST. PATIENT-LVL IV: ICD-10-PCS | Mod: PBBFAC,,, | Performed by: FAMILY MEDICINE

## 2022-11-18 PROCEDURE — 3044F PR MOST RECENT HEMOGLOBIN A1C LEVEL <7.0%: ICD-10-PCS | Mod: CPTII,S$GLB,, | Performed by: FAMILY MEDICINE

## 2022-11-18 PROCEDURE — 4010F PR ACE/ARB THEARPY RXD/TAKEN: ICD-10-PCS | Mod: CPTII,S$GLB,, | Performed by: FAMILY MEDICINE

## 2022-11-18 PROCEDURE — 3008F BODY MASS INDEX DOCD: CPT | Mod: CPTII,S$GLB,, | Performed by: FAMILY MEDICINE

## 2022-11-18 PROCEDURE — 3074F PR MOST RECENT SYSTOLIC BLOOD PRESSURE < 130 MM HG: ICD-10-PCS | Mod: CPTII,S$GLB,, | Performed by: FAMILY MEDICINE

## 2022-11-18 RX ORDER — LISINOPRIL 5 MG/1
5 TABLET ORAL DAILY
Qty: 90 TABLET | Refills: 3 | Status: SHIPPED | OUTPATIENT
Start: 2022-11-18 | End: 2023-11-26

## 2022-11-18 RX ORDER — EPINEPHRINE 0.3 MG/.3ML
INJECTION, SOLUTION INTRAMUSCULAR
COMMUNITY
Start: 2022-09-29

## 2022-11-18 RX ORDER — ATORVASTATIN CALCIUM 10 MG/1
10 TABLET, FILM COATED ORAL DAILY
Qty: 90 TABLET | Refills: 3 | Status: SHIPPED | OUTPATIENT
Start: 2022-11-18 | End: 2023-11-25

## 2022-11-18 NOTE — PROGRESS NOTES
CHIEF COMPLAINT:  This is a 42-year-old male here for preventive health exam.     SUBJECTIVE: Patient is a type I diabetic with onset at age 16.  A1c 3 months ago was 5.7%.  Patient uses a CGM and is considering an insulin pump.  He takes basal and bolus insulin.   Patient denies polyuria, polydipsia, polyphagia.  His weight is essentially unchanged with a BMI of 25.76.  He exercises regularly.  He is not taking lisinopril 5 mg daily as prescribed.     Eye exam May 2020. Tdap April 2016. Pneumovax November 1999.  Influenza vaccine November 2020. COVID 19 March, April, December 2021.     ROS:  GENERAL:  Patient denies fever, chills, night sweats.  Patient denies weight gain or loss. Patient denies anorexia, fatigue, weakness or swollen glands.  SKIN: Patient denies rash or hair loss.  HEENT:  Patient denies sore throat, ear pain, hearing loss, nasal congestion, or runny nose. Patient denies visual disturbance, eye irritation or discharge.  LUNGS: Patient denies cough, wheeze or hemoptysis.  CARDIOVASCULAR: Patient denies chest pain, shortness of breath, palpitations, syncope or lower extremity edema.  GI:  Patient denies abdominal pain, nausea, vomiting, diarrhea, constipation, blood in stool or melena.  GENITOURINARY:  Patient denies dysuria, frequency, hematuria, nocturia, urgency or incontinence.  MUSCULOSKELETAL: Patient denies joint pain, swelling, redness or warmth.  NEUROLOGIC: Patient denies headache, vertigo, paresthesias, weakness in limb, dysarthria, dysphagia or abnormality of gait.  PSYCHIATRIC: Patient denies anxiety, depression, or memory loss.     OBJECTIVE:   GENERAL:  Well-developed well-nourished male alert and oriented x3, in no acute distress.  Memory, judgment and cognition without deficit.   SKIN: Clear without rash.  Normal color and tone.  HEENT: Eyes: Clear conjunctivae.  No scleral icterus.  Pupils equal reactive to light and accommodation.  Ears: Clear canals. Clear TMs.  Nose: Without  congestion.  Pharynx: Without injection or exudates.  NECK: Supple, normal range of motion.  No masses, nodes or enlarged thyroid.  No JVD.  Carotids 2+ and equal.  No bruits.  LUNGS: Clear to auscultation.  Normal respiratory effort.  CARDIOVASCULAR: Regular rhythm, normal S1, S2 without murmur, gallop or rub.  BACK: No CVA or spinal tenderness.  ABDOMEN: Normal appearance.  Active bowel sounds.  Soft, nontender without mass or organomegaly.  No rebound or guarding.  EXTREMITIES: Without cyanosis, clubbing or edema.  Distal pulses 2+ and equal.  Normal range of motion in all extremities.  No joint effusion, erythema or warmth.  NEUROLOGIC:   Cranial nerves II through XII without deficit.  Motor strength equal bilaterally.  Sensation normal to touch.  Deep tendon reflexes 2+ and equal.  Gait without abnormality.  No tremor.  Negative cerebellar signs.    FOOT EVALUATION: 10 gram monofilament exam with protective sensation intact bilaterally. Nails appropriately trimmed. No ulcers. Distal pulses palpable.    ASSESSMENT:  1. Preventative health care    2. Controlled diabetes mellitus type 1 without complications      PLAN:  1. Exercise regularly.  2. Age-appropriate counseling.  3. Fasting lab.  4. Start lisinopril 5 mg daily.  5. Start atorvastatin 10 mg daily.    6. Influenza vaccine.    7. Follow-up in 6 months.    This note is generated with speech recognition software and is subject to transcription error and sound alike phrases that may be missed by proofreading.

## 2022-11-21 ENCOUNTER — LAB VISIT (OUTPATIENT)
Dept: LAB | Facility: HOSPITAL | Age: 42
End: 2022-11-21
Attending: FAMILY MEDICINE
Payer: COMMERCIAL

## 2022-11-21 DIAGNOSIS — E10.9 CONTROLLED DIABETES MELLITUS TYPE 1 WITHOUT COMPLICATIONS: ICD-10-CM

## 2022-11-21 DIAGNOSIS — Z00.00 PREVENTATIVE HEALTH CARE: ICD-10-CM

## 2022-11-21 LAB
ALBUMIN SERPL BCP-MCNC: 3.7 G/DL (ref 3.5–5.2)
ALBUMIN/CREAT UR: 4.9 UG/MG (ref 0–30)
ALP SERPL-CCNC: 76 U/L (ref 55–135)
ALT SERPL W/O P-5'-P-CCNC: 21 U/L (ref 10–44)
ANION GAP SERPL CALC-SCNC: 4 MMOL/L (ref 8–16)
AST SERPL-CCNC: 21 U/L (ref 10–40)
BASOPHILS # BLD AUTO: 0.03 K/UL (ref 0–0.2)
BASOPHILS NFR BLD: 0.5 % (ref 0–1.9)
BILIRUB SERPL-MCNC: 0.6 MG/DL (ref 0.1–1)
BUN SERPL-MCNC: 13 MG/DL (ref 6–20)
CALCIUM SERPL-MCNC: 8.9 MG/DL (ref 8.7–10.5)
CHLORIDE SERPL-SCNC: 104 MMOL/L (ref 95–110)
CHOLEST SERPL-MCNC: 181 MG/DL (ref 120–199)
CHOLEST/HDLC SERPL: 4 {RATIO} (ref 2–5)
CO2 SERPL-SCNC: 29 MMOL/L (ref 23–29)
CREAT SERPL-MCNC: 0.9 MG/DL (ref 0.5–1.4)
CREAT UR-MCNC: 144 MG/DL (ref 23–375)
DIFFERENTIAL METHOD: NORMAL
EOSINOPHIL # BLD AUTO: 0.2 K/UL (ref 0–0.5)
EOSINOPHIL NFR BLD: 3.8 % (ref 0–8)
ERYTHROCYTE [DISTWIDTH] IN BLOOD BY AUTOMATED COUNT: 13.5 % (ref 11.5–14.5)
EST. GFR  (NO RACE VARIABLE): >60 ML/MIN/1.73 M^2
ESTIMATED AVG GLUCOSE: 128 MG/DL (ref 68–131)
GLUCOSE SERPL-MCNC: 144 MG/DL (ref 70–110)
HBA1C MFR BLD: 6.1 % (ref 4–5.6)
HCT VFR BLD AUTO: 45.3 % (ref 40–54)
HDLC SERPL-MCNC: 45 MG/DL (ref 40–75)
HDLC SERPL: 24.9 % (ref 20–50)
HGB BLD-MCNC: 14.8 G/DL (ref 14–18)
IMM GRANULOCYTES # BLD AUTO: 0 K/UL (ref 0–0.04)
IMM GRANULOCYTES NFR BLD AUTO: 0 % (ref 0–0.5)
LDLC SERPL CALC-MCNC: 124.6 MG/DL (ref 63–159)
LYMPHOCYTES # BLD AUTO: 1.8 K/UL (ref 1–4.8)
LYMPHOCYTES NFR BLD: 29 % (ref 18–48)
MCH RBC QN AUTO: 29.4 PG (ref 27–31)
MCHC RBC AUTO-ENTMCNC: 32.7 G/DL (ref 32–36)
MCV RBC AUTO: 90 FL (ref 82–98)
MICROALBUMIN UR DL<=1MG/L-MCNC: 7 UG/ML
MONOCYTES # BLD AUTO: 0.5 K/UL (ref 0.3–1)
MONOCYTES NFR BLD: 8.5 % (ref 4–15)
NEUTROPHILS # BLD AUTO: 3.6 K/UL (ref 1.8–7.7)
NEUTROPHILS NFR BLD: 58.2 % (ref 38–73)
NONHDLC SERPL-MCNC: 136 MG/DL
NRBC BLD-RTO: 0 /100 WBC
PLATELET # BLD AUTO: 212 K/UL (ref 150–450)
PMV BLD AUTO: 11.6 FL (ref 9.2–12.9)
POTASSIUM SERPL-SCNC: 4.4 MMOL/L (ref 3.5–5.1)
PROT SERPL-MCNC: 6.6 G/DL (ref 6–8.4)
RBC # BLD AUTO: 5.04 M/UL (ref 4.6–6.2)
SODIUM SERPL-SCNC: 137 MMOL/L (ref 136–145)
TRIGL SERPL-MCNC: 57 MG/DL (ref 30–150)
TSH SERPL DL<=0.005 MIU/L-ACNC: 0.77 UIU/ML (ref 0.4–4)
WBC # BLD AUTO: 6.13 K/UL (ref 3.9–12.7)

## 2022-11-21 PROCEDURE — 83036 HEMOGLOBIN GLYCOSYLATED A1C: CPT | Performed by: FAMILY MEDICINE

## 2022-11-21 PROCEDURE — 84443 ASSAY THYROID STIM HORMONE: CPT | Performed by: FAMILY MEDICINE

## 2022-11-21 PROCEDURE — 80061 LIPID PANEL: CPT | Performed by: FAMILY MEDICINE

## 2022-11-21 PROCEDURE — 80053 COMPREHEN METABOLIC PANEL: CPT | Performed by: FAMILY MEDICINE

## 2022-11-21 PROCEDURE — 36415 COLL VENOUS BLD VENIPUNCTURE: CPT | Mod: PO | Performed by: FAMILY MEDICINE

## 2022-11-21 PROCEDURE — 82043 UR ALBUMIN QUANTITATIVE: CPT | Performed by: FAMILY MEDICINE

## 2022-11-21 PROCEDURE — 82570 ASSAY OF URINE CREATININE: CPT | Performed by: FAMILY MEDICINE

## 2022-11-21 PROCEDURE — 85025 COMPLETE CBC W/AUTO DIFF WBC: CPT | Performed by: FAMILY MEDICINE

## 2023-05-08 ENCOUNTER — OFFICE VISIT (OUTPATIENT)
Dept: DIABETES | Facility: CLINIC | Age: 43
End: 2023-05-08
Payer: COMMERCIAL

## 2023-05-08 DIAGNOSIS — E10.65 TYPE 1 DIABETES MELLITUS WITH HYPERGLYCEMIA, WITH LONG-TERM CURRENT USE OF INSULIN: Primary | ICD-10-CM

## 2023-05-08 DIAGNOSIS — Z96.41 INSULIN PUMP IN PLACE: ICD-10-CM

## 2023-05-08 PROCEDURE — 4010F PR ACE/ARB THEARPY RXD/TAKEN: ICD-10-PCS | Mod: CPTII,95,, | Performed by: PHYSICIAN ASSISTANT

## 2023-05-08 PROCEDURE — 99214 OFFICE O/P EST MOD 30 MIN: CPT | Mod: 95,,, | Performed by: PHYSICIAN ASSISTANT

## 2023-05-08 PROCEDURE — 4010F ACE/ARB THERAPY RXD/TAKEN: CPT | Mod: CPTII,95,, | Performed by: PHYSICIAN ASSISTANT

## 2023-05-08 PROCEDURE — 99214 PR OFFICE/OUTPT VISIT, EST, LEVL IV, 30-39 MIN: ICD-10-PCS | Mod: 95,,, | Performed by: PHYSICIAN ASSISTANT

## 2023-05-08 NOTE — PROGRESS NOTES
PCP: Lena Go MD    Subjective:     Chief Complaint: Diabetes    TELEMEDICINE VISIT:     The patient location is: Home  The chief complaint leading to consultation is: Diabetes Follow up  Visit type: Virtual visit with synchronous audio and video  Total time spent with patient: 15  Each patient to whom he or she provides medical services by telemedicine is:  (1) informed of the relationship between the physician and patient and the respective role of any other health care provider with respect to management of the patient; and (2) notified that he or she may decline to receive medical services by telemedicine and may withdraw from such care at any time.    Notes: N/A      HISTORY OF PRESENT ILLNESS: 42 y.o.   male presenting for diabetes visit.   The patient's last visit with me was on 6/30/2022.  Patient has had Type I diabetes since age 15.  Pertinent to decision making is the following comorbidities:  N/A  Patient has the following Diabetes complications: without complications  He  has attended diabetes education in the past.     Patient's most recent A1c of 6.1% was completed 6 months ago.   Patient states since His last A1c His blood glucose levels have been both high and low throughout the day .   Patient monitors blood glucose 4 times per day and Continuously with personal CGM Dexcom.   Patient blood glucose monitoring device will be uploaded into [employer] today.         Patient's records show some some suspension following carb bolusing due to suspected overcorrection of carb ratio.  Patient endorses the following diabetes related symptoms: None.  Patient is due today for the following diabetes-related health maintenance standards: Statin therapy per ADA guidelines.   He denies recent hospital admissions or emergency room visits.   He voices having hypoglycemia but overall improving.   Patient's concerns today include glycemic control.   Patient medication regimen is as below.      CURRENT DM MEDICATIONS:   CS-II Tandem Control IQ with Lispro     INSULIN PUMP SETTINGS:  Insulin pump type: Tandem with Control IQ  Insulin Type: Humalog  Basal rate:    1.500 Units/hour from 0000 hours to 0200 hours,    1.200 Units/hour from 0200 hours to 0600 hours,   1.000 Units/hour from 0600 hours to 1600 hours,    0.750 Units/hour from 1600 hours to 2200 hours,    1.000 Units/hour from 2200 hours to 2400 hours  Insulin to Carbohydrate Ratio: 1/8.5  Insulin Sensitivity Factor: 1/25   Glucose Target: 115 mg/dl   Active Insulin Time: 5 hours    Other Pump Considerations:   Current Glucagon Kit: yes  Current Ketone Strips: yes  Emergency Plan for Pump Failure:   Long Acting Insulin Dosing:   Levemir 19 units daily  Short Acting Insulin Dosing:   Lispro per IC of 8.5 (or 1 unit for every 8.5g of carbs) and ISF/CF of 35 (or 1 unit for every 35 pts above goal blood sugar of 120)    Labs Reviewed.       No results found for: CPEPTIDE  No results found for: GLUTAMICACID       //   , There is no height or weight on file to calculate BMI.  His blood sugar in clinic today is:    No components found for: POCGLUC      Review of Systems   Constitutional:  Negative for activity change, appetite change, chills and fever.   HENT:  Negative for dental problem, mouth sores, nosebleeds, sore throat and trouble swallowing.    Eyes:  Negative for pain and discharge.   Respiratory:  Negative for shortness of breath, wheezing and stridor.    Cardiovascular:  Negative for chest pain, palpitations and leg swelling.   Gastrointestinal:  Negative for abdominal pain, diarrhea, nausea and vomiting.   Endocrine: Negative for polydipsia, polyphagia and polyuria.   Genitourinary:  Negative for dysuria, frequency and urgency.   Musculoskeletal:  Negative for joint swelling and myalgias.   Skin:  Negative for rash and wound.   Neurological:  Negative for dizziness, syncope, weakness and headaches.   Psychiatric/Behavioral:  Negative for  behavioral problems and dysphoric mood.        Diabetes Management Status  Statin: Taking  ACE/ARB: Taking    Screening or Prevention Patient's value Goal Complete/Controlled?   HgA1C Testing and Control   Lab Results   Component Value Date    HGBA1C 6.1 (H) 11/21/2022      Annually/Less than 8% Yes   Lipid profile : 11/21/2022 Annually Yes   LDL control Lab Results   Component Value Date    LDLCALC 124.6 11/21/2022    Annually/Less than 100 mg/dl  No   Nephropathy screening Lab Results   Component Value Date    MICALBCREAT 4.9 11/21/2022     No results found for: PROTEINUA Annually Yes   Blood pressure BP Readings from Last 1 Encounters:   11/18/22 122/80    Less than 140/90 Yes   Dilated retinal exam : 05/18/2022 Annually Yes    Foot exam   : 09/14/2021 Annually Yes     Social History     Socioeconomic History    Marital status:    Tobacco Use    Smoking status: Never    Smokeless tobacco: Never   Substance and Sexual Activity    Alcohol use: No    Drug use: No    Sexual activity: Yes     Partners: Female   Social History Narrative    The patient is  and has 3 children.  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     Past Medical History:   Diagnosis Date    Diabetes type 1, controlled 1998    Hx of Bell's palsy     Non-seasonal allergic rhinitis     Tinea versicolor        Objective:      Physical Exam  Constitutional:       General: He is not in acute distress.     Appearance: He is well-developed. He is not diaphoretic.   HENT:      Head: Normocephalic and atraumatic.      Right Ear: External ear normal.      Left Ear: External ear normal.      Nose: Nose normal.   Eyes:      General:         Right eye: No discharge.         Left eye: No discharge.   Pulmonary:      Effort: Pulmonary effort is normal. No respiratory distress.      Breath sounds: No stridor.   Musculoskeletal:         General: Normal range of motion.      Cervical back: Normal range of motion.   Skin:     Coloration: Skin is not pale.    Neurological:      Mental Status: He is alert and oriented to person, place, and time. Mental status is at baseline.      Motor: No abnormal muscle tone.      Coordination: Coordination normal.   Psychiatric:         Mood and Affect: Mood normal.         Behavior: Behavior normal.         Thought Content: Thought content normal.         Judgment: Judgment normal.         Assessment / Plan:     Type 1 diabetes mellitus with hyperglycemia, with long-term current use of insulin  -     Hemoglobin A1C; Standing    Insulin pump in place      Additional Plan Details:    - POCT Glucose  - Encouraged continuation of lifestyle changes including regular exercise and limiting carbohydrates to 30-45 grams per meal threes times daily and 15 grams per snack with a limit of two daily.   - Encouraged continued monitoring of blood glucose with maintenance of 4 times daily and Continuously with personal CGM Dexcom.   - Current DM Medication Regimen: Continue CS-II Tandem Control IQ with Lispro. See settings below. Change CF to 35. Change BR as below.   - Health Maintenance standards addressed today: Foot Exam - deferred by patient today because Telemedicine or Telephone visit, Eye Exam - will be completed outside of [facility] and patient will schedule, and COVID - 19 Vaccine - patient will schedule outside of [facility] .   - Updated emergency pump failure plan;   - Nursing Visit: Patient is below goal range for nursing visit for age group and will not need nursing visit at this time .   - Follow up with me in 3 months with A1c prior.    INSULIN PUMP SETTINGS:  Insulin pump type: Tandem with Control IQ  Insulin Type: Humalog  Basal rate:    1.500 Units/hour from 0000 hours to 0200 hours,    1.050 Units/hour from 0200 hours to 0600 hours,   1.000 Units/hour from 0600 hours to 1600 hours,    0.750 Units/hour from 1600 hours to 2200 hours,    1.000 Units/hour from 2200 hours to 2400 hours  Insulin to Carbohydrate Ratio: 1/8.5  Insulin  Sensitivity Factor: 1/35  Glucose Target: 115 mg/dl   Active Insulin Time: 5 hours    Blakeney McKnight, PA-C [facility]

## 2023-05-08 NOTE — PATIENT INSTRUCTIONS
U100 Pump Failure Plan:   We have decided to develop a plan in the event that your pump breaks or is nonfunctioning. After 4 hours without pump use, you should begin to consider putting your Pump Failure Back Up Plan into action especially if you foresee that you may not be able to use your pump for more than 20 hours. Since you are currently using short acting insulin (Humalog/Novolog/Admelog/Novolin R) in your pump, you will need access to your short acting insulin vial, syringes, and a back up long acting insulin in the event of a pump failure. I have sent a prescription for a long acting insulin to your pharmacy for use during your emergency pump failure plan. It is important that you keep both types of insulin/syringes with you so that you may have access to it at least 3 times daily if needed. This medication and syringes should be brought with you on overnight trips in the case of an emergency pump failure. This means making a plan to keeping your insulin and syringes at school, work, or other places you frequent. If needed, please reach out to my office about any letters you may need for permission to keep these items for emergency purposes. We will outline your plan for pump failure emergencies below, but please remember to contact the insulin pump company (toll free number is printed on the label on the back of the insulin pump) and our office if you have a pump failure. When the insulin pump is restarted, do not restart basal rates until at least 22 hours after the last long acting insulin injection. You can set a 0% temporary basal setting that will last until this time and use your pump to bolus for meals and correction. If you need help with these feature, please call your insulin pump company tech support line or ask them in anticipation of this action.     Dosing:   If the insulin pump is non functional and discontinued for anticipated more than 20 hours, please give daily injections of:    Long  Acting Insulin Dosing:   Levemir 19 units daily  Short Acting Insulin Dosing:   Lispro per IC of 8.5 (or 1 unit for every 8.5g of carbs) and ISF/CF of 35 (or 1 unit for every 35 pts above goal blood sugar of 120)    Ex: If you are giving an injection for a meal, your dose will be 1 unit for every 8.5g of carbs and 1 unit for every 35pts above 120. So if you eat a 50g carb meal and your blood sugar is 250 mg/dl, you would calculate as following and round the units down for your dosing.   Meal Carbs (50) divided by your Carb Ratio (8.5) = 5.88 so you would round down to 5 units to cover your meal.   Blood Sugar (250) - Goal Blood Sugar (120) = 130 and then 130 divided by your Correction Factor (35) = 3.7 so you would round down to 3 units to correct your blood sugar.   Adding these two numbers together would give you 8 units total to take before your meal.     NOTE: This plan is based off of your average insulin needs per recent pump report. Short Acting Insulin should be taken 15 mins before eating a meal and every 2 hours as needed for correction dosing. The dose calculation above may be used for correction dosing by either only calculating the units to cover blood sugar or inputting a carb amount of 0g if not eating. Please not that taking insulin at the 2 hour krunal may mean you still have insulin on board and may lead to hypoglycemia or low blood sugars due to insulin stacking. If calculating correction dosing for the 3rd time or more time, please consider dividing in half to avoid over correction of blood sugar leading to hypoglycemia. Please call office to report to  any large sugar abnormalities including hyperglycemia > 250 and hypoglycemia < 60. Please continue to wear CGM even if not using pump to monitor for blood sugar fluctuations. Please make sure glucose tablets or sugary beverages available in case of hypoglycemia. Please make sure ketone strips are available to you (may buy over the counter from  pharmacy) and monitor urine ketones every 12 hours; if you have two readings og BG > 250 mg/dl; or if signs/symptoms concerning for DKA present including abdominal pain, nausea, vomiting, confusion, trouble breathing, dry mouth, fruity breath or breath that smells like nail polish remover, and excessive thirst/urination. If your urine ketone strips shows trace to small ketones, drink plenty of water and give yourself a correction dose. If BG is less than 250 mg/dL and you do not have moderate/large ketones, you may also exercise to help lower glucose levels. If you have moderate/large ketones, please give correction dosage, drink water, and immediately contact [facility] if during normal business hours. If outside of normal business hours, please give correction dosage, drink water, and report to nearest ED for evaluation/management of DKA.     When the insulin pump is restarted, do not restart basal rates until at least 22 hours after the last long acting insulin injection. You can set a 0% temporary basal setting that will last until this time and still use your pump to bolus for meals and correction. For help with setting temporary basal setting, please call office, view your pump's online resource library, or call 24 hour technical support hotline.     For any technical insulin pump issues, please contact the insulin pump company; the toll free number is printed on the label on the back of the insulin pump.

## 2023-05-19 ENCOUNTER — LAB VISIT (OUTPATIENT)
Dept: LAB | Facility: HOSPITAL | Age: 43
End: 2023-05-19
Attending: PHYSICIAN ASSISTANT
Payer: COMMERCIAL

## 2023-05-19 DIAGNOSIS — E10.65 TYPE 1 DIABETES MELLITUS WITH HYPERGLYCEMIA, WITH LONG-TERM CURRENT USE OF INSULIN: ICD-10-CM

## 2023-05-19 LAB
ESTIMATED AVG GLUCOSE: 114 MG/DL (ref 68–131)
HBA1C MFR BLD: 5.6 % (ref 4–5.6)

## 2023-05-19 PROCEDURE — 83036 HEMOGLOBIN GLYCOSYLATED A1C: CPT | Performed by: PHYSICIAN ASSISTANT

## 2023-05-19 PROCEDURE — 36415 COLL VENOUS BLD VENIPUNCTURE: CPT | Mod: PO | Performed by: PHYSICIAN ASSISTANT

## 2023-06-08 ENCOUNTER — PATIENT MESSAGE (OUTPATIENT)
Dept: FAMILY MEDICINE | Facility: CLINIC | Age: 43
End: 2023-06-08
Payer: COMMERCIAL

## 2023-06-08 DIAGNOSIS — E10.65 TYPE 1 DIABETES MELLITUS WITH HYPERGLYCEMIA, WITH LONG-TERM CURRENT USE OF INSULIN: ICD-10-CM

## 2023-06-08 RX ORDER — INSULIN DETEMIR 100 [IU]/ML
16 INJECTION, SOLUTION SUBCUTANEOUS DAILY
Qty: 6 ML | Refills: 5 | Status: SHIPPED | OUTPATIENT
Start: 2023-06-08 | End: 2024-03-05

## 2023-06-08 NOTE — TELEPHONE ENCOUNTER
No care due was identified.  [facility] Embedded Care Due Messages. Reference number: [id number].   6/08/2023 9:22:27 AM CDT

## 2023-07-05 NOTE — PROGRESS NOTES
Patient's chart was reviewed.   Requested updates within Care Everywhere.  Immunizations reconciled.    Health Maintenance was updated.     [FreeTextEntry1] : 80 year old male with Afib on Metoprolol and Xarelto, unable to take AAD due to current cancer treatment with ILR in place.  Patient presents for routine ILR follow up after hospitalization for a stoke.   Denies any CP, palpitations, SOB, dizziness, lightheadedness, near syncope or syncope.

## 2023-08-08 ENCOUNTER — OFFICE VISIT (OUTPATIENT)
Dept: DIABETES | Facility: CLINIC | Age: 43
End: 2023-08-08
Payer: COMMERCIAL

## 2023-08-08 ENCOUNTER — TELEPHONE (OUTPATIENT)
Dept: DIABETES | Facility: CLINIC | Age: 43
End: 2023-08-08

## 2023-08-08 DIAGNOSIS — Z96.41 INSULIN PUMP IN PLACE: ICD-10-CM

## 2023-08-08 DIAGNOSIS — E10.65 TYPE 1 DIABETES MELLITUS WITH HYPERGLYCEMIA, WITH LONG-TERM CURRENT USE OF INSULIN: Primary | ICD-10-CM

## 2023-08-08 PROCEDURE — 95251 PR GLUCOSE MONITOR, 72 HOUR, PHYS INTERP: ICD-10-PCS | Mod: NDTC,,, | Performed by: PHYSICIAN ASSISTANT

## 2023-08-08 PROCEDURE — 3044F PR MOST RECENT HEMOGLOBIN A1C LEVEL <7.0%: ICD-10-PCS | Mod: CPTII,95,, | Performed by: PHYSICIAN ASSISTANT

## 2023-08-08 PROCEDURE — 99214 OFFICE O/P EST MOD 30 MIN: CPT | Mod: 95,,, | Performed by: PHYSICIAN ASSISTANT

## 2023-08-08 PROCEDURE — 3044F HG A1C LEVEL LT 7.0%: CPT | Mod: CPTII,95,, | Performed by: PHYSICIAN ASSISTANT

## 2023-08-08 PROCEDURE — 95251 CONT GLUC MNTR ANALYSIS I&R: CPT | Mod: NDTC,,, | Performed by: PHYSICIAN ASSISTANT

## 2023-08-08 PROCEDURE — 4010F PR ACE/ARB THEARPY RXD/TAKEN: ICD-10-PCS | Mod: CPTII,95,, | Performed by: PHYSICIAN ASSISTANT

## 2023-08-08 PROCEDURE — 4010F ACE/ARB THERAPY RXD/TAKEN: CPT | Mod: CPTII,95,, | Performed by: PHYSICIAN ASSISTANT

## 2023-08-08 PROCEDURE — 99214 PR OFFICE/OUTPT VISIT, EST, LEVL IV, 30-39 MIN: ICD-10-PCS | Mod: 95,,, | Performed by: PHYSICIAN ASSISTANT

## 2023-08-08 NOTE — PATIENT INSTRUCTIONS
INSULIN PUMP SETTINGS:  Insulin pump type: Tandem with Control IQ  Insulin Type: Humalog  Basal rate:    1.350 Units/hour from 0000 hours to 0200 hours,    1.000 Units/hour from 0200 hours to 0600 hours,   1.000 Units/hour from 0600 hours to 1600 hours,    0.750 Units/hour from 1600 hours to 2200 hours,    1.000 Units/hour from 2200 hours to 2400 hours  Insulin to Carbohydrate Ratio: 1/8  Insulin Sensitivity Factor: 1/40  Glucose Target: 110 mg/dl   Active Insulin Time: 5 hours    U100 Pump Failure Plan:   We have decided to develop a plan in the event that your pump breaks or is nonfunctioning. After 4 hours without pump use, you should begin to consider putting your Pump Failure Back Up Plan into action especially if you foresee that you may not be able to use your pump for more than 20 hours. Since you are currently using short acting insulin (Humalog/Novolog/Admelog/Novolin R) in your pump, you will need access to your short acting insulin vial, syringes, and a back up long acting insulin in the event of a pump failure. I have sent a prescription for a long acting insulin to your pharmacy for use during your emergency pump failure plan. It is important that you keep both types of insulin/syringes with you so that you may have access to it at least 3 times daily if needed. This medication and syringes should be brought with you on overnight trips in the case of an emergency pump failure. This means making a plan to keeping your insulin and syringes at school, work, or other places you frequent. If needed, please reach out to my office about any letters you may need for permission to keep these items for emergency purposes. We will outline your plan for pump failure emergencies below, but please remember to contact the insulin pump company (toll free number is printed on the label on the back of the insulin pump) and our office if you have a pump failure. When the insulin pump is restarted, do not restart basal  rates until at least 22 hours after the last long acting insulin injection. You can set a 0% temporary basal setting that will last until this time and use your pump to bolus for meals and correction. If you need help with these feature, please call your insulin pump company tech support line or ask them in anticipation of this action.     Dosing:   If the insulin pump is non functional and discontinued for anticipated more than 20 hours, please give daily injections of:    Long Acting Insulin Dosing:   Levemir 21 units daily (based on 15% increase from total daily basal)  Short Acting Insulin Dosing:   Lispro per IC of 8 (or 1 unit for every 8g of carbs) and ISF/CF of 40 (or 1 unit for every 40 pts above goal blood sugar of 120)    Ex: If you are giving an injection for a meal, your dose will be 1 unit for every 8g of carbs and 1 unit for every 40pts above 120. So if you eat a 50g carb meal and your blood sugar is 250 mg/dl, you would calculate as following and round the units down for your dosing.   Meal Carbs (50) divided by your Carb Ratio (8) = 6.25 so you would round down to 6 units to cover your meal.   Blood Sugar (250) - Goal Blood Sugar (120) = 130 and then 130 divided by your Correction Factor (40) = 3.25 so you would round down to 3 units to correct your blood sugar.   Adding these two numbers together would give you 9 units total to take before your meal.     NOTE: This plan is based off of your average insulin needs per recent pump report. Short Acting Insulin should be taken 15 mins before eating a meal and every 2 hours as needed for correction dosing. The dose calculation above may be used for correction dosing by either only calculating the units to cover blood sugar or inputting a carb amount of 0g if not eating. Please not that taking insulin at the 2 hour krunal may mean you still have insulin on board and may lead to hypoglycemia or low blood sugars due to insulin stacking. If calculating  correction dosing for the 3rd time or more time, please consider dividing in half to avoid over correction of blood sugar leading to hypoglycemia. Please call office to report to  any large sugar abnormalities including hyperglycemia > 250 and hypoglycemia < 60. Please continue to wear CGM even if not using pump to monitor for blood sugar fluctuations. Please make sure glucose tablets or sugary beverages available in case of hypoglycemia. Please make sure ketone strips are available to you (may buy over the counter from pharmacy) and monitor urine ketones every 12 hours; if you have two readings og BG > 250 mg/dl; or if signs/symptoms concerning for DKA present including abdominal pain, nausea, vomiting, confusion, trouble breathing, dry mouth, fruity breath or breath that smells like nail polish remover, and excessive thirst/urination. If your urine ketone strips shows trace to small ketones, drink plenty of water and give yourself a correction dose. If BG is less than 250 mg/dL and you do not have moderate/large ketones, you may also exercise to help lower glucose levels. If you have moderate/large ketones, please give correction dosage, drink water, and immediately contact [facility] if during normal business hours. If outside of normal business hours, please give correction dosage, drink water, and report to nearest ED for evaluation/management of DKA.     When the insulin pump is restarted, do not restart basal rates until at least 22 hours after the last long acting insulin injection. You can set a 0% temporary basal setting that will last until this time and still use your pump to bolus for meals and correction. For help with setting temporary basal setting, please call office, view your pump's online resource library, or call 24 hour technical support hotline.     For any technical insulin pump issues, please contact the insulin pump company; the toll free number is printed on the label on the  back of the insulin pump.

## 2023-08-08 NOTE — PROGRESS NOTES
PCP: Lena Go MD    Subjective:     Chief Complaint: Diabetes    TELEMEDICINE VISIT:     The patient location is: Home  The chief complaint leading to consultation is: Diabetes Follow up  Visit type: Virtual visit with synchronous audio and video  Total time spent with patient: 30  Each patient to whom he or she provides medical services by telemedicine is:  (1) informed of the relationship between the physician and patient and the respective role of any other health care provider with respect to management of the patient; and (2) notified that he or she may decline to receive medical services by telemedicine and may withdraw from such care at any time.    Notes: N/A      HISTORY OF PRESENT ILLNESS: 42 y.o.   male presenting for diabetes visit.   The patient's last visit with me was on 5/8/2023.   Patient has had Type I diabetes since age 15.  Pertinent to decision making is the following comorbidities:  N/A  Patient has the following Diabetes complications: without complications  He  has attended diabetes education in the past.     Patient's most recent A1c of 5.6% was completed 3 months ago.   Patient states since His last A1c His blood glucose levels have been both high and low throughout the day .   Patient monitors blood glucose 4 times per day and Continuously with personal CGM Dexcom.   Patient blood glucose monitoring device will be uploaded into [employer] today.         Patient's records show some some suspension following correction bolusing due to suspected overcorrection of correction factor and some undercorrection of inputted carb bolusing. Some automated decrease of basal rate from 12a - 6a.  Patient endorses the following diabetes related symptoms: None.  Patient is due today for the following diabetes-related health maintenance standards: Foot Exam , Eye Exam, and COVID-19 Vaccine . Previously seen by Dr. Josue for eye exam. Was seen in January at [facility], [place] on  "[facility].   He denies recent hospital admissions or emergency room visits.   He voices having hypoglycemia but overall improving.   Patient's concerns today include glycemic control.   Patient medication regimen is as below.     CURRENT DM MEDICATIONS:   CS-II Tandem Control IQ with Lispro     INSULIN PUMP SETTINGS:  Insulin pump type: Tandem with Control IQ  Insulin Type: Humalog  Basal rate:    1.500 Units/hour from 0000 hours to 0200 hours,    1.050 Units/hour from 0200 hours to 0600 hours,   1.000 Units/hour from 0600 hours to 1600 hours,    0.750 Units/hour from 1600 hours to 2200 hours,    1.000 Units/hour from 2200 hours to 2400 hours  Insulin to Carbohydrate Ratio: 1/8.5  Insulin Sensitivity Factor: 1/35  Glucose Target: 110 mg/dl   Active Insulin Time: 5 hours    Other Pump Considerations:   Current Glucagon Kit: yes  Current Ketone Strips: yes  Emergency Plan for Pump Failure:   Long Acting Insulin Dosing:   Levemir 21 units daily (based on 15% increase from total daily basal)  Short Acting Insulin Dosing:   Lispro per IC of 8 (or 1 unit for every 8g of carbs) and ISF/CF of 40 (or 1 unit for every 40 pts above goal blood sugar of 120)    Labs Reviewed.       No results found for: "CPEPTIDE"  No results found for: "GLUTAMICACID"       //   , There is no height or weight on file to calculate BMI.  His blood sugar in clinic today is:    No components found for: "POCGLUC"      Review of Systems   Constitutional:  Negative for activity change, appetite change, chills and fever.   HENT:  Negative for dental problem, mouth sores, nosebleeds, sore throat and trouble swallowing.    Eyes:  Negative for pain and discharge.   Respiratory:  Negative for shortness of breath, wheezing and stridor.    Cardiovascular:  Negative for chest pain, palpitations and leg swelling.   Gastrointestinal:  Negative for abdominal pain, diarrhea, nausea and vomiting.   Endocrine: Negative for polydipsia, polyphagia and polyuria. " "  Genitourinary:  Negative for dysuria, frequency and urgency.   Musculoskeletal:  Negative for joint swelling and myalgias.   Skin:  Negative for rash and wound.   Neurological:  Negative for dizziness, syncope, weakness and headaches.   Psychiatric/Behavioral:  Negative for behavioral problems and dysphoric mood.          Diabetes Management Status  Statin: Taking  ACE/ARB: Taking    Screening or Prevention Patient's value Goal Complete/Controlled?   HgA1C Testing and Control   Lab Results   Component Value Date    HGBA1C 5.6 05/19/2023      Annually/Less than 8% Yes   Lipid profile : 11/21/2022 Annually Yes   LDL control Lab Results   Component Value Date    LDLCALC 124.6 11/21/2022    Annually/Less than 100 mg/dl  No   Nephropathy screening Lab Results   Component Value Date    MICALBCREAT 4.9 11/21/2022     No results found for: "PROTEINUA" Annually Yes   Blood pressure BP Readings from Last 1 Encounters:   11/18/22 122/80    Less than 140/90 Yes   Dilated retinal exam : 05/18/2022 Annually Yes    Foot exam   : 09/14/2021 Annually Yes     Social History     Socioeconomic History    Marital status:    Tobacco Use    Smoking status: Never    Smokeless tobacco: Never   Substance and Sexual Activity    Alcohol use: No    Drug use: No    Sexual activity: Yes     Partners: Female   Social History Narrative    The patient is  and has 3 children.  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     Social Determinants of Health     Financial Resource Strain: Low Risk  (7/8/2019)    Overall Financial Resource Strain (CARDIA)     Difficulty of Paying Living Expenses: Not very hard   Food Insecurity: No Food Insecurity (7/8/2019)    Hunger Vital Sign     Worried About Running Out of Food in the Last Year: Never true     Ran Out of Food in the Last Year: Never true   Transportation Needs: No Transportation Needs (7/8/2019)    PRAPARE - Transportation     Lack of Transportation (Medical): No     Lack of Transportation " (Non-Medical): No   Physical Activity: Unknown (7/14/2020)    Exercise Vital Sign     Minutes of Exercise per Session: 20 min   Stress: No Stress Concern Present (7/14/2020)    [employer] - Occupational Stress Questionnaire     Feeling of Stress : Only a little   Social Connections: Unknown (7/14/2020)    Social Connection and Isolation Panel [NHANES]     Frequency of Communication with Friends and Family: Three times a week     Frequency of Social Gatherings with Friends and Family: Twice a week     Attends Club or Organization Meetings: More than 4 times per year     Past Medical History:   Diagnosis Date    Diabetes type 1, controlled 1998    Hx of Bell's palsy     Non-seasonal allergic rhinitis     Tinea versicolor        Objective:      Physical Exam  Constitutional:       General: He is not in acute distress.     Appearance: He is well-developed. He is not diaphoretic.   HENT:      Head: Normocephalic and atraumatic.      Right Ear: External ear normal.      Left Ear: External ear normal.      Nose: Nose normal.   Eyes:      General:         Right eye: No discharge.         Left eye: No discharge.   Pulmonary:      Effort: Pulmonary effort is normal. No respiratory distress.      Breath sounds: No stridor.   Musculoskeletal:         General: Normal range of motion.      Cervical back: Normal range of motion.   Skin:     Coloration: Skin is not pale.   Neurological:      Mental Status: He is alert and oriented to person, place, and time. Mental status is at baseline.      Motor: No abnormal muscle tone.      Coordination: Coordination normal.   Psychiatric:         Mood and Affect: Mood normal.         Behavior: Behavior normal.         Thought Content: Thought content normal.         Judgment: Judgment normal.           Assessment / Plan:     Type 1 diabetes mellitus with hyperglycemia, with long-term current use of insulin  -     Hemoglobin A1C; Standing  -     Lipid Panel;  Future; Expected date: 08/08/2023  -     Comprehensive Metabolic Panel; Future; Expected date: 08/08/2023  -     TSH; Future; Expected date: 08/08/2023  -     T4, Free; Future; Expected date: 08/08/2023  -     Microalbumin/Creatinine Ratio, Urine; Future; Expected date: 08/08/2023  -     CBC Auto Differential; Future; Expected date: 08/08/2023    Insulin pump in place      Additional Plan Details:    - POCT Glucose  - Encouraged continuation of lifestyle changes including regular exercise and limiting carbohydrates to 30-45 grams per meal threes times daily and 15 grams per snack with a limit of two daily.   - Encouraged continued monitoring of blood glucose with maintenance of 4 times daily and Continuously with personal CGM Dexcom.   - Current DM Medication Regimen: Continue CS-II Tandem Control IQ with Lispro. See settings below. Change IC to 8. Change CF to 40. Change BR as below.   - Health Maintenance standards addressed today: Foot Exam - deferred by patient today because Telemedicine or Telephone visit, Eye Exam - completed outside of [facility]; will sign ROR today for records, and COVID - 19 Vaccine - patient will schedule outside of [facility] .   - Updated emergency pump failure plan;   - Fasting labs ordered  - Nursing Visit: Patient is below goal range for nursing visit for age group and will not need nursing visit at this time .   - Follow up with me in 3 months with A1c prior.    INSULIN PUMP SETTINGS:  Insulin pump type: Tandem with Control IQ  Insulin Type: Humalog  Basal rate:    1.350 Units/hour from 0000 hours to 0200 hours,    1.000 Units/hour from 0200 hours to 0600 hours,   1.000 Units/hour from 0600 hours to 1600 hours,    0.750 Units/hour from 1600 hours to 2200 hours,    1.000 Units/hour from 2200 hours to 2400 hours  Insulin to Carbohydrate Ratio: 1/8  Insulin Sensitivity Factor: 1/40  Glucose Target: 110 mg/dl   Active Insulin Time: 5 hours    Blakeney McKnight, PA-C [facility]

## 2023-08-08 NOTE — Clinical Note
"11/8 8a 60 mins in person "Tandem sharing"  Fasting labs at [facility] 1 wk prior to f/u  Call [facility] [email or web address] and get eye records   Dr. Go, was talking to Nino and went ahead and put his fasting labs in for Nov since they will be due then. I ordered: Lipid, Urine micro, CMP, CBC, TSH, Free T4, A1c. Let me know if you want me to add anything else. I can cc them to you when I get results as well. Thank you! "

## 2023-10-17 ENCOUNTER — TELEPHONE (OUTPATIENT)
Dept: DIABETES | Facility: CLINIC | Age: 43
End: 2023-10-17
Payer: COMMERCIAL

## 2023-10-17 DIAGNOSIS — E10.65 TYPE 1 DIABETES MELLITUS WITH HYPERGLYCEMIA, WITH LONG-TERM CURRENT USE OF INSULIN: ICD-10-CM

## 2023-10-17 NOTE — TELEPHONE ENCOUNTER
Called pt to inform them that Humalog will be filled as Generic Humalog starting in January 2024. Pt understood and informed me that he is actually in need of a refill for his Humalog currently. Rx request will be sent to [facility].

## 2023-10-17 NOTE — TELEPHONE ENCOUNTER
Called pt to inform them that Humalog will be filled as Generic Humalog starting in January 2024. Pt understood and informed me that he is actually in need of a refill for his Humalog currently. Rx request will be sent to [facility].    ----- Message from [facility] PA-C sent at 10/17/2023  4:22 PM CDT -----  Please let pt know that his insurance sent us a message to let us know that humalog will need to be run as generic humalog starting Jan 1 at the pharmacy for coverage

## 2023-10-18 RX ORDER — INSULIN LISPRO 100 [IU]/ML
INJECTION, SOLUTION INTRAVENOUS; SUBCUTANEOUS
Qty: 30 ML | Refills: 3 | Status: SHIPPED | OUTPATIENT
Start: 2023-10-18 | End: 2023-11-28

## 2023-11-01 ENCOUNTER — LAB VISIT (OUTPATIENT)
Dept: LAB | Facility: HOSPITAL | Age: 43
End: 2023-11-01
Payer: COMMERCIAL

## 2023-11-01 DIAGNOSIS — E10.65 TYPE 1 DIABETES MELLITUS WITH HYPERGLYCEMIA, WITH LONG-TERM CURRENT USE OF INSULIN: ICD-10-CM

## 2023-11-01 LAB
ALBUMIN SERPL BCP-MCNC: 3.9 G/DL (ref 3.5–5.2)
ALP SERPL-CCNC: 77 U/L (ref 55–135)
ALT SERPL W/O P-5'-P-CCNC: 62 U/L (ref 10–44)
ANION GAP SERPL CALC-SCNC: 10 MMOL/L (ref 8–16)
AST SERPL-CCNC: 129 U/L (ref 10–40)
BASOPHILS # BLD AUTO: 0.04 K/UL (ref 0–0.2)
BASOPHILS NFR BLD: 0.7 % (ref 0–1.9)
BILIRUB SERPL-MCNC: 0.5 MG/DL (ref 0.1–1)
BUN SERPL-MCNC: 15 MG/DL (ref 6–20)
CALCIUM SERPL-MCNC: 8.8 MG/DL (ref 8.7–10.5)
CHLORIDE SERPL-SCNC: 103 MMOL/L (ref 95–110)
CHOLEST SERPL-MCNC: 159 MG/DL (ref 120–199)
CHOLEST/HDLC SERPL: 3.4 {RATIO} (ref 2–5)
CO2 SERPL-SCNC: 25 MMOL/L (ref 23–29)
CREAT SERPL-MCNC: 0.9 MG/DL (ref 0.5–1.4)
DIFFERENTIAL METHOD: NORMAL
EOSINOPHIL # BLD AUTO: 0.2 K/UL (ref 0–0.5)
EOSINOPHIL NFR BLD: 2.8 % (ref 0–8)
ERYTHROCYTE [DISTWIDTH] IN BLOOD BY AUTOMATED COUNT: 12.8 % (ref 11.5–14.5)
EST. GFR  (NO RACE VARIABLE): >60 ML/MIN/1.73 M^2
ESTIMATED AVG GLUCOSE: 123 MG/DL (ref 68–131)
GLUCOSE SERPL-MCNC: 106 MG/DL (ref 70–110)
HBA1C MFR BLD: 5.9 % (ref 4–5.6)
HCT VFR BLD AUTO: 46.6 % (ref 40–54)
HDLC SERPL-MCNC: 47 MG/DL (ref 40–75)
HDLC SERPL: 29.6 % (ref 20–50)
HGB BLD-MCNC: 15.3 G/DL (ref 14–18)
IMM GRANULOCYTES # BLD AUTO: 0.01 K/UL (ref 0–0.04)
IMM GRANULOCYTES NFR BLD AUTO: 0.2 % (ref 0–0.5)
LDLC SERPL CALC-MCNC: 101.2 MG/DL (ref 63–159)
LYMPHOCYTES # BLD AUTO: 1.8 K/UL (ref 1–4.8)
LYMPHOCYTES NFR BLD: 32.4 % (ref 18–48)
MCH RBC QN AUTO: 29.1 PG (ref 27–31)
MCHC RBC AUTO-ENTMCNC: 32.8 G/DL (ref 32–36)
MCV RBC AUTO: 89 FL (ref 82–98)
MONOCYTES # BLD AUTO: 0.5 K/UL (ref 0.3–1)
MONOCYTES NFR BLD: 8.3 % (ref 4–15)
NEUTROPHILS # BLD AUTO: 3 K/UL (ref 1.8–7.7)
NEUTROPHILS NFR BLD: 55.6 % (ref 38–73)
NONHDLC SERPL-MCNC: 112 MG/DL
NRBC BLD-RTO: 0 /100 WBC
PLATELET # BLD AUTO: 206 K/UL (ref 150–450)
PMV BLD AUTO: 11.6 FL (ref 9.2–12.9)
POTASSIUM SERPL-SCNC: 4.7 MMOL/L (ref 3.5–5.1)
PROT SERPL-MCNC: 6.8 G/DL (ref 6–8.4)
RBC # BLD AUTO: 5.26 M/UL (ref 4.6–6.2)
SODIUM SERPL-SCNC: 138 MMOL/L (ref 136–145)
T4 FREE SERPL-MCNC: 0.84 NG/DL (ref 0.71–1.51)
TRIGL SERPL-MCNC: 54 MG/DL (ref 30–150)
TSH SERPL DL<=0.005 MIU/L-ACNC: 0.76 UIU/ML (ref 0.4–4)
WBC # BLD AUTO: 5.4 K/UL (ref 3.9–12.7)

## 2023-11-01 PROCEDURE — 80061 LIPID PANEL: CPT | Performed by: PHYSICIAN ASSISTANT

## 2023-11-01 PROCEDURE — 80053 COMPREHEN METABOLIC PANEL: CPT | Performed by: PHYSICIAN ASSISTANT

## 2023-11-01 PROCEDURE — 84443 ASSAY THYROID STIM HORMONE: CPT | Performed by: PHYSICIAN ASSISTANT

## 2023-11-01 PROCEDURE — 83036 HEMOGLOBIN GLYCOSYLATED A1C: CPT | Performed by: PHYSICIAN ASSISTANT

## 2023-11-01 PROCEDURE — 84439 ASSAY OF FREE THYROXINE: CPT | Performed by: PHYSICIAN ASSISTANT

## 2023-11-01 PROCEDURE — 85025 COMPLETE CBC W/AUTO DIFF WBC: CPT | Performed by: PHYSICIAN ASSISTANT

## 2023-11-01 PROCEDURE — 36415 COLL VENOUS BLD VENIPUNCTURE: CPT | Mod: PO | Performed by: PHYSICIAN ASSISTANT

## 2023-11-02 ENCOUNTER — TELEPHONE (OUTPATIENT)
Dept: DIABETES | Facility: CLINIC | Age: 43
End: 2023-11-02
Payer: COMMERCIAL

## 2023-11-02 ENCOUNTER — PATIENT MESSAGE (OUTPATIENT)
Dept: DIABETES | Facility: CLINIC | Age: 43
End: 2023-11-02
Payer: COMMERCIAL

## 2023-11-02 DIAGNOSIS — R74.8 ELEVATED LIVER ENZYMES: Primary | ICD-10-CM

## 2023-11-02 DIAGNOSIS — R79.89 ELEVATED LFTS: Primary | ICD-10-CM

## 2023-11-02 NOTE — TELEPHONE ENCOUNTER
Labs reviewed with patient's wife. She voiced understanding. Lab appointment scheduled in two weeks.

## 2023-11-02 NOTE — TELEPHONE ENCOUNTER
----- Message from [facility] PA-C sent at 11/2/2023  8:42 AM CDT -----  The following labs have been reviewed and interpreted as following:     A1c: at goal    CMP: elevated LFTs; will repeat CMP in 2 wks - STAFF please sched nonfasting    TSH / T4: normal thyroid     Lipid: well controlled     CBC: within normal limits

## 2023-11-08 ENCOUNTER — IMMUNIZATION (OUTPATIENT)
Dept: INTERNAL MEDICINE | Facility: CLINIC | Age: 43
End: 2023-11-08
Payer: COMMERCIAL

## 2023-11-08 ENCOUNTER — TELEPHONE (OUTPATIENT)
Dept: DIABETES | Facility: CLINIC | Age: 43
End: 2023-11-08

## 2023-11-08 ENCOUNTER — OFFICE VISIT (OUTPATIENT)
Dept: DIABETES | Facility: CLINIC | Age: 43
End: 2023-11-08
Payer: COMMERCIAL

## 2023-11-08 VITALS
WEIGHT: 209 LBS | HEART RATE: 66 BPM | SYSTOLIC BLOOD PRESSURE: 118 MMHG | HEIGHT: 75 IN | BODY MASS INDEX: 25.99 KG/M2 | DIASTOLIC BLOOD PRESSURE: 82 MMHG

## 2023-11-08 DIAGNOSIS — E10.65 TYPE 1 DIABETES MELLITUS WITH HYPERGLYCEMIA, WITH LONG-TERM CURRENT USE OF INSULIN: Primary | ICD-10-CM

## 2023-11-08 DIAGNOSIS — Z96.41 INSULIN PUMP IN PLACE: ICD-10-CM

## 2023-11-08 LAB — GLUCOSE SERPL-MCNC: 147 MG/DL (ref 70–110)

## 2023-11-08 PROCEDURE — 90686 IIV4 VACC NO PRSV 0.5 ML IM: CPT | Mod: S$GLB,,, | Performed by: PEDIATRICS

## 2023-11-08 PROCEDURE — 95251 CONT GLUC MNTR ANALYSIS I&R: CPT | Mod: S$GLB,,, | Performed by: PHYSICIAN ASSISTANT

## 2023-11-08 PROCEDURE — 90686 FLU VACCINE (QUAD) GREATER THAN OR EQUAL TO 3YO PRESERVATIVE FREE IM: ICD-10-PCS | Mod: S$GLB,,, | Performed by: PEDIATRICS

## 2023-11-08 PROCEDURE — 95251 PR GLUCOSE MONITOR, 72 HOUR, PHYS INTERP: ICD-10-PCS | Mod: S$GLB,,, | Performed by: PHYSICIAN ASSISTANT

## 2023-11-08 PROCEDURE — 3074F PR MOST RECENT SYSTOLIC BLOOD PRESSURE < 130 MM HG: ICD-10-PCS | Mod: CPTII,S$GLB,, | Performed by: PHYSICIAN ASSISTANT

## 2023-11-08 PROCEDURE — G0008 FLU VACCINE (QUAD) GREATER THAN OR EQUAL TO 3YO PRESERVATIVE FREE IM: ICD-10-PCS | Mod: S$GLB,,, | Performed by: PEDIATRICS

## 2023-11-08 PROCEDURE — 82962 GLUCOSE BLOOD TEST: CPT | Mod: S$GLB,,, | Performed by: PHYSICIAN ASSISTANT

## 2023-11-08 PROCEDURE — 3079F PR MOST RECENT DIASTOLIC BLOOD PRESSURE 80-89 MM HG: ICD-10-PCS | Mod: CPTII,S$GLB,, | Performed by: PHYSICIAN ASSISTANT

## 2023-11-08 PROCEDURE — 99215 PR OFFICE/OUTPT VISIT, EST, LEVL V, 40-54 MIN: ICD-10-PCS | Mod: 25,S$GLB,, | Performed by: PHYSICIAN ASSISTANT

## 2023-11-08 PROCEDURE — G0008 ADMIN INFLUENZA VIRUS VAC: HCPCS | Mod: S$GLB,,, | Performed by: PEDIATRICS

## 2023-11-08 PROCEDURE — 3066F PR DOCUMENTATION OF TREATMENT FOR NEPHROPATHY: ICD-10-PCS | Mod: CPTII,S$GLB,, | Performed by: PHYSICIAN ASSISTANT

## 2023-11-08 PROCEDURE — 3061F PR NEG MICROALBUMINURIA RESULT DOCUMENTED/REVIEW: ICD-10-PCS | Mod: CPTII,S$GLB,, | Performed by: PHYSICIAN ASSISTANT

## 2023-11-08 PROCEDURE — 3008F BODY MASS INDEX DOCD: CPT | Mod: CPTII,S$GLB,, | Performed by: PHYSICIAN ASSISTANT

## 2023-11-08 PROCEDURE — 3074F SYST BP LT 130 MM HG: CPT | Mod: CPTII,S$GLB,, | Performed by: PHYSICIAN ASSISTANT

## 2023-11-08 PROCEDURE — 99999 PR PBB SHADOW E&M-EST. PATIENT-LVL III: CPT | Mod: PBBFAC,,, | Performed by: PHYSICIAN ASSISTANT

## 2023-11-08 PROCEDURE — 99999 PR PBB SHADOW E&M-EST. PATIENT-LVL III: ICD-10-PCS | Mod: PBBFAC,,, | Performed by: PHYSICIAN ASSISTANT

## 2023-11-08 PROCEDURE — 4010F ACE/ARB THERAPY RXD/TAKEN: CPT | Mod: CPTII,S$GLB,, | Performed by: PHYSICIAN ASSISTANT

## 2023-11-08 PROCEDURE — 3066F NEPHROPATHY DOC TX: CPT | Mod: CPTII,S$GLB,, | Performed by: PHYSICIAN ASSISTANT

## 2023-11-08 PROCEDURE — 82962 POCT GLUCOSE, HAND-HELD DEVICE: ICD-10-PCS | Mod: S$GLB,,, | Performed by: PHYSICIAN ASSISTANT

## 2023-11-08 PROCEDURE — 99215 OFFICE O/P EST HI 40 MIN: CPT | Mod: 25,S$GLB,, | Performed by: PHYSICIAN ASSISTANT

## 2023-11-08 PROCEDURE — 3008F PR BODY MASS INDEX (BMI) DOCUMENTED: ICD-10-PCS | Mod: CPTII,S$GLB,, | Performed by: PHYSICIAN ASSISTANT

## 2023-11-08 PROCEDURE — 3061F NEG MICROALBUMINURIA REV: CPT | Mod: CPTII,S$GLB,, | Performed by: PHYSICIAN ASSISTANT

## 2023-11-08 PROCEDURE — 3079F DIAST BP 80-89 MM HG: CPT | Mod: CPTII,S$GLB,, | Performed by: PHYSICIAN ASSISTANT

## 2023-11-08 PROCEDURE — 3044F PR MOST RECENT HEMOGLOBIN A1C LEVEL <7.0%: ICD-10-PCS | Mod: CPTII,S$GLB,, | Performed by: PHYSICIAN ASSISTANT

## 2023-11-08 PROCEDURE — 4010F PR ACE/ARB THEARPY RXD/TAKEN: ICD-10-PCS | Mod: CPTII,S$GLB,, | Performed by: PHYSICIAN ASSISTANT

## 2023-11-08 PROCEDURE — 3044F HG A1C LEVEL LT 7.0%: CPT | Mod: CPTII,S$GLB,, | Performed by: PHYSICIAN ASSISTANT

## 2023-11-08 RX ORDER — INSULIN PUMP SYRINGE, 3 ML
EACH MISCELLANEOUS
Qty: 1 EACH | Refills: 0 | Status: SHIPPED | OUTPATIENT
Start: 2023-11-08 | End: 2024-11-07

## 2023-11-08 RX ORDER — LANCETS
EACH MISCELLANEOUS
Qty: 100 EACH | Refills: 11 | Status: SHIPPED | OUTPATIENT
Start: 2023-11-08

## 2023-11-08 NOTE — PROGRESS NOTES
PCP: Lena Go MD    Subjective:     Chief Complaint: Diabetes    HISTORY OF PRESENT ILLNESS: 43 y.o.   male presenting for diabetes visit.   The patient's last visit with me was on 8/8/2023.   Patient has had Type I diabetes since age 15.  Pertinent to decision making is the following comorbidities:  N/A  Patient has the following Diabetes complications: without complications  He  has attended diabetes education in the past.     Patient's most recent A1c of 5.9% was completed 1 weeks ago.   Patient states since His last A1c His blood glucose levels have been both high and low throughout the day .   Patient monitors blood glucose 4 times per day and Continuously with personal CGM Dexcom.   Patient blood glucose monitoring device will be uploaded into [employer] today.         Patient's records show  some some suspension following carb bolusing due to suspected overcorrection of carb ratio and otherwise baseline euglycemia most of the day with frequent manual dosing inputted by patient. Some hypoglycemia at night with 10p basal.   Patient endorses the following diabetes related symptoms: None.  Patient is due today for the following diabetes-related health maintenance standards: Foot Exam , Eye Exam, Influenza Vaccine, and COVID-19 Vaccine .   He denies recent hospital admissions or emergency room visits.   He voices having hypoglycemia but overall improving.   Patient's concerns today include glycemic control. Of note, recent CMP showed elevated LFTs without known cause. Repeat labs sched.   Patient medication regimen is as below.     CURRENT DM MEDICATIONS:   CS-II Tandem Control IQ with Lispro     INSULIN PUMP SETTINGS:  Insulin pump type: Tandem with Control IQ  Insulin Type: Humalog  Basal rate:    1.350 Units/hour from 0000 hours to 0200 hours,    1.000 Units/hour from 0200 hours to 0600 hours,   1.000 Units/hour from 0600 hours to 1600 hours,    0.750 Units/hour from 1600 hours to 2200  "hours,    1.000 Units/hour from 2200 hours to 2400 hours  Insulin to Carbohydrate Ratio: 1/8  Insulin Sensitivity Factor: 1/40  Glucose Target: 110 mg/dl   Active Insulin Time: 5 hours    Other Pump Considerations:   Current Glucagon Kit: yes  Current Ketone Strips: yes  Emergency Plan for Pump Failure:   Long Acting Insulin Dosing:   Levemir 24 units daily (based on 15% increase from total daily basal)  Short Acting Insulin Dosing:   Lispro per IC of 10 (or 1 unit for every 10 g of carbs) and ISF/CF of 40 (or 1 unit for every 40 pts above goal blood sugar of 120)    Labs Reviewed.       No results found for: "CPEPTIDE"  No results found for: "GLUTAMICACID"       //   , There is no height or weight on file to calculate BMI.  His blood sugar in clinic today is:    No components found for: "POCGLUC"      Review of Systems   Constitutional:  Negative for activity change, appetite change, chills and fever.   HENT:  Negative for dental problem, mouth sores, nosebleeds, sore throat and trouble swallowing.    Eyes:  Negative for pain and discharge.   Respiratory:  Negative for shortness of breath, wheezing and stridor.    Cardiovascular:  Negative for chest pain, palpitations and leg swelling.   Gastrointestinal:  Negative for abdominal pain, diarrhea, nausea and vomiting.   Endocrine: Negative for polydipsia, polyphagia and polyuria.   Genitourinary:  Negative for dysuria, frequency and urgency.   Musculoskeletal:  Negative for joint swelling and myalgias.   Skin:  Negative for rash and wound.   Neurological:  Negative for dizziness, syncope, weakness and headaches.   Psychiatric/Behavioral:  Negative for behavioral problems and dysphoric mood.          Diabetes Management Status  Statin: Taking  ACE/ARB: Taking    Screening or Prevention Patient's value Goal Complete/Controlled?   HgA1C Testing and Control   Lab Results   Component Value Date    HGBA1C 5.9 (H) 11/01/2023      Annually/Less than 8% Yes   Lipid profile : " "11/01/2023 Annually Yes   LDL control Lab Results   Component Value Date    LDLCALC 101.2 11/01/2023    Annually/Less than 100 mg/dl  No   Nephropathy screening Lab Results   Component Value Date    MICALBCREAT Unable to calculate 11/01/2023     No results found for: "PROTEINUA" Annually Yes   Blood pressure BP Readings from Last 1 Encounters:   11/18/22 122/80    Less than 140/90 Yes   Dilated retinal exam : 05/18/2022 Annually Yes    Foot exam   : 09/14/2021 Annually Yes     Social History     Socioeconomic History    Marital status:    Tobacco Use    Smoking status: Never    Smokeless tobacco: Never   Substance and Sexual Activity    Alcohol use: No    Drug use: No    Sexual activity: Yes     Partners: Female   Social History Narrative    The patient is  and has 3 children.  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     Social Determinants of Health     Financial Resource Strain: Low Risk  (7/8/2019)    Overall Financial Resource Strain (CARDIA)     Difficulty of Paying Living Expenses: Not very hard   Food Insecurity: No Food Insecurity (7/8/2019)    Hunger Vital Sign     Worried About Running Out of Food in the Last Year: Never true     Ran Out of Food in the Last Year: Never true   Transportation Needs: No Transportation Needs (7/8/2019)    PRAPARE - Transportation     Lack of Transportation (Medical): No     Lack of Transportation (Non-Medical): No   Physical Activity: Unknown (7/14/2020)    Exercise Vital Sign     Minutes of Exercise per Session: 20 min   Stress: No Stress Concern Present (7/14/2020)    [employer] [place] [employer] - Occupational Stress Questionnaire     Feeling of Stress : Only a little   Social Connections: Unknown (7/14/2020)    Social Connection and Isolation Panel [NHANES]     Frequency of Communication with Friends and Family: Three times a week     Frequency of Social Gatherings with Friends and Family: Twice a week     Attends Club or Organization Meetings: More than " 4 times per year     Past Medical History:   Diagnosis Date    Diabetes type 1, controlled 1998    Hx of Bell's palsy     Non-seasonal allergic rhinitis     Tinea versicolor        Objective:      Physical Exam  Constitutional:       General: He is not in acute distress.     Appearance: He is well-developed. He is not diaphoretic.   HENT:      Head: Normocephalic and atraumatic.      Right Ear: External ear normal.      Left Ear: External ear normal.      Nose: Nose normal.   Eyes:      General:         Right eye: No discharge.         Left eye: No discharge.      Pupils: Pupils are equal, round, and reactive to light.   Cardiovascular:      Rate and Rhythm: Normal rate and regular rhythm.      Pulses:           Dorsalis pedis pulses are 2+ on the right side and 2+ on the left side.        Posterior tibial pulses are 2+ on the right side and 2+ on the left side.      Heart sounds: Normal heart sounds.   Pulmonary:      Effort: Pulmonary effort is normal. No respiratory distress.      Breath sounds: Normal breath sounds. No stridor.   Abdominal:      Palpations: Abdomen is soft.   Musculoskeletal:         General: Normal range of motion.      Cervical back: Normal range of motion and neck supple.      Right foot: Normal range of motion. No deformity.      Left foot: Normal range of motion. No deformity.   Feet:      Right foot:      Protective Sensation: 6 sites tested.  6 sites sensed.      Skin integrity: No ulcer, blister, skin breakdown, erythema, warmth, callus or dry skin.      Left foot:      Protective Sensation: 6 sites tested.  6 sites sensed.      Skin integrity: No ulcer, blister, skin breakdown, erythema, warmth, callus or dry skin.   Skin:     General: Skin is warm and dry.      Capillary Refill: Capillary refill takes less than 2 seconds.      Coloration: Skin is not pale.   Neurological:      Mental Status: He is alert and oriented to person, place, and time. Mental status is at baseline.      Motor:  No abnormal muscle tone.      Coordination: Coordination normal.   Psychiatric:         Mood and Affect: Mood normal.         Behavior: Behavior normal.         Thought Content: Thought content normal.         Judgment: Judgment normal.           Assessment / Plan:     Type 1 diabetes mellitus with hyperglycemia, with long-term current use of insulin  -     POCT Glucose, Hand-Held Device  -     blood-glucose meter kit; To check BG 3 times daily, to use with insurance preferred meter  Dispense: 1 each; Refill: 0  -     lancets Misc; To check BG 3 times daily, to use with insurance preferred meter  Dispense: 100 each; Refill: 11  -     blood sugar diagnostic Strp; To check BG 3 times daily, to use with insurance preferred meter  Dispense: 100 each; Refill: 11    Insulin pump in place      Additional Plan Details:    - POCT Glucose  - Encouraged continuation of lifestyle changes including regular exercise and limiting carbohydrates to 30-45 grams per meal threes times daily and 15 grams per snack with a limit of two daily.   - Encouraged continued monitoring of blood glucose with maintenance of 4 times daily and Continuously with personal CGM Dexcom. Insurance preferred Meter and supplies Rx today.   - Current DM Medication Regimen: Continue CS-II Tandem Control IQ with Lispro. See settings below. Change IC to 10. Change BR as below.   - Health Maintenance standards addressed today: Foot Exam - completed today and documented in physical exam with feedback to patient about proper diabetic foot care and findings, Eye Exam - completed outside of [facility]; will sign ROR today for records, Flu Shot - to be scheduled today within [facility], and COVID - 19 Vaccine - patient will schedule outside of [facility] .   - Updated emergency pump failure plan;   - Repeat CMP / elevated LFTs work up  - Nursing Visit: Patient is below goal range for nursing visit for age group and will not need nursing visit at this time .   - Follow up with  me in 6 months with A1c prior.    INSULIN PUMP SETTINGS:  Insulin pump type: Tandem with Control IQ  Insulin Type: Humalog  Basal rate:    1.350 Units/hour from 0000 hours to 0200 hours,    1.000 Units/hour from 0200 hours to 0600 hours,   1.000 Units/hour from 0600 hours to 1600 hours,    0.750 Units/hour from 1600 hours to 2200 hours,    0.900 Units/hour from 2200 hours to 2400 hours  Insulin to Carbohydrate Ratio: 1/10  Insulin Sensitivity Factor: 1/40  Glucose Target: 110 mg/dl   Active Insulin Time: 5 hours    Blakeney McKnight, PA-C [facility]    A total of 40 minutes was spent in face to face time, of which over 50 % was spent in counseling patient on disease process, complications, treatment, and side effects of medications.

## 2023-11-08 NOTE — PATIENT INSTRUCTIONS
U100 Pump Failure Plan:   We have decided to develop a plan in the event that your pump breaks or is nonfunctioning. After 4 hours without pump use, you should begin to consider putting your Pump Failure Back Up Plan into action especially if you foresee that you may not be able to use your pump for more than 20 hours. Since you are currently using short acting insulin (Humalog/Novolog/Admelog/Novolin R) in your pump, you will need access to your short acting insulin vial, syringes, and a back up long acting insulin in the event of a pump failure. I have sent a prescription for a long acting insulin to your pharmacy for use during your emergency pump failure plan. It is important that you keep both types of insulin/syringes with you so that you may have access to it at least 3 times daily if needed. This medication and syringes should be brought with you on overnight trips in the case of an emergency pump failure. This means making a plan to keeping your insulin and syringes at school, work, or other places you frequent. If needed, please reach out to my office about any letters you may need for permission to keep these items for emergency purposes. We will outline your plan for pump failure emergencies below, but please remember to contact the insulin pump company (toll free number is printed on the label on the back of the insulin pump) and our office if you have a pump failure. When the insulin pump is restarted, do not restart basal rates until at least 22 hours after the last long acting insulin injection. You can set a 0% temporary basal setting that will last until this time and use your pump to bolus for meals and correction. If you need help with these feature, please call your insulin pump company tech support line or ask them in anticipation of this action.     Dosing:   If the insulin pump is non functional and discontinued for anticipated more than 20 hours, please give daily injections of:    Long  Acting Insulin Dosing:   Levemir 24 units daily (based on 15% increase from total daily basal)  Short Acting Insulin Dosing:   Lispro per IC of 10 (or 1 unit for every 10 g of carbs) and ISF/CF of 40 (or 1 unit for every 40 pts above goal blood sugar of 120)    Ex: If you are giving an injection for a meal, your dose will be 1 unit for every 10g of carbs and 1 unit for every 40pts above 120. So if you eat a 50g carb meal and your blood sugar is 250 mg/dl, you would calculate as following and round the units down for your dosing.   Meal Carbs (50) divided by your Carb Ratio (10) = 5 so you would round down to 5 units to cover your meal.   Blood Sugar (250) - Goal Blood Sugar (120) = 130 and then 130 divided by your Correction Factor (40) = 3.25 so you would round down to 3 units to correct your blood sugar.   Adding these two numbers together would give you 8 units total to take before your meal.     NOTE: This plan is based off of your average insulin needs per recent pump report. Short Acting Insulin should be taken 15 mins before eating a meal and every 2 hours as needed for correction dosing. The dose calculation above may be used for correction dosing by either only calculating the units to cover blood sugar or inputting a carb amount of 0g if not eating. Please not that taking insulin at the 2 hour krunal may mean you still have insulin on board and may lead to hypoglycemia or low blood sugars due to insulin stacking. If calculating correction dosing for the 3rd time or more time, please consider dividing in half to avoid over correction of blood sugar leading to hypoglycemia. Please call office to report to  any large sugar abnormalities including hyperglycemia > 250 and hypoglycemia < 60. Please continue to wear CGM even if not using pump to monitor for blood sugar fluctuations. Please make sure glucose tablets or sugary beverages available in case of hypoglycemia. Please make sure ketone strips are available  to you (may buy over the counter from pharmacy) and monitor urine ketones every 12 hours; if you have two readings og BG > 250 mg/dl; or if signs/symptoms concerning for DKA present including abdominal pain, nausea, vomiting, confusion, trouble breathing, dry mouth, fruity breath or breath that smells like nail polish remover, and excessive thirst/urination. If your urine ketone strips shows trace to small ketones, drink plenty of water and give yourself a correction dose. If BG is less than 250 mg/dL and you do not have moderate/large ketones, you may also exercise to help lower glucose levels. If you have moderate/large ketones, please give correction dosage, drink water, and immediately contact [facility] if during normal business hours. If outside of normal business hours, please give correction dosage, drink water, and report to nearest ED for evaluation/management of DKA.     When the insulin pump is restarted, do not restart basal rates until at least 22 hours after the last long acting insulin injection. You can set a 0% temporary basal setting that will last until this time and still use your pump to bolus for meals and correction. For help with setting temporary basal setting, please call office, view your pump's online resource library, or call 24 hour technical support hotline.     For any technical insulin pump issues, please contact the insulin pump company; the toll free number is printed on the label on the back of the insulin pump.

## 2023-11-16 ENCOUNTER — LAB VISIT (OUTPATIENT)
Dept: LAB | Facility: HOSPITAL | Age: 43
End: 2023-11-16
Attending: FAMILY MEDICINE
Payer: COMMERCIAL

## 2023-11-16 DIAGNOSIS — R79.89 ELEVATED LFTS: ICD-10-CM

## 2023-11-16 DIAGNOSIS — R74.8 ELEVATED LIVER ENZYMES: ICD-10-CM

## 2023-11-16 LAB
ALBUMIN SERPL BCP-MCNC: 3.7 G/DL (ref 3.5–5.2)
ALP SERPL-CCNC: 73 U/L (ref 55–135)
ALT SERPL W/O P-5'-P-CCNC: 29 U/L (ref 10–44)
ANION GAP SERPL CALC-SCNC: 7 MMOL/L (ref 8–16)
AST SERPL-CCNC: 23 U/L (ref 10–40)
BILIRUB SERPL-MCNC: 0.5 MG/DL (ref 0.1–1)
BUN SERPL-MCNC: 17 MG/DL (ref 6–20)
CALCIUM SERPL-MCNC: 9.3 MG/DL (ref 8.7–10.5)
CHLORIDE SERPL-SCNC: 105 MMOL/L (ref 95–110)
CO2 SERPL-SCNC: 28 MMOL/L (ref 23–29)
CREAT SERPL-MCNC: 1.3 MG/DL (ref 0.5–1.4)
EST. GFR  (NO RACE VARIABLE): >60 ML/MIN/1.73 M^2
GLUCOSE SERPL-MCNC: 98 MG/DL (ref 70–110)
HBV SURFACE AB SER-ACNC: <3 MIU/ML
HBV SURFACE AB SER-ACNC: NORMAL M[IU]/ML
HBV SURFACE AG SERPL QL IA: NORMAL
HCV AB SERPL QL IA: NORMAL
POTASSIUM SERPL-SCNC: 4.5 MMOL/L (ref 3.5–5.1)
PROT SERPL-MCNC: 6.8 G/DL (ref 6–8.4)
SODIUM SERPL-SCNC: 140 MMOL/L (ref 136–145)

## 2023-11-16 PROCEDURE — 86803 HEPATITIS C AB TEST: CPT | Performed by: FAMILY MEDICINE

## 2023-11-16 PROCEDURE — 86038 ANTINUCLEAR ANTIBODIES: CPT | Performed by: FAMILY MEDICINE

## 2023-11-16 PROCEDURE — 87340 HEPATITIS B SURFACE AG IA: CPT | Performed by: FAMILY MEDICINE

## 2023-11-16 PROCEDURE — 80053 COMPREHEN METABOLIC PANEL: CPT | Performed by: PHYSICIAN ASSISTANT

## 2023-11-16 PROCEDURE — 86706 HEP B SURFACE ANTIBODY: CPT | Performed by: FAMILY MEDICINE

## 2023-11-16 PROCEDURE — 36415 COLL VENOUS BLD VENIPUNCTURE: CPT | Mod: PO | Performed by: FAMILY MEDICINE

## 2023-11-17 LAB — ANA SER QL IF: NORMAL

## 2023-11-25 RX ORDER — ATORVASTATIN CALCIUM 10 MG/1
10 TABLET, FILM COATED ORAL
Qty: 90 TABLET | Refills: 0 | Status: SHIPPED | OUTPATIENT
Start: 2023-11-25 | End: 2024-03-11

## 2023-11-25 NOTE — TELEPHONE ENCOUNTER
Care Due:                  Date            Visit Type   Department     Provider  --------------------------------------------------------------------------------                                MYCHART                              ANNUAL                              CHECKUP/PHY  [facility]  Last Visit: 11-      [facility]       Lena Go  Next Visit: None Scheduled  None         None Found                                                            Last  Test          Frequency    Reason                     Performed    Due Date  --------------------------------------------------------------------------------    Office Visit  15 months..  atorvastatin, insulin,     11- 02-                             [id number]...............    [employer] Embedded Care Due Messages. Reference number: [id number].   11/25/2023 3:47:03 AM CST

## 2023-11-26 RX ORDER — LISINOPRIL 5 MG/1
5 TABLET ORAL
Qty: 90 TABLET | Refills: 1 | Status: SHIPPED | OUTPATIENT
Start: 2023-11-26

## 2023-11-26 NOTE — TELEPHONE ENCOUNTER
Refill Routing Note   Medication(s) are not appropriate for processing by [facility] for the following reason(s):        No active prescription written by provider    [facility] action(s):  Defer  Approve               Appointments  past 12m or future 3m with PCP    Date Provider   Last Visit   11/18/2022 Lena Go MD   Next Visit   Visit date not found Lena Go MD   ED visits in past 90 days: 0        Note composed:8:17 PM 11/25/2023

## 2023-11-28 DIAGNOSIS — E10.65 TYPE 1 DIABETES MELLITUS WITH HYPERGLYCEMIA, WITH LONG-TERM CURRENT USE OF INSULIN: ICD-10-CM

## 2023-11-28 RX ORDER — INSULIN LISPRO 100 [IU]/ML
INJECTION, SOLUTION INTRAVENOUS; SUBCUTANEOUS
Qty: 30 ML | Refills: 3 | Status: SHIPPED | OUTPATIENT
Start: 2023-11-28 | End: 2024-01-19 | Stop reason: CLARIF

## 2023-12-04 ENCOUNTER — PATIENT MESSAGE (OUTPATIENT)
Dept: DIABETES | Facility: CLINIC | Age: 43
End: 2023-12-04
Payer: COMMERCIAL

## 2024-01-14 ENCOUNTER — PATIENT MESSAGE (OUTPATIENT)
Dept: DIABETES | Facility: CLINIC | Age: 44
End: 2024-01-14
Payer: COMMERCIAL

## 2024-01-14 DIAGNOSIS — E10.65 TYPE 1 DIABETES MELLITUS WITH HYPERGLYCEMIA, WITH LONG-TERM CURRENT USE OF INSULIN: Primary | ICD-10-CM

## 2024-01-16 ENCOUNTER — TELEPHONE (OUTPATIENT)
Dept: DIABETES | Facility: CLINIC | Age: 44
End: 2024-01-16
Payer: COMMERCIAL

## 2024-01-19 RX ORDER — INSULIN ASPART 100 [IU]/ML
INJECTION, SOLUTION INTRAVENOUS; SUBCUTANEOUS
Qty: 30 ML | Refills: 5 | Status: SHIPPED | OUTPATIENT
Start: 2024-01-19

## 2024-01-26 ENCOUNTER — PATIENT OUTREACH (OUTPATIENT)
Dept: ADMINISTRATIVE | Facility: HOSPITAL | Age: 44
End: 2024-01-26
Payer: COMMERCIAL

## 2024-01-26 NOTE — PROGRESS NOTES
Working not seen in 12 months.  Pt last seen Nov 2022.    Assisted to schedule annual exam, 3/05/24. Pt will come in fasting for same day labs.

## 2024-03-05 ENCOUNTER — OFFICE VISIT (OUTPATIENT)
Dept: FAMILY MEDICINE | Facility: CLINIC | Age: 44
End: 2024-03-05
Payer: COMMERCIAL

## 2024-03-05 ENCOUNTER — TELEPHONE (OUTPATIENT)
Dept: FAMILY MEDICINE | Facility: CLINIC | Age: 44
End: 2024-03-05

## 2024-03-05 VITALS
DIASTOLIC BLOOD PRESSURE: 80 MMHG | OXYGEN SATURATION: 96 % | HEIGHT: 75 IN | TEMPERATURE: 99 F | HEART RATE: 76 BPM | SYSTOLIC BLOOD PRESSURE: 130 MMHG | WEIGHT: 211.75 LBS | BODY MASS INDEX: 26.33 KG/M2

## 2024-03-05 DIAGNOSIS — Z00.00 PREVENTATIVE HEALTH CARE: Primary | ICD-10-CM

## 2024-03-05 DIAGNOSIS — J30.89 NON-SEASONAL ALLERGIC RHINITIS, UNSPECIFIED TRIGGER: ICD-10-CM

## 2024-03-05 DIAGNOSIS — E10.9 CONTROLLED DIABETES MELLITUS TYPE 1 WITHOUT COMPLICATIONS: ICD-10-CM

## 2024-03-05 PROCEDURE — 99999 PR PBB SHADOW E&M-EST. PATIENT-LVL IV: CPT | Mod: PBBFAC,,, | Performed by: FAMILY MEDICINE

## 2024-03-05 PROCEDURE — 3008F BODY MASS INDEX DOCD: CPT | Mod: CPTII,S$GLB,, | Performed by: FAMILY MEDICINE

## 2024-03-05 PROCEDURE — 3075F SYST BP GE 130 - 139MM HG: CPT | Mod: CPTII,S$GLB,, | Performed by: FAMILY MEDICINE

## 2024-03-05 PROCEDURE — 99396 PREV VISIT EST AGE 40-64: CPT | Mod: S$GLB,,, | Performed by: FAMILY MEDICINE

## 2024-03-05 PROCEDURE — 1159F MED LIST DOCD IN RCRD: CPT | Mod: CPTII,S$GLB,, | Performed by: FAMILY MEDICINE

## 2024-03-05 PROCEDURE — 3079F DIAST BP 80-89 MM HG: CPT | Mod: CPTII,S$GLB,, | Performed by: FAMILY MEDICINE

## 2024-03-05 NOTE — PROGRESS NOTES
CHIEF COMPLAINT:  This is a 43-year-old male here for preventive health exam.     SUBJECTIVE: Patient is doing well without complaints. He is a type I diabetic with onset at age 16.  A1c 4 months ago was 5.9%.  Patient uses a CGM and an insulin pump.  Patient denies polyuria, polydipsia, polyphagia.  His weight has increased with a BMI of 26.47.  The patient is exercising regularly and feels that some of weight gain is muscle mass.  He takes lisinopril 5 mg daily and atorvastatin 10 mg for prevention. He has allergic rhinitis and is undergoing allergy immunotherapy.      Eye exam May 2020. Tdap April 2016. Pneumovax November 1999.  Influenza vaccine November 2023. COVID 19 March, April, December 2021.     ROS:  GENERAL:  Patient denies fever, chills, night sweats.  Patient denies weight gain or loss. Patient denies anorexia, fatigue, weakness or swollen glands.  SKIN: Patient denies rash or hair loss.  HEENT:  Patient denies sore throat, ear pain, hearing loss, nasal congestion, or runny nose. Patient denies visual disturbance, eye irritation or discharge.  LUNGS: Patient denies cough, wheeze or hemoptysis.  CARDIOVASCULAR: Patient denies chest pain, shortness of breath, palpitations, syncope or lower extremity edema.  GI:  Patient denies abdominal pain, nausea, vomiting, diarrhea, constipation, blood in stool or melena.  GENITOURINARY:  Patient denies dysuria, frequency, hematuria, nocturia, urgency or incontinence.  MUSCULOSKELETAL: Patient denies joint pain, swelling, redness or warmth.  NEUROLOGIC: Patient denies headache, vertigo, paresthesias, weakness in limb, dysarthria, dysphagia or abnormality of gait.  PSYCHIATRIC: Patient denies anxiety, depression, or memory loss.     OBJECTIVE:   GENERAL:  Well-developed well-nourished male alert and oriented x3, in no acute distress.  Memory, judgment and cognition without deficit.   SKIN: Clear without rash.  Normal color and tone.  HEENT: Eyes: Clear conjunctivae.   No scleral icterus.  Pupils equal reactive to light and accommodation.  Ears: Clear canals. Clear TMs.  Nose: Without congestion.  Pharynx: Without injection or exudates.  NECK: Supple, normal range of motion.  No masses, nodes or enlarged thyroid.  No JVD.  Carotids 2+ and equal.  No bruits.  LUNGS: Clear to auscultation.  Normal respiratory effort.  CARDIOVASCULAR: Regular rhythm, normal S1, S2 without murmur, gallop or rub.  BACK: No CVA or spinal tenderness.  ABDOMEN: Normal appearance.  Active bowel sounds.  Soft, nontender without mass or organomegaly.  No rebound or guarding.  EXTREMITIES: Without cyanosis, clubbing or edema.  Distal pulses 2+ and equal.  Normal range of motion in all extremities.  No joint effusion, erythema or warmth.  NEUROLOGIC:   Cranial nerves II through XII without deficit.  Motor strength equal bilaterally.  Sensation normal to touch.  Deep tendon reflexes 2+ and equal.  Gait without abnormality.  No tremor.  Negative cerebellar signs.    FOOT EVALUATION: 10 gram monofilament exam with protective sensation intact bilaterally. Nails appropriately trimmed. No ulcers. Distal pulses palpable.    ASSESSMENT:  1. Preventative health care    2. Controlled diabetes mellitus type 1 without complications    3. Non-seasonal allergic rhinitis, unspecified trigger      PLAN:  1. Exercise regularly.  2. Age-appropriate counseling.  3. Recent lab reviewed and acceptable.  4. Patient declines pneumococcal vaccine.  5. Eye exam.    6. Follow-up in 6 months.      This note is generated with speech recognition software and is subject to transcription error and sound alike phrases that may be missed by proofreading.

## 2024-03-10 NOTE — TELEPHONE ENCOUNTER
No care due was identified.  [facility] Embedded Care Due Messages. Reference number: [id number].   3/10/2024 2:42:47 PM CDT

## 2024-03-11 RX ORDER — ATORVASTATIN CALCIUM 10 MG/1
10 TABLET, FILM COATED ORAL
Qty: 90 TABLET | Refills: 2 | Status: SHIPPED | OUTPATIENT
Start: 2024-03-11

## 2024-03-11 NOTE — TELEPHONE ENCOUNTER
Refill Decision Note   Nino Salgueroer  is requesting a refill authorization.  Brief Assessment and Rationale for Refill:  Approve     Medication Therapy Plan:        Comments:     Note composed:9:24 AM 03/11/2024

## 2024-05-01 ENCOUNTER — PATIENT MESSAGE (OUTPATIENT)
Dept: DIABETES | Facility: CLINIC | Age: 44
End: 2024-05-01
Payer: COMMERCIAL

## 2024-05-01 DIAGNOSIS — E10.65 TYPE 1 DIABETES MELLITUS WITH HYPERGLYCEMIA, WITH LONG-TERM CURRENT USE OF INSULIN: Primary | ICD-10-CM

## 2024-05-02 ENCOUNTER — LAB VISIT (OUTPATIENT)
Dept: LAB | Facility: HOSPITAL | Age: 44
End: 2024-05-02
Attending: PHYSICIAN ASSISTANT
Payer: COMMERCIAL

## 2024-05-02 DIAGNOSIS — E10.65 TYPE 1 DIABETES MELLITUS WITH HYPERGLYCEMIA, WITH LONG-TERM CURRENT USE OF INSULIN: ICD-10-CM

## 2024-05-02 LAB
ESTIMATED AVG GLUCOSE: 128 MG/DL (ref 68–131)
HBA1C MFR BLD: 6.1 % (ref 4–5.6)

## 2024-05-02 PROCEDURE — 83036 HEMOGLOBIN GLYCOSYLATED A1C: CPT | Performed by: PHYSICIAN ASSISTANT

## 2024-05-02 PROCEDURE — 36415 COLL VENOUS BLD VENIPUNCTURE: CPT | Mod: PO | Performed by: PHYSICIAN ASSISTANT

## 2024-05-08 ENCOUNTER — OFFICE VISIT (OUTPATIENT)
Dept: DIABETES | Facility: CLINIC | Age: 44
End: 2024-05-08
Payer: COMMERCIAL

## 2024-05-08 VITALS
WEIGHT: 211.63 LBS | BODY MASS INDEX: 26.45 KG/M2 | DIASTOLIC BLOOD PRESSURE: 72 MMHG | SYSTOLIC BLOOD PRESSURE: 110 MMHG | HEART RATE: 65 BPM

## 2024-05-08 DIAGNOSIS — E10.65 TYPE 1 DIABETES MELLITUS WITH HYPERGLYCEMIA, WITH LONG-TERM CURRENT USE OF INSULIN: Primary | ICD-10-CM

## 2024-05-08 DIAGNOSIS — Z96.41 INSULIN PUMP IN PLACE: ICD-10-CM

## 2024-05-08 LAB — GLUCOSE SERPL-MCNC: 146 MG/DL (ref 70–110)

## 2024-05-08 PROCEDURE — 95251 CONT GLUC MNTR ANALYSIS I&R: CPT | Mod: S$GLB,,, | Performed by: PHYSICIAN ASSISTANT

## 2024-05-08 PROCEDURE — 99214 OFFICE O/P EST MOD 30 MIN: CPT | Mod: S$GLB,,, | Performed by: PHYSICIAN ASSISTANT

## 2024-05-08 PROCEDURE — 1159F MED LIST DOCD IN RCRD: CPT | Mod: CPTII,S$GLB,, | Performed by: PHYSICIAN ASSISTANT

## 2024-05-08 PROCEDURE — 3044F HG A1C LEVEL LT 7.0%: CPT | Mod: CPTII,S$GLB,, | Performed by: PHYSICIAN ASSISTANT

## 2024-05-08 PROCEDURE — 3008F BODY MASS INDEX DOCD: CPT | Mod: CPTII,S$GLB,, | Performed by: PHYSICIAN ASSISTANT

## 2024-05-08 PROCEDURE — 3074F SYST BP LT 130 MM HG: CPT | Mod: CPTII,S$GLB,, | Performed by: PHYSICIAN ASSISTANT

## 2024-05-08 PROCEDURE — 99999 PR PBB SHADOW E&M-EST. PATIENT-LVL III: CPT | Mod: PBBFAC,,, | Performed by: PHYSICIAN ASSISTANT

## 2024-05-08 PROCEDURE — 82962 GLUCOSE BLOOD TEST: CPT | Mod: S$GLB,,, | Performed by: PHYSICIAN ASSISTANT

## 2024-05-08 PROCEDURE — 3078F DIAST BP <80 MM HG: CPT | Mod: CPTII,S$GLB,, | Performed by: PHYSICIAN ASSISTANT

## 2024-05-08 RX ORDER — GLUCAGON 3 MG/1
1 POWDER NASAL DAILY PRN
Qty: 2 EACH | Refills: 2 | Status: SHIPPED | OUTPATIENT
Start: 2024-05-08

## 2024-05-08 RX ORDER — INSULIN DETEMIR 100 [IU]/ML
20 INJECTION, SOLUTION SUBCUTANEOUS NIGHTLY
Qty: 6 ML | Refills: 11 | Status: SHIPPED | OUTPATIENT
Start: 2024-05-08 | End: 2025-05-08

## 2024-05-08 NOTE — PROGRESS NOTES
PCP: Lena Go MD    Subjective:     Chief Complaint: Diabetes    HISTORY OF PRESENT ILLNESS: 43 y.o.   male presenting for diabetes visit.   The patient's last visit with me was on 11/8/2023.  Patient has had Type I diabetes since age 15.  Pertinent to decision making is the following comorbidities:  N/A  Patient has the following Diabetes complications: without complications  He  has attended diabetes education in the past.     Patient's most recent A1c of 6.1% was completed 1 weeks ago.   Patient states since His last A1c His blood glucose levels have been both high and low throughout the day .   Patient monitors blood glucose 4 times per day and Continuously with personal CGM Dexcom.   Patient blood glucose monitoring device will be uploaded into Segment Section today.         Patient's records show Some hypoglycemia furing early am and mid day. Appropriate correction and carb bolusing.   Patient endorses the following diabetes related symptoms: None.  Patient is due today for the following diabetes-related health maintenance standards: Eye Exam. Sched in 2 weeks.   He denies recent hospital admissions or emergency room visits.   He voices having hypoglycemia.  Patient's concerns today include glycemic control.   Patient medication regimen is as below.     CURRENT DM MEDICATIONS:   CS-II Tandem Control IQ with Lispro     INSULIN PUMP SETTINGS:  Insulin pump type: Tandem with Control IQ  Insulin Type: Humalog  Basal rate:    1.350 Units/hour from 0000 hours to 0200 hours,    0.900 Units/hour from 0200 hours to 0600 hours,   0.800 Units/hour from 0600 hours to 1600 hours,    0.750 Units/hour from 1600 hours to 2200 hours,    1.200 Units/hour from 2200 hours to 2400 hours  Insulin to Carbohydrate Ratio: 1/10  Insulin Sensitivity Factor: 1/50  Glucose Target: 110 mg/dl   Active Insulin Time: 5 hours    Other Pump Considerations:   Current Glucagon Kit: yes  Current Ketone Strips: yes  Emergency Plan  "for Pump Failure:   Long Acting Insulin Dosing:   Levemir 20 units daily (based on 15% increase from total daily basal)  Short Acting Insulin Dosing:   Lispro per IC of 10 (or 1 unit for every 10 g of carbs) and ISF/CF of 50 (or 1 unit for every 40 pts above goal blood sugar of 120)    Labs Reviewed.       No results found for: "CPEPTIDE"  No results found for: "GLUTAMICACID"       //  Weight: 96 kg (211 lb 10.3 oz), Body mass index is 26.45 kg/m².  His blood sugar in clinic today is:    No components found for: "POCGLUC"      Review of Systems   Constitutional:  Negative for activity change, appetite change, chills and fever.   HENT:  Negative for dental problem, mouth sores, nosebleeds, sore throat and trouble swallowing.    Eyes:  Negative for pain and discharge.   Respiratory:  Negative for shortness of breath, wheezing and stridor.    Cardiovascular:  Negative for chest pain, palpitations and leg swelling.   Gastrointestinal:  Negative for abdominal pain, diarrhea, nausea and vomiting.   Endocrine: Negative for polydipsia, polyphagia and polyuria.   Genitourinary:  Negative for dysuria, frequency and urgency.   Musculoskeletal:  Negative for joint swelling and myalgias.   Skin:  Negative for rash and wound.   Neurological:  Negative for dizziness, syncope, weakness and headaches.   Psychiatric/Behavioral:  Negative for behavioral problems and dysphoric mood.          Diabetes Management Status  Statin: Taking  ACE/ARB: Taking    Screening or Prevention Patient's value Goal Complete/Controlled?   HgA1C Testing and Control   Lab Results   Component Value Date    HGBA1C 6.1 (H) 05/02/2024      Annually/Less than 8% Yes   Lipid profile : 11/01/2023 Annually Yes   LDL control Lab Results   Component Value Date    LDLCALC 101.2 11/01/2023    Annually/Less than 100 mg/dl  No   Nephropathy screening Lab Results   Component Value Date    MICALBCREAT Unable to calculate 11/01/2023     No results found for: "PROTEINUA" " Annually Yes   Blood pressure BP Readings from Last 1 Encounters:   05/08/24 110/72    Less than 140/90 Yes   Dilated retinal exam : 12/05/2022 Annually Yes    Foot exam   : 05/08/2024 Annually Yes     Social History     Socioeconomic History    Marital status:    Tobacco Use    Smoking status: Never    Smokeless tobacco: Never   Substance and Sexual Activity    Alcohol use: No    Drug use: No    Sexual activity: Yes     Partners: Female   Social History Narrative    The patient is  and has 3 children.  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     Social Determinants of Health     Financial Resource Strain: Low Risk  (7/8/2019)    Overall Financial Resource Strain (CARDIA)     Difficulty of Paying Living Expenses: Not very hard   Food Insecurity: No Food Insecurity (7/8/2019)    Hunger Vital Sign     Worried About Running Out of Food in the Last Year: Never true     Ran Out of Food in the Last Year: Never true   Transportation Needs: No Transportation Needs (7/8/2019)    PRAPARE - Transportation     Lack of Transportation (Medical): No     Lack of Transportation (Non-Medical): No   Physical Activity: Unknown (7/14/2020)    Exercise Vital Sign     Minutes of Exercise per Session: 20 min   Stress: No Stress Concern Present (7/14/2020)    [place] [employer] - Occupational Stress Questionnaire     Feeling of Stress : Only a little     Past Medical History:   Diagnosis Date    Diabetes type 1, controlled 1998    Hx of Bell's palsy     Non-seasonal allergic rhinitis     Tinea versicolor        Objective:      Physical Exam  Constitutional:       General: He is not in acute distress.     Appearance: He is well-developed. He is not diaphoretic.   HENT:      Head: Normocephalic and atraumatic.      Right Ear: External ear normal.      Left Ear: External ear normal.      Nose: Nose normal.   Eyes:      General:         Right eye: No discharge.         Left eye: No discharge.      Pupils: Pupils are  equal, round, and reactive to light.   Cardiovascular:      Rate and Rhythm: Normal rate and regular rhythm.      Heart sounds: Normal heart sounds.   Pulmonary:      Effort: Pulmonary effort is normal.      Breath sounds: Normal breath sounds.   Abdominal:      Palpations: Abdomen is soft.   Musculoskeletal:         General: Normal range of motion.      Cervical back: Normal range of motion and neck supple.   Skin:     General: Skin is warm and dry.      Capillary Refill: Capillary refill takes less than 2 seconds.   Neurological:      Mental Status: He is alert and oriented to person, place, and time.      Motor: No abnormal muscle tone.      Coordination: Coordination normal.   Psychiatric:         Behavior: Behavior normal.         Thought Content: Thought content normal.         Judgment: Judgment normal.           Assessment / Plan:     Type 1 diabetes mellitus with hyperglycemia, with long-term current use of insulin  -     POCT Glucose, Hand-Held Device  -     glucagon (BAQSIMI) 3 mg/actuation Spry; 1 each by Nasal route daily as needed (Severe hypoglycemia).  Dispense: 2 each; Refill: 2  -     insulin detemir U-100, Levemir, (LEVEMIR FLEXPEN) 100 unit/mL (3 mL) InPn pen; Inject 20 Units into the skin every evening.  Dispense: 6 mL; Refill: 11  -     blood sugar diagnostic Strp; To check BG 3 times daily, to use with insurance preferred meter  Dispense: 100 each; Refill: 11    Insulin pump in place      Additional Plan Details:    - POCT Glucose  - Encouraged continuation of lifestyle changes including regular exercise and limiting carbohydrates to 30-45 grams per meal threes times daily and 15 grams per snack with a limit of two daily.   - Encouraged continued monitoring of blood glucose with maintenance of 4 times daily and Continuously with personal CGM Dexcom.  - Current DM Medication Regimen: Continue CS-II Tandem Control IQ with Lispro. See settings below. Change BR as below.   - Health Maintenance  standards addressed today: Foot Exam - completed today and documented in physical exam with feedback to patient about proper diabetic foot care and findings, Eye Exam - completed outside of [facility]; will sign ROR today for records, Flu Shot - to be scheduled today within [facility], and COVID - 19 Vaccine - patient will schedule outside of [facility] .   - Updated emergency pump failure plan;  Baqsimi Rx  - Consider Tandem Mobi  - Nursing Visit: Patient is below goal range for nursing visit for age group and will not need nursing visit at this time .   - Follow up with me in 4 months with A1c prior.    CURRENT DM MEDICATIONS:   CS-II Tandem Control IQ with Lispro     INSULIN PUMP SETTINGS:  Insulin pump type: Tandem with Control IQ  Insulin Type: Humalog  Basal rate:    1.350 Units/hour from 0000 hours to 0200 hours,    0.800 Units/hour from 0200 hours to 0600 hours,   0.725 Units/hour from 0600 hours to 1600 hours,    0.750 Units/hour from 1600 hours to 2200 hours,    1.200 Units/hour from 2200 hours to 2400 hours  Insulin to Carbohydrate Ratio: 1/10  Insulin Sensitivity Factor: 1/50  Glucose Target: 110 mg/dl   Active Insulin Time: 5 hours    [facility]    A total of 30 minutes was spent in face to face time, of which over 50 % was spent in counseling patient on disease process, complications, treatment, and side effects of medications.

## 2024-05-08 NOTE — PATIENT INSTRUCTIONS
U100 Pump Failure Plan:   We have decided to develop a plan in the event that your pump breaks or is nonfunctioning. After 4 hours without pump use, you should begin to consider putting your Pump Failure Back Up Plan into action especially if you foresee that you may not be able to use your pump for more than 20 hours. Since you are currently using short acting insulin (Humalog/Novolog/Admelog/Novolin R) in your pump, you will need access to your short acting insulin vial, syringes, and a back up long acting insulin in the event of a pump failure. I have sent a prescription for a long acting insulin to your pharmacy for use during your emergency pump failure plan. It is important that you keep both types of insulin/syringes with you so that you may have access to it at least 3 times daily if needed. This medication and syringes should be brought with you on overnight trips in the case of an emergency pump failure. This means making a plan to keeping your insulin and syringes at school, work, or other places you frequent. If needed, please reach out to my office about any letters you may need for permission to keep these items for emergency purposes. We will outline your plan for pump failure emergencies below, but please remember to contact the insulin pump company (toll free number is printed on the label on the back of the insulin pump) and our office if you have a pump failure. When the insulin pump is restarted, do not restart basal rates until at least 22 hours after the last long acting insulin injection. You can set a 0% temporary basal setting that will last until this time and use your pump to bolus for meals and correction. If you need help with these feature, please call your insulin pump company tech support line or ask them in anticipation of this action.     Dosing:   If the insulin pump is non functional and discontinued for anticipated more than 20 hours, please give daily injections of:    Long  Acting Insulin Dosing:   Levemir 20 units daily (based on 15% increase from total daily basal)  Short Acting Insulin Dosing:   Lispro per IC of 10 (or 1 unit for every 10 g of carbs) and ISF/CF of 50 (or 1 unit for every 50 pts above goal blood sugar of 120)    Ex: If you are giving an injection for a meal, your dose will be 1 unit for every 10g of carbs and 1 unit for every 50pts above 120. So if you eat a 50g carb meal and your blood sugar is 250 mg/dl, you would calculate as following and round the units down for your dosing.   Meal Carbs (50) divided by your Carb Ratio (10) = 5 so you would round down to 5 units to cover your meal.   Blood Sugar (250) - Goal Blood Sugar (120) = 130 and then 130 divided by your Correction Factor (50) = 2.6 so you would round down to 2 units to correct your blood sugar.   Adding these two numbers together would give you 7 units total to take before your meal.     NOTE: This plan is based off of your average insulin needs per recent pump report. Short Acting Insulin should be taken 15 mins before eating a meal and every 2 hours as needed for correction dosing. The dose calculation above may be used for correction dosing by either only calculating the units to cover blood sugar or inputting a carb amount of 0g if not eating. Please not that taking insulin at the 2 hour krunal may mean you still have insulin on board and may lead to hypoglycemia or low blood sugars due to insulin stacking. If calculating correction dosing for the 3rd time or more time, please consider dividing in half to avoid over correction of blood sugar leading to hypoglycemia. Please call office to report to  any large sugar abnormalities including hyperglycemia > 250 and hypoglycemia < 60. Please continue to wear CGM even if not using pump to monitor for blood sugar fluctuations. Please make sure glucose tablets or sugary beverages available in case of hypoglycemia. Please make sure ketone strips are available to  you (may buy over the counter from pharmacy) and monitor urine ketones every 12 hours; if you have two readings og BG > 250 mg/dl; or if signs/symptoms concerning for DKA present including abdominal pain, nausea, vomiting, confusion, trouble breathing, dry mouth, fruity breath or breath that smells like nail polish remover, and excessive thirst/urination. If your urine ketone strips shows trace to small ketones, drink plenty of water and give yourself a correction dose. If BG is less than 250 mg/dL and you do not have moderate/large ketones, you may also exercise to help lower glucose levels. If you have moderate/large ketones, please give correction dosage, drink water, and immediately contact [facility] if during normal business hours. If outside of normal business hours, please give correction dosage, drink water, and report to nearest ED for evaluation/management of DKA.     When the insulin pump is restarted, do not restart basal rates until at least 22 hours after the last long acting insulin injection. You can set a 0% temporary basal setting that will last until this time and still use your pump to bolus for meals and correction. For help with setting temporary basal setting, please call office, view your pump's online resource library, or call 24 hour technical support hotline.     For any technical insulin pump issues, please contact the insulin pump company; the toll free number is printed on the label on the back of the insulin pump.

## 2024-05-24 ENCOUNTER — PATIENT MESSAGE (OUTPATIENT)
Dept: OPHTHALMOLOGY | Facility: CLINIC | Age: 44
End: 2024-05-24

## 2024-05-24 ENCOUNTER — OFFICE VISIT (OUTPATIENT)
Dept: OPHTHALMOLOGY | Facility: CLINIC | Age: 44
End: 2024-05-24
Payer: COMMERCIAL

## 2024-05-24 DIAGNOSIS — E10.9 CONTROLLED DIABETES MELLITUS TYPE 1 WITHOUT COMPLICATIONS: ICD-10-CM

## 2024-05-24 DIAGNOSIS — Z98.890 HX OF LASIK: ICD-10-CM

## 2024-05-24 DIAGNOSIS — E10.9 TYPE 1 DIABETES MELLITUS WITHOUT RETINOPATHY: Primary | ICD-10-CM

## 2024-05-24 PROCEDURE — 92014 COMPRE OPH EXAM EST PT 1/>: CPT | Mod: S$GLB,,, | Performed by: OPTOMETRIST

## 2024-05-24 PROCEDURE — 3044F HG A1C LEVEL LT 7.0%: CPT | Mod: CPTII,S$GLB,, | Performed by: OPTOMETRIST

## 2024-05-24 PROCEDURE — 2023F DILAT RTA XM W/O RTNOPTHY: CPT | Mod: CPTII,S$GLB,, | Performed by: OPTOMETRIST

## 2024-05-24 PROCEDURE — 99999 PR PBB SHADOW E&M-EST. PATIENT-LVL III: CPT | Mod: PBBFAC,,, | Performed by: OPTOMETRIST

## 2024-05-24 PROCEDURE — 1159F MED LIST DOCD IN RCRD: CPT | Mod: CPTII,S$GLB,, | Performed by: OPTOMETRIST

## 2024-05-24 PROCEDURE — 1160F RVW MEDS BY RX/DR IN RCRD: CPT | Mod: CPTII,S$GLB,, | Performed by: OPTOMETRIST

## 2024-05-24 NOTE — PROGRESS NOTES
HPI     Diabetic Eye Exam            Comments: Diagnosed with diabetes in 1996  Lab Results       Component                Value               Date                       HGBA1C                   6.1 (H)             05/02/2024              Vision changes since last eye exam?: yes  Any eye pain today: no  Other ocular symptoms: no  Interested in contact lens fitting today? no    LASIK OU x December 2022         Last edited by Shira Sheth MA on 5/24/2024  8:02 AM.            Assessment /Plan     For exam results, see Encounter Report.    Type 1 diabetes mellitus without retinopathy  There was no diabetic retinopathy present in either eye today.   Recommended that pt continue care with PCP and/or specialists regarding diabetes.  Follow-up dilated eye exam recommended in 12 months, sooner with any vision changes or new concerns.    Hx of LASIK      RTC 1 yr for dilated eye exam or PRN if any problems.   Discussed above and answered questions.

## 2024-07-24 ENCOUNTER — TELEPHONE (OUTPATIENT)
Dept: FAMILY MEDICINE | Facility: CLINIC | Age: 44
End: 2024-07-24
Payer: COMMERCIAL

## 2024-07-24 NOTE — TELEPHONE ENCOUNTER
----- Message from Isabella Mcclelland sent at 7/24/2024  9:29 AM CDT -----  Regarding: Needs return call  Type: Needs Medical Advice  [facility]  [facility]   Additional Information: Needs to get update medical record request please call to debbie

## 2024-08-20 ENCOUNTER — PATIENT MESSAGE (OUTPATIENT)
Dept: DIABETES | Facility: CLINIC | Age: 44
End: 2024-08-20
Payer: COMMERCIAL

## 2024-08-20 DIAGNOSIS — E10.65 TYPE 1 DIABETES MELLITUS WITH HYPERGLYCEMIA, WITH LONG-TERM CURRENT USE OF INSULIN: Primary | ICD-10-CM

## 2024-08-20 RX ORDER — INSULIN PUMP SYRINGE, 3 ML
EACH MISCELLANEOUS
Qty: 1 EACH | Refills: 0 | Status: SHIPPED | OUTPATIENT
Start: 2024-08-20 | End: 2025-08-20

## 2024-08-20 RX ORDER — LANCETS
EACH MISCELLANEOUS
Qty: 100 EACH | Refills: 11 | Status: SHIPPED | OUTPATIENT
Start: 2024-08-20

## 2024-08-20 NOTE — TELEPHONE ENCOUNTER
Insurance preferred Meter and supplies Rx today     Shell Valiente PA-C, BC-ADM  [facility]

## 2024-08-27 ENCOUNTER — PATIENT MESSAGE (OUTPATIENT)
Dept: FAMILY MEDICINE | Facility: CLINIC | Age: 44
End: 2024-08-27
Payer: COMMERCIAL

## 2024-09-09 ENCOUNTER — OFFICE VISIT (OUTPATIENT)
Dept: DIABETES | Facility: CLINIC | Age: 44
End: 2024-09-09
Payer: COMMERCIAL

## 2024-09-09 VITALS
DIASTOLIC BLOOD PRESSURE: 80 MMHG | HEIGHT: 75 IN | BODY MASS INDEX: 25.57 KG/M2 | SYSTOLIC BLOOD PRESSURE: 116 MMHG | HEART RATE: 58 BPM | WEIGHT: 205.69 LBS

## 2024-09-09 DIAGNOSIS — E10.65 TYPE 1 DIABETES MELLITUS WITH HYPERGLYCEMIA, WITH LONG-TERM CURRENT USE OF INSULIN: Primary | ICD-10-CM

## 2024-09-09 DIAGNOSIS — Z96.41 INSULIN PUMP IN PLACE: ICD-10-CM

## 2024-09-09 LAB — GLUCOSE SERPL-MCNC: 166 MG/DL (ref 70–110)

## 2024-09-09 PROCEDURE — 99999 PR PBB SHADOW E&M-EST. PATIENT-LVL IV: CPT | Mod: PBBFAC,,, | Performed by: PHYSICIAN ASSISTANT

## 2024-09-09 RX ORDER — PEN NEEDLE, DIABETIC 30 GX3/16"
1 NEEDLE, DISPOSABLE MISCELLANEOUS
Qty: 100 EACH | Refills: 11 | Status: SHIPPED | OUTPATIENT
Start: 2024-09-09 | End: 2025-09-09

## 2024-09-09 RX ORDER — INSULIN ASPART 100 [IU]/ML
INJECTION, SOLUTION INTRAVENOUS; SUBCUTANEOUS
Qty: 15 ML | Refills: 11 | Status: SHIPPED | OUTPATIENT
Start: 2024-09-09

## 2024-09-09 RX ORDER — INSULIN DEGLUDEC 100 U/ML
20 INJECTION, SOLUTION SUBCUTANEOUS DAILY
Qty: 6 ML | Refills: 11 | Status: SHIPPED | OUTPATIENT
Start: 2024-09-09 | End: 2025-09-09

## 2024-09-09 NOTE — PATIENT INSTRUCTIONS
CURRENT DM MEDICATIONS:   Tresiba 13 units daily   Lispro IC 10 CF 50 Goal 100     Ex: If you are giving an injection for a meal, your dose will be 1 unit for every 10g of carbs and 1 unit for every 50pts above 120. So if you eat a 50g carb meal and your blood sugar is 250 mg/dl, you would calculate as following and round the units down for your dosing.   Meal Carbs (50) divided by your Carb Ratio (10) = 5 so you would round down to 5 units to cover your meal. Blood Sugar (250) - Goal Blood Sugar (120) = 130 and then 130 divided by your Correction Factor (50) = 2.6 so you would round down to 2 units to cover your blood sugar. Adding these two numbers together would give you 7 units total to take before your meal.

## 2024-09-09 NOTE — PROGRESS NOTES
"PCP: Lena Go MD    Subjective:     Chief Complaint: Diabetes    HISTORY OF PRESENT ILLNESS: 43 y.o.   male presenting for diabetes visit.   The patient's last visit with me was on 5/8/2024.   Patient has had Type I diabetes since age 15.  Pertinent to decision making is the following comorbidities:  N/A  Patient has the following Diabetes complications: without complications  He  has attended diabetes education in the past.     Patient's most recent A1c of 6.1% was completed 4 months ago.   Patient states since His last A1c His blood glucose levels have been both high and low throughout the day .   Patient monitors blood glucose 4 times per day and Continuously with personal CGM Dexcom.   Patient blood glucose monitoring device will be uploaded into [employer] [facility] today.           Patient's records show some baseline hypoglycemia with appropriate correction and carb bolusing. Pt is taking a pump break secondary to alarm fatigue.   Patient endorses the following diabetes related symptoms: None.  Patient is due today for the following diabetes-related health maintenance standards: Influenza Vaccine and COVID-19 Vaccine .   He denies recent hospital admissions or emergency room visits.   He voices having hypoglycemia.  Patient's concerns today include glycemic control.   Patient medication regimen is as below.     CURRENT DM MEDICATIONS:   CS-II Tandem Control IQ with Lispro - currently off   Levemir 15 units qhs   Lispro IC 10 CF 50     Other Pump Considerations:   Current Glucagon Kit: yes  Current Ketone Strips: yes      Labs Reviewed.       No results found for: "CPEPTIDE"  No results found for: "GLUTAMICACID"       //   , There is no height or weight on file to calculate BMI.  His blood sugar in clinic today is:    No components found for: "POCGLUC"      Review of Systems   Constitutional:  Negative for activity change, appetite change, chills and fever.   HENT:  Negative for dental " "problem, mouth sores, nosebleeds, sore throat and trouble swallowing.    Eyes:  Negative for pain and discharge.   Respiratory:  Negative for shortness of breath, wheezing and stridor.    Cardiovascular:  Negative for chest pain, palpitations and leg swelling.   Gastrointestinal:  Negative for abdominal pain, diarrhea, nausea and vomiting.   Endocrine: Negative for polydipsia, polyphagia and polyuria.   Genitourinary:  Negative for dysuria, frequency and urgency.   Musculoskeletal:  Negative for joint swelling and myalgias.   Skin:  Negative for rash and wound.   Neurological:  Negative for dizziness, syncope, weakness and headaches.   Psychiatric/Behavioral:  Negative for behavioral problems and dysphoric mood.          Diabetes Management Status  Statin: Taking  ACE/ARB: Taking    Screening or Prevention Patient's value Goal Complete/Controlled?   HgA1C Testing and Control   Lab Results   Component Value Date    HGBA1C 6.1 (H) 05/02/2024      Annually/Less than 8% Yes   Lipid profile : 11/01/2023 Annually Yes   LDL control Lab Results   Component Value Date    LDLCALC 101.2 11/01/2023    Annually/Less than 100 mg/dl  No   Nephropathy screening Lab Results   Component Value Date    MICALBCREAT Unable to calculate 11/01/2023     No results found for: "PROTEINUA" Annually Yes   Blood pressure BP Readings from Last 1 Encounters:   05/08/24 110/72    Less than 140/90 Yes   Dilated retinal exam : 05/24/2024 Annually Yes    Foot exam   : 05/08/2024 Annually Yes     Social History     Socioeconomic History    Marital status:    Tobacco Use    Smoking status: Never    Smokeless tobacco: Never   Substance and Sexual Activity    Alcohol use: No    Drug use: No    Sexual activity: Yes     Partners: Female   Social History Narrative    The patient is  and has 3 children.  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     Social Determinants of Health     Financial Resource Strain: Low Risk  (7/8/2019)    Overall Financial " Resource Strain (CARDIA)     Difficulty of Paying Living Expenses: Not very hard   Food Insecurity: No Food Insecurity (7/8/2019)    Hunger Vital Sign     Worried About Running Out of Food in the Last Year: Never true     Ran Out of Food in the Last Year: Never true   Transportation Needs: No Transportation Needs (7/8/2019)    PRAPARE - Transportation     Lack of Transportation (Medical): No     Lack of Transportation (Non-Medical): No   Physical Activity: Unknown (7/14/2020)    Exercise Vital Sign     Minutes of Exercise per Session: 20 min   Stress: No Stress Concern Present (7/14/2020)    [employer] - Occupational Stress Questionnaire     Feeling of Stress : Only a little     Past Medical History:   Diagnosis Date    Diabetes type 1, controlled 1998    Hx of Bell's palsy     Non-seasonal allergic rhinitis     Tinea versicolor        Objective:      Physical Exam  Constitutional:       General: He is not in acute distress.     Appearance: He is well-developed. He is not diaphoretic.   HENT:      Head: Normocephalic and atraumatic.      Right Ear: External ear normal.      Left Ear: External ear normal.      Nose: Nose normal.   Eyes:      General:         Right eye: No discharge.         Left eye: No discharge.      Pupils: Pupils are equal, round, and reactive to light.   Cardiovascular:      Rate and Rhythm: Normal rate and regular rhythm.      Heart sounds: Normal heart sounds.   Pulmonary:      Effort: Pulmonary effort is normal.      Breath sounds: Normal breath sounds.   Abdominal:      Palpations: Abdomen is soft.   Musculoskeletal:         General: Normal range of motion.      Cervical back: Normal range of motion and neck supple.   Skin:     General: Skin is warm and dry.      Capillary Refill: Capillary refill takes less than 2 seconds.   Neurological:      Mental Status: He is alert and oriented to person, place, and time.      Motor: No abnormal muscle tone.       "Coordination: Coordination normal.   Psychiatric:         Behavior: Behavior normal.         Thought Content: Thought content normal.         Judgment: Judgment normal.           Assessment / Plan:     Type 1 diabetes mellitus with hyperglycemia, with long-term current use of insulin  -     insulin degludec (TRESIBA FLEXTOUCH U-100) 100 unit/mL (3 mL) insulin pen; Inject 20 Units into the skin once daily.  Dispense: 6 mL; Refill: 11  -     insulin aspart U-100 (NOVOLOG) 100 unit/mL (3 mL) InPn pen; Titrate up to 50 units daily as instructed.  Dispense: 15 mL; Refill: 11  -     pen needle, diabetic (BD ULTRA-FINE LENY PEN NEEDLE) 32 gauge x 5/32" Ndle; 1 each by Misc.(Non-Drug; Combo Route) route 4 (four) times daily with meals and nightly.  Dispense: 100 each; Refill: 11  -     POCT Glucose, Hand-Held Device    Insulin pump in place      Additional Plan Details:    - POCT Glucose  - Encouraged continuation of lifestyle changes including regular exercise and limiting carbohydrates to 30-45 grams per meal threes times daily and 15 grams per snack with a limit of two daily.   - Encouraged continued monitoring of blood glucose with maintenance of 4 times daily and Continuously with personal CGM Dexcom. Turned off some signal loss alarms for alarm fatigue.   - Current DM Medication Regimen: Hold CS-II Tandem Control IQ with Lispro. Change Levemir to Tresiba 13 units daily. Continue Lispro IC 10 CF 50 .   - Health Maintenance standards addressed today: Flu Shot - to be scheduled today within [facility] and COVID - 19 Vaccine - patient will schedule outside of [facility] .   - Updated emergency pump failure plan;  Baqsimi Rx  - Nursing Visit: Patient is below goal range for nursing visit for age group and will not need nursing visit at this time .   - Follow up with me in 6 weeks with A1c prior.    CURRENT DM MEDICATIONS:   Tresiba 13 units daily   Lispro IC 10 CF 50 Goal 100     Blakeney McKnight, PA-C [facility]    A total of 30 minutes was spent in face to face time, of which over 50 % was spent in counseling patient on disease process, complications, treatment, and side effects of medications.

## 2024-10-21 ENCOUNTER — PATIENT MESSAGE (OUTPATIENT)
Dept: ADMINISTRATIVE | Facility: HOSPITAL | Age: 44
End: 2024-10-21
Payer: COMMERCIAL

## 2024-10-23 ENCOUNTER — TELEPHONE (OUTPATIENT)
Dept: DIABETES | Facility: CLINIC | Age: 44
End: 2024-10-23

## 2024-10-23 ENCOUNTER — OFFICE VISIT (OUTPATIENT)
Dept: DIABETES | Facility: CLINIC | Age: 44
End: 2024-10-23
Payer: COMMERCIAL

## 2024-10-23 VITALS
BODY MASS INDEX: 26.07 KG/M2 | DIASTOLIC BLOOD PRESSURE: 83 MMHG | SYSTOLIC BLOOD PRESSURE: 132 MMHG | WEIGHT: 208.56 LBS | HEART RATE: 69 BPM

## 2024-10-23 DIAGNOSIS — E10.65 TYPE 1 DIABETES MELLITUS WITH HYPERGLYCEMIA, WITH LONG-TERM CURRENT USE OF INSULIN: Primary | ICD-10-CM

## 2024-10-23 LAB — GLUCOSE SERPL-MCNC: 69 MG/DL (ref 70–110)

## 2024-10-23 PROCEDURE — 99999 PR PBB SHADOW E&M-EST. PATIENT-LVL III: CPT | Mod: PBBFAC,,, | Performed by: PHYSICIAN ASSISTANT

## 2024-10-23 PROCEDURE — 82962 GLUCOSE BLOOD TEST: CPT | Mod: S$GLB,,, | Performed by: PHYSICIAN ASSISTANT

## 2024-10-23 RX ORDER — INSULIN PEN,REUSABLE,BT LISPRO
1 INSULIN PEN (EA) SUBCUTANEOUS ONCE
Qty: 1 EACH | Refills: 0 | Status: SHIPPED | OUTPATIENT
Start: 2024-10-23 | End: 2024-10-23

## 2024-10-23 RX ORDER — INSULIN LISPRO 100 [IU]/ML
INJECTION, SOLUTION INTRAVENOUS; SUBCUTANEOUS
Qty: 30 ML | Refills: 11 | Status: SHIPPED | OUTPATIENT
Start: 2024-10-23

## 2024-10-23 NOTE — TELEPHONE ENCOUNTER
----- Message from Shell Valiente PA-C sent at 10/23/2024 12:34 PM CDT -----  Fax Face sheet and CeQur Rx copy (just print e script) to Sandra Chacko at [phone]

## 2024-10-23 NOTE — PROGRESS NOTES
"PCP: Lena Go MD    Subjective:     Chief Complaint: Diabetes    HISTORY OF PRESENT ILLNESS: 44 y.o.   male presenting for diabetes visit.   The patient's last visit with me was on 9/9/2024.  Patient has had Type I diabetes since age 15.  Pertinent to decision making is the following comorbidities:  N/A  Patient has the following Diabetes complications: without complications  He  has attended diabetes education in the past.     Patient's most recent A1c of 6.1% was completed 5 months ago.   Patient states since His last A1c His blood glucose levels have been both high and low throughout the day .   Patient monitors blood glucose 4 times per day and Continuously with personal CGM Dexcom.   Patient blood glucose monitoring device will be uploaded into [employer] today.           Patient's records show some baseline hypoglycemia with carb bolusing and hypoglycemia secondary to overestimating correction dosing. Pt is taking a pump break secondary to alarm fatigue.   Patient endorses the following diabetes related symptoms: None.  Patient is due today for the following diabetes-related health maintenance standards: Lipid panel, Urine Microalbumin/creatinine ratio, A1c, Influenza Vaccine, and COVID-19 Vaccine .   He denies recent hospital admissions or emergency room visits.   He voices having hypoglycemia as above.  Patient's concerns today include glycemic control.   Patient medication regimen is as below.     CURRENT DM MEDICATIONS:   Tresiba 13 units daily   Lispro IC 10 CF 50 Goal 100     Other Pump Considerations:   Current Glucagon Kit: yes  Current Ketone Strips: yes      Labs Reviewed.       No results found for: "CPEPTIDE"  No results found for: "GLUTAMICACID"       //  Weight: 94.6 kg (208 lb 8.9 oz), Body mass index is 26.07 kg/m².  His blood sugar in clinic today is:    No components found for: "POCGLUC"      Review of Systems   Constitutional:  Negative for activity change, appetite " "change, chills and fever.   HENT:  Negative for dental problem, mouth sores, nosebleeds, sore throat and trouble swallowing.    Eyes:  Negative for pain and discharge.   Respiratory:  Negative for shortness of breath, wheezing and stridor.    Cardiovascular:  Negative for chest pain, palpitations and leg swelling.   Gastrointestinal:  Negative for abdominal pain, diarrhea, nausea and vomiting.   Endocrine: Negative for polydipsia, polyphagia and polyuria.   Genitourinary:  Negative for dysuria, frequency and urgency.   Musculoskeletal:  Negative for joint swelling and myalgias.   Skin:  Negative for rash and wound.   Neurological:  Negative for dizziness, syncope, weakness and headaches.   Psychiatric/Behavioral:  Negative for behavioral problems and dysphoric mood.          Diabetes Management Status  Statin: Taking  ACE/ARB: Taking    Screening or Prevention Patient's value Goal Complete/Controlled?   HgA1C Testing and Control   Lab Results   Component Value Date    HGBA1C 6.1 (H) 05/02/2024      Annually/Less than 8% Yes   Lipid profile : 11/01/2023 Annually Yes   LDL control Lab Results   Component Value Date    LDLCALC 101.2 11/01/2023    Annually/Less than 100 mg/dl  No   Nephropathy screening Lab Results   Component Value Date    MICALBCREAT Unable to calculate 11/01/2023     No results found for: "PROTEINUA" Annually Yes   Blood pressure BP Readings from Last 1 Encounters:   10/23/24 132/83    Less than 140/90 Yes   Dilated retinal exam : 05/24/2024 Annually Yes    Foot exam   : 05/08/2024 Annually Yes     Social History     Socioeconomic History    Marital status:    Tobacco Use    Smoking status: Never    Smokeless tobacco: Never   Substance and Sexual Activity    Alcohol use: No    Drug use: No    Sexual activity: Yes     Partners: Female   Social History Narrative    The patient is  and has 3 children.  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     Social Drivers of Health     Financial Resource " Strain: Low Risk  (7/8/2019)    Overall Financial Resource Strain (CARDIA)     Difficulty of Paying Living Expenses: Not very hard   Food Insecurity: No Food Insecurity (7/8/2019)    Hunger Vital Sign     Worried About Running Out of Food in the Last Year: Never true     Ran Out of Food in the Last Year: Never true   Transportation Needs: No Transportation Needs (7/8/2019)    PRAPARE - Transportation     Lack of Transportation (Medical): No     Lack of Transportation (Non-Medical): No   Physical Activity: Unknown (7/14/2020)    Exercise Vital Sign     Minutes of Exercise per Session: 20 min   Stress: No Stress Concern Present (7/14/2020)    [employer] - Occupational Stress Questionnaire     Feeling of Stress : Only a little     Past Medical History:   Diagnosis Date    Diabetes type 1, controlled 1998    Hx of Bell's palsy     Non-seasonal allergic rhinitis     Tinea versicolor        Objective:      Physical Exam  Constitutional:       General: He is not in acute distress.     Appearance: He is well-developed. He is not diaphoretic.   HENT:      Head: Normocephalic and atraumatic.      Right Ear: External ear normal.      Left Ear: External ear normal.      Nose: Nose normal.   Eyes:      General:         Right eye: No discharge.         Left eye: No discharge.      Pupils: Pupils are equal, round, and reactive to light.   Cardiovascular:      Rate and Rhythm: Normal rate and regular rhythm.      Heart sounds: Normal heart sounds.   Pulmonary:      Effort: Pulmonary effort is normal.      Breath sounds: Normal breath sounds.   Abdominal:      Palpations: Abdomen is soft.   Musculoskeletal:         General: Normal range of motion.      Cervical back: Normal range of motion and neck supple.   Skin:     General: Skin is warm and dry.      Capillary Refill: Capillary refill takes less than 2 seconds.   Neurological:      Mental Status: He is alert and oriented to person, place, and time.       Motor: No abnormal muscle tone.      Coordination: Coordination normal.   Psychiatric:         Behavior: Behavior normal.         Thought Content: Thought content normal.         Judgment: Judgment normal.         Assessment / Plan:     Type 1 diabetes mellitus with hyperglycemia, with long-term current use of insulin  -     POCT Glucose, Hand-Held Device  -     Hemoglobin A1C; Standing  -     Lipid Panel; Future; Expected date: 10/23/2024  -     Comprehensive Metabolic Panel; Future; Expected date: 10/23/2024  -     CBC Auto Differential; Future; Expected date: 10/23/2024  -     Microalbumin/Creatinine Ratio, Urine; Future; Expected date: 10/23/2024  -     TSH; Future; Expected date: 10/23/2024  -     T4, Free; Future; Expected date: 10/23/2024  -     insulin pen,reusable,BT,lispro (INPEN, FOR HUMALOG, BLUE) InPn; Inject 1 each into the skin once. for 1 dose  Dispense: 1 each; Refill: 0  -     insulin lispro (HUMALOG U-100 INSULIN) 100 unit/mL Crtg; Titrate up to 100 units daily as instructed.  Dispense: 30 mL; Refill: 11      Additional Plan Details:    - POCT Glucose  - Encouraged continuation of lifestyle changes including regular exercise and limiting carbohydrates to 30-45 grams per meal threes times daily and 15 grams per snack with a limit of two daily.   - Encouraged continued monitoring of blood glucose with maintenance of 4 times daily and Continuously with personal CGM Dexcom. Turned off some signal loss alarms for alarm fatigue.   - Current DM Medication Regimen: Hold CS-II Tandem Control IQ with Lispro. Change Tresiba 12 units daily. Continue Lispro IC 10 CF 50 .  - Inpen Rx    - Health Maintenance standards addressed today: Lipid panel to be scheduled today, Urine Microalbumin / Creatinine Ratio scheduled, A1c to be scheduled, [facility], and COVID - 19 Vaccine - patient will schedule outside of [facility] .   - Updated emergency pump failure plan;   Baqsimi Rx  - Nursing Visit: Patient is below goal range for nursing visit for age group and will not need nursing visit at this time .   - Follow up with me in 4 weeks with A1c prior.    CURRENT DM MEDICATIONS:   Tresiba 12 units daily   Lispro IC 10 CF 50 Goal 100     Blakeney McKnight, PA-C [facility]    A total of 30 minutes was spent in face to face time, of which over 50 % was spent in counseling patient on disease process, complications, treatment, and side effects of medications.

## 2024-10-23 NOTE — TELEPHONE ENCOUNTER
Faxed face sheet & RX script per provider request at 1:49 PM on 10/23/24 to fax number [phone].

## 2024-10-24 ENCOUNTER — PATIENT MESSAGE (OUTPATIENT)
Dept: DIABETES | Facility: CLINIC | Age: 44
End: 2024-10-24
Payer: COMMERCIAL

## 2024-10-24 NOTE — TELEPHONE ENCOUNTER
Tried to completed PA for Inpen-Humalog. Outcome:  Called [facility] to f/u. Inpen and Humalog insulin covered with copay $35 each. Pt notified via portal

## 2024-10-24 NOTE — TELEPHONE ENCOUNTER
PA completed in epic for Humalog Inpen.  Pt coming back as unable to locate. Portal messages sen tin request for insurance card info

## 2024-10-25 ENCOUNTER — LAB VISIT (OUTPATIENT)
Dept: LAB | Facility: HOSPITAL | Age: 44
End: 2024-10-25
Attending: FAMILY MEDICINE
Payer: COMMERCIAL

## 2024-10-25 DIAGNOSIS — E10.65 TYPE 1 DIABETES MELLITUS WITH HYPERGLYCEMIA, WITH LONG-TERM CURRENT USE OF INSULIN: ICD-10-CM

## 2024-10-25 LAB
ALBUMIN SERPL BCP-MCNC: 3.7 G/DL (ref 3.5–5.2)
ALP SERPL-CCNC: 85 U/L (ref 40–150)
ALT SERPL W/O P-5'-P-CCNC: 30 U/L (ref 10–44)
ANION GAP SERPL CALC-SCNC: 8 MMOL/L (ref 8–16)
AST SERPL-CCNC: 25 U/L (ref 10–40)
BASOPHILS # BLD AUTO: 0.03 K/UL (ref 0–0.2)
BASOPHILS NFR BLD: 0.6 % (ref 0–1.9)
BILIRUB SERPL-MCNC: 0.7 MG/DL (ref 0.1–1)
BUN SERPL-MCNC: 15 MG/DL (ref 6–20)
CALCIUM SERPL-MCNC: 8.7 MG/DL (ref 8.7–10.5)
CHLORIDE SERPL-SCNC: 104 MMOL/L (ref 95–110)
CHOLEST SERPL-MCNC: 168 MG/DL (ref 120–199)
CHOLEST/HDLC SERPL: 3.4 {RATIO} (ref 2–5)
CO2 SERPL-SCNC: 26 MMOL/L (ref 23–29)
CREAT SERPL-MCNC: 1.1 MG/DL (ref 0.5–1.4)
DIFFERENTIAL METHOD BLD: NORMAL
EOSINOPHIL # BLD AUTO: 0.1 K/UL (ref 0–0.5)
EOSINOPHIL NFR BLD: 1.2 % (ref 0–8)
ERYTHROCYTE [DISTWIDTH] IN BLOOD BY AUTOMATED COUNT: 13.2 % (ref 11.5–14.5)
EST. GFR  (NO RACE VARIABLE): >60 ML/MIN/1.73 M^2
ESTIMATED AVG GLUCOSE: 134 MG/DL (ref 68–131)
GLUCOSE SERPL-MCNC: 137 MG/DL (ref 70–110)
HBA1C MFR BLD: 6.3 % (ref 4–5.6)
HCT VFR BLD AUTO: 43.5 % (ref 40–54)
HDLC SERPL-MCNC: 49 MG/DL (ref 40–75)
HDLC SERPL: 29.2 % (ref 20–50)
HGB BLD-MCNC: 14.5 G/DL (ref 14–18)
IMM GRANULOCYTES # BLD AUTO: 0.01 K/UL (ref 0–0.04)
IMM GRANULOCYTES NFR BLD AUTO: 0.2 % (ref 0–0.5)
LDLC SERPL CALC-MCNC: 108 MG/DL (ref 63–159)
LYMPHOCYTES # BLD AUTO: 1.7 K/UL (ref 1–4.8)
LYMPHOCYTES NFR BLD: 33 % (ref 18–48)
MCH RBC QN AUTO: 29.2 PG (ref 27–31)
MCHC RBC AUTO-ENTMCNC: 33.3 G/DL (ref 32–36)
MCV RBC AUTO: 88 FL (ref 82–98)
MONOCYTES # BLD AUTO: 0.4 K/UL (ref 0.3–1)
MONOCYTES NFR BLD: 8.5 % (ref 4–15)
NEUTROPHILS # BLD AUTO: 2.9 K/UL (ref 1.8–7.7)
NEUTROPHILS NFR BLD: 56.5 % (ref 38–73)
NONHDLC SERPL-MCNC: 119 MG/DL
NRBC BLD-RTO: 0 /100 WBC
PLATELET # BLD AUTO: 211 K/UL (ref 150–450)
PMV BLD AUTO: 11.9 FL (ref 9.2–12.9)
POTASSIUM SERPL-SCNC: 4.3 MMOL/L (ref 3.5–5.1)
PROT SERPL-MCNC: 6.5 G/DL (ref 6–8.4)
RBC # BLD AUTO: 4.96 M/UL (ref 4.6–6.2)
SODIUM SERPL-SCNC: 138 MMOL/L (ref 136–145)
T4 FREE SERPL-MCNC: 0.89 NG/DL (ref 0.71–1.51)
TRIGL SERPL-MCNC: 55 MG/DL (ref 30–150)
TSH SERPL DL<=0.005 MIU/L-ACNC: 0.86 UIU/ML (ref 0.4–4)
WBC # BLD AUTO: 5.18 K/UL (ref 3.9–12.7)

## 2024-10-25 PROCEDURE — 36415 COLL VENOUS BLD VENIPUNCTURE: CPT | Mod: PO | Performed by: PHYSICIAN ASSISTANT

## 2024-10-25 PROCEDURE — 80061 LIPID PANEL: CPT | Performed by: PHYSICIAN ASSISTANT

## 2024-10-25 PROCEDURE — 84439 ASSAY OF FREE THYROXINE: CPT | Performed by: PHYSICIAN ASSISTANT

## 2024-10-25 PROCEDURE — 85025 COMPLETE CBC W/AUTO DIFF WBC: CPT | Performed by: PHYSICIAN ASSISTANT

## 2024-10-25 PROCEDURE — 80053 COMPREHEN METABOLIC PANEL: CPT | Performed by: PHYSICIAN ASSISTANT

## 2024-10-25 PROCEDURE — 84443 ASSAY THYROID STIM HORMONE: CPT | Performed by: PHYSICIAN ASSISTANT

## 2024-10-25 PROCEDURE — 83036 HEMOGLOBIN GLYCOSYLATED A1C: CPT | Performed by: PHYSICIAN ASSISTANT

## 2024-11-18 ENCOUNTER — TELEPHONE (OUTPATIENT)
Dept: FAMILY MEDICINE | Facility: CLINIC | Age: 44
End: 2024-11-18
Payer: COMMERCIAL

## 2024-11-18 NOTE — TELEPHONE ENCOUNTER
----- Message from Megan sent at 11/18/2024 10:20 AM CST -----  Contact: [employer] [place] [phone]  Calling re insulin pen script they state was faxed Friday to [phone]. [employer] wants to confirm it was received. Please call them back to confirm. Thanks

## 2024-11-18 NOTE — TELEPHONE ENCOUNTER
----- Message from Megan sent at 11/18/2024 10:20 AM CST -----  Contact: [email or web address] [place] [phone]  Calling re insulin pen script they state was faxed Friday to [phone]. [employer] wants to confirm it was received. Please call them back to confirm. Thanks

## 2024-11-26 ENCOUNTER — OFFICE VISIT (OUTPATIENT)
Dept: DIABETES | Facility: CLINIC | Age: 44
End: 2024-11-26
Payer: COMMERCIAL

## 2024-11-26 DIAGNOSIS — E10.65 TYPE 1 DIABETES MELLITUS WITH HYPERGLYCEMIA, WITH LONG-TERM CURRENT USE OF INSULIN: Primary | ICD-10-CM

## 2024-11-26 NOTE — PROGRESS NOTES
PCP: Lena Go MD    Subjective:     Chief Complaint: Diabetes    TELEMEDICINE VISIT:     The patient location is: Home  The chief complaint leading to consultation is: Diabetes Follow up  Visit type: Virtual visit with synchronous audio and video  Total time spent with patient: 20  Each patient to whom he or she provides medical services by telemedicine is:  (1) informed of the relationship between the physician and patient and the respective role of any other health care provider with respect to management of the patient; and (2) notified that he or she may decline to receive medical services by telemedicine and may withdraw from such care at any time.    Notes: N/A      HISTORY OF PRESENT ILLNESS: 44 y.o.   male presenting for diabetes visit.   The patient's last visit with me was on 10/23/2024.  Patient has had Type I diabetes since age 15.  Pertinent to decision making is the following comorbidities:  N/A  Patient has the following Diabetes complications: without complications  He  has attended diabetes education in the past.     Patient's most recent A1c of 6.3% was completed 1 months ago.   Patient states since His last A1c His blood glucose levels have been both high and low throughout the day .   Patient monitors blood glucose 4 times per day and Continuously with personal CGM Dexcom.   Patient blood glucose monitoring device will be uploaded into Media Section today.           Patient's records show some baseline hyperglycemia overnight which may be related to post dinner hypoglycemia or near hypoglycemia. Some overcorrection with carb bolusing. Pt is taking a pump break secondary to alarm fatigue.   Patient endorses the following diabetes related symptoms: None.  Patient is due today for the following diabetes-related health maintenance standards: Influenza Vaccine and COVID-19 Vaccine .   He denies recent hospital admissions or emergency room visits.   He voices having hypoglycemia as  "above.  Patient's concerns today include glycemic control. Of note, pt has inpen but needs it to be set up.   Patient medication regimen is as below.     CURRENT DM MEDICATIONS:   Tresiba 12 units daily   Lispro IC 10 CF 50 Goal 100     Other Pump Considerations:   Current Glucagon Kit: yes  Current Ketone Strips: yes      Labs Reviewed.       No results found for: "CPEPTIDE"  No results found for: "GLUTAMICACID"       //   , There is no height or weight on file to calculate BMI.  His blood sugar in clinic today is:    No components found for: "POCGLUC"      Review of Systems   Constitutional:  Negative for activity change, appetite change, chills and fever.   HENT:  Negative for dental problem, mouth sores, nosebleeds, sore throat and trouble swallowing.    Eyes:  Negative for pain and discharge.   Respiratory:  Negative for shortness of breath, wheezing and stridor.    Cardiovascular:  Negative for chest pain, palpitations and leg swelling.   Gastrointestinal:  Negative for abdominal pain, diarrhea, nausea and vomiting.   Endocrine: Negative for polydipsia, polyphagia and polyuria.   Genitourinary:  Negative for dysuria, frequency and urgency.   Musculoskeletal:  Negative for joint swelling and myalgias.   Skin:  Negative for rash and wound.   Neurological:  Negative for dizziness, syncope, weakness and headaches.   Psychiatric/Behavioral:  Negative for behavioral problems and dysphoric mood.          Diabetes Management Status  Statin: Taking  ACE/ARB: Taking    Screening or Prevention Patient's value Goal Complete/Controlled?   HgA1C Testing and Control   Lab Results   Component Value Date    HGBA1C 6.3 (H) 10/25/2024      Annually/Less than 8% Yes   Lipid profile : 10/25/2024 Annually Yes   LDL control Lab Results   Component Value Date    LDLCALC 108.0 10/25/2024    Annually/Less than 100 mg/dl  No   Nephropathy screening Lab Results   Component Value Date    MICALBCREAT Unable to calculate 10/25/2024     No " "results found for: "PROTEINUA" Annually Yes   Blood pressure BP Readings from Last 1 Encounters:   10/23/24 132/83    Less than 140/90 Yes   Dilated retinal exam : 05/24/2024 Annually Yes    Foot exam   : 05/08/2024 Annually Yes     Social History     Socioeconomic History    Marital status:    Tobacco Use    Smoking status: Never    Smokeless tobacco: Never   Substance and Sexual Activity    Alcohol use: No    Drug use: No    Sexual activity: Yes     Partners: Female   Social History Narrative    The patient is  and has 3 children.  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     Social Drivers of Health     Financial Resource Strain: Low Risk  (7/8/2019)    Overall Financial Resource Strain (CARDIA)     Difficulty of Paying Living Expenses: Not very hard   Food Insecurity: No Food Insecurity (7/8/2019)    Hunger Vital Sign     Worried About Running Out of Food in the Last Year: Never true     Ran Out of Food in the Last Year: Never true   Transportation Needs: No Transportation Needs (7/8/2019)    PRAPARE - Transportation     Lack of Transportation (Medical): No     Lack of Transportation (Non-Medical): No   Physical Activity: Unknown (7/14/2020)    Exercise Vital Sign     Minutes of Exercise per Session: 20 min   Stress: No Stress Concern Present (7/14/2020)    [employer] [place] [employer] [place] [employer] - Occupational Stress Questionnaire     Feeling of Stress : Only a little     Past Medical History:   Diagnosis Date    Diabetes type 1, controlled 1998    Hx of Bell's palsy     Non-seasonal allergic rhinitis     Tinea versicolor        Objective:      Physical Exam  Constitutional:       General: He is not in acute distress.     Appearance: He is well-developed. He is not diaphoretic.   HENT:      Head: Normocephalic and atraumatic.      Right Ear: External ear normal.      Left Ear: External ear normal.      Nose: Nose normal.   Eyes:      General:         Right eye: No discharge.         Left eye: No " discharge.   Pulmonary:      Effort: Pulmonary effort is normal. No respiratory distress.      Breath sounds: No stridor.   Musculoskeletal:         General: Normal range of motion.      Cervical back: Normal range of motion.   Skin:     Coloration: Skin is not pale.   Neurological:      Mental Status: He is alert and oriented to person, place, and time.      Motor: No abnormal muscle tone.      Coordination: Coordination normal.   Psychiatric:         Behavior: Behavior normal.         Thought Content: Thought content normal.         Judgment: Judgment normal.           Assessment / Plan:     Type 1 diabetes mellitus with hyperglycemia, with long-term current use of insulin      Additional Plan Details:    - POCT Glucose  - Encouraged continuation of lifestyle changes including regular exercise and limiting carbohydrates to 30-45 grams per meal threes times daily and 15 grams per snack with a limit of two daily.   - Encouraged continued monitoring of blood glucose with maintenance of 4 times daily and Continuously with personal CGM Dexcom. Turned off some signal loss alarms for alarm fatigue.   - Current DM Medication Regimen: Hold CS-II Tandem Control IQ with Lispro. Continue Tresiba 12 units daily. Change Lispro IC 15 CF 50 . Set up Inpen.   - Inpen training this Friday - settings below.   - Health Maintenance standards addressed today: [facility] and COVID - 19 Vaccine - patient will schedule outside of [facility] .   - Updated emergency pump failure plan;  Baqsimi Rx  - Nursing Visit: Patient is below goal range for nursing visit for age group and will not need nursing visit at this time .   - Follow up with me in 4 weeks with A1c prior.    INPEN SETTINGS:    Tresiba 12 units daily   Lispro IC 15 CF 50 Goal 100     Blakeney McKnight, PA-C [facility]

## 2024-11-27 DIAGNOSIS — E10.9 CONTROLLED DIABETES MELLITUS TYPE 1 WITHOUT COMPLICATIONS: Primary | ICD-10-CM

## 2024-11-29 ENCOUNTER — CLINICAL SUPPORT (OUTPATIENT)
Dept: DIABETES | Facility: CLINIC | Age: 44
End: 2024-11-29
Payer: COMMERCIAL

## 2024-11-29 DIAGNOSIS — E10.9 CONTROLLED DIABETES MELLITUS TYPE 1 WITHOUT COMPLICATIONS: ICD-10-CM

## 2024-11-29 PROCEDURE — 99999 PR PBB SHADOW E&M-EST. PATIENT-LVL III: CPT | Mod: PBBFAC,,,

## 2024-11-29 NOTE — PROGRESS NOTES
Diabetes Care Specialist Progress Note  Author: Zayda Velásquez RN  Date: 11/29/2024    Intake    Program Intake  Reason for Diabetes Program Visit:: Intervention  Type of Intervention:: Individual  Individual: Device Training  Device Training: Other (InPen)  Current diabetes risk level:: low  In the last month, have you used the ER or been admitted to the hospital: No  Permission to speak with others about care:: no    Current Diabetes Treatment: Insulin  Method of insulin delivery?: Injections  Injection Type: Pens  Pen Type/Dose: Tresiba and Fiasp.    Continuous Glucose Monitoring  Patient has CGM: Yes  Personal CGM type:: Dexcom G7.  GMI Date: 11/29/24  GMI Value: 6.9 %        Lab Results   Component Value Date    HGBA1C 6.3 (H) 10/25/2024       Lifestyle Coping Support & Clinical    Lifestyle/Coping/Support  Does anyone in your family have diabetes or does anyone in your family support you in your diabetes care?: Half brother has Type 1 DM. Wife is supportive.  List anything about Diabetes that causes you stress?: Burned out with alarms, insulin pump, etc.  How do you deal with stress/distress?: Pump break.  Learning Barriers:: None  Culture or Anabaptism beliefs that may impact ability to access healthcare: No  Psychosocial/Coping Skills Assessment Completed: : Yes  Assessment indicates:: Adequate understanding  Area of need?: No    Diabetes Self-Management Skills Assessment    Medication Skills Assessment  Patient is able to identify current diabetes medications, dosages, and appropriate timing of medications.: yes  Patient reports problems or concerns with current medication regimen.: yes  Medication regimen problems/concerns:: lack of training, unsure of doses  Patient is  aware that some diabetes medications can cause low blood sugar?: Yes  Medication Skills Assessment Completed:: Yes  Assessment indicates:: Knowledge deficit, Instruction Needed  Area of need?: Yes    Diabetes Disease Process/Treatment  Options  Diabetes Disease Process/Treatment Options: Skills Assessment Completed: No  Deferred due to:: Time  Area of need?: Deferred    Nutrition/Healthy Eating  Patient can identify foods that impact blood sugar.: yes  Nutrition/Healthy Eating Skills Assessment Completed:: Yes  Assessment indicates:: Instruction Needed, Knowledge deficit  Area of need?: Yes    Physical Activity/Exercise  Patient's daily activity level:: constantly moving  Patient formally exercises outside of work.: yes  Frequency: four or more times a week  Patient can identify forms of physical activity.: yes  Physical Activity/Exercise Skills Assessment Completed: : Yes  Assessment indicates:: Adequate understanding  Area of need?: No    Home Blood Glucose Monitoring  Patient states that blood sugar is checked at home daily.: yes  Monitoring Method:: personal continuous glucose monitor  Personal CGM type:: Dexcom G7.   What is your current Time in Range?: 69%  What is your A1c Target?: <7%  Home Blood Glucose Monitoring Skills Assessment Completed: : Yes  Assessment indicates:: Adequate understanding  Area of need?: No    Acute Complications  Acute Complications Skills Assessment Completed: : No  Deffered due to:: Time  Area of need?: Deferred    Chronic Complications  Chronic Complications Skills Assessment Completed: : No  Deferred due to:: Time  Area of need?: Deferred      Assessment Summary and Plan    Based on today's diabetes care assessment, the following areas of need were identified:      Identified Areas of Need      Medication/Current Diabetes Treatment: Yes-See care plan.     Lifestyle Coping/Support: No.     Diabetes Disease Process/Treatment Options: Deferred.     Nutrition/Healthy Eating: Yes-See care plan.     Physical Activity/Exercise: No-Goes to the gym daily.      Home Blood Glucose Monitoring: No-No issues with Dexcom.      Acute Complications: Deferred.      Chronic Complications: Deferred.          Today's interventions  were provided through individual discussion, instruction, and written materials were provided.      Patient verbalized understanding of instruction and written materials.  Pt was able to return back demonstration of instructions today. Patient understood key points, needs reinforcement and further instruction.     Diabetes Self-Management Care Plan:    Today's Diabetes Self-Management Care Plan was developed with Nino's input. Nino has agreed to work toward the following goal(s) to improve his/her overall diabetes control.      Care Plan: Diabetes Management   Updates made since 11/30/2023 12:00 AM        Problem: Medications         Goal: Patient agrees to take Humalog as prescribed using InPen.    Start Date: 11/29/2024   Expected End Date: 11/27/2025   Note:    Inpen training provided per manufacture protocol. Pt able to set-up InPen, including rewind, cartridge insertion, priming. Reviewed InPen and zenaida features, including bolus calculator, adding daily basal insulin into logbook, sending clinic reports. Reviewed how to access InPen support via phone, website and email.       Settings:  IC: 1:15 grams  ISF: 1:50 mg/dL  GBS: 100 mg/dL  IOB: 4 hours  Max dose: 20 units    Alarm set for Tresiba 12 units.          Task: Reviewed with patient all current diabetes medications and provided basic review of the purpose, dosage, frequency, side effects, and storage of both oral and injectable diabetes medications. Completed 11/29/2024        Task: Discussed guidelines for preventing, detecting and treating hypoglycemia and hyperglycemia and reviewed the importance of meal and medication timing with diabetes mediations for prevention of hypoglycemia and maximum drug benefit. Completed 11/29/2024        Problem: Healthy Eating         Goal: Patient agrees to count carbohydrates for more accurate dosing with Inpen.    Start Date: 11/29/2024   Expected End Date: 11/27/2025   Note:    Educated on carb counting and provided  with carb counting list.   Encouraged use of measuring cups.        Task: Explained how to count carbohydrates using the food label and the use of dry measuring cups for accurate carb counting. Completed 11/29/2024        Task: Discussed strategies for choosing healthier menu options when dining out. Completed 11/29/2024        Task: Recommended replacing beverages containing high sugar content with noncaloric/sugar free options and/or water. Completed 11/29/2024        Task: Review the importance of balancing carbohydrates with each meal using portion control techniques to count servings of carbohydrate and label reading to identify serving size and amount of total carbs per serving. Completed 11/29/2024          Follow Up Plan     Follow up if symptoms worsen or fail to improve.    Today's care plan and follow up schedule was discussed with patient.  Inno verbalized understanding of the care plan, goals, and agrees to follow up plan.        The patient was encouraged to communicate with his/her health care provider/physician and care team regarding his/her condition(s) and treatment.  I provided the patient with my contact information today and encouraged to contact me via phone or [facility] as needed.     Length of Visit   Total Time: 60 Minutes

## 2024-12-12 ENCOUNTER — TELEPHONE (OUTPATIENT)
Dept: DIABETES | Facility: CLINIC | Age: 44
End: 2024-12-12
Payer: COMMERCIAL

## 2024-12-12 NOTE — TELEPHONE ENCOUNTER
Patient called regarding incoming fax request for medical requested from [facility] there was no answer voice mail left

## 2024-12-17 ENCOUNTER — OFFICE VISIT (OUTPATIENT)
Dept: DIABETES | Facility: CLINIC | Age: 44
End: 2024-12-17
Payer: COMMERCIAL

## 2024-12-17 DIAGNOSIS — E10.65 TYPE 1 DIABETES MELLITUS WITH HYPERGLYCEMIA, WITH LONG-TERM CURRENT USE OF INSULIN: Primary | ICD-10-CM

## 2024-12-17 PROCEDURE — 3061F NEG MICROALBUMINURIA REV: CPT | Mod: CPTII,95,, | Performed by: PHYSICIAN ASSISTANT

## 2024-12-17 PROCEDURE — 99213 OFFICE O/P EST LOW 20 MIN: CPT | Mod: 95,,, | Performed by: PHYSICIAN ASSISTANT

## 2024-12-17 PROCEDURE — 3044F HG A1C LEVEL LT 7.0%: CPT | Mod: CPTII,95,, | Performed by: PHYSICIAN ASSISTANT

## 2024-12-17 PROCEDURE — 95251 CONT GLUC MNTR ANALYSIS I&R: CPT | Mod: NDTC,,, | Performed by: PHYSICIAN ASSISTANT

## 2024-12-17 PROCEDURE — 3066F NEPHROPATHY DOC TX: CPT | Mod: CPTII,95,, | Performed by: PHYSICIAN ASSISTANT

## 2024-12-17 PROCEDURE — G2211 COMPLEX E/M VISIT ADD ON: HCPCS | Mod: 95,,, | Performed by: PHYSICIAN ASSISTANT

## 2024-12-17 NOTE — Clinical Note
"8 wk virutal "Dex sharing + Inpen - separate dowload"   @[place], can you help him get inpen sharing set up when you can"

## 2024-12-17 NOTE — PROGRESS NOTES
PCP: Lena Go MD    Subjective:     Chief Complaint: Diabetes    TELEMEDICINE VISIT:     The patient location is: Home  The chief complaint leading to consultation is: Diabetes Follow up  Visit type: Virtual visit with synchronous audio and video  Total time spent with patient: 20  Each patient to whom he or she provides medical services by telemedicine is:  (1) informed of the relationship between the physician and patient and the respective role of any other health care provider with respect to management of the patient; and (2) notified that he or she may decline to receive medical services by telemedicine and may withdraw from such care at any time.    Notes: N/A      HISTORY OF PRESENT ILLNESS: 44 y.o.   male presenting for diabetes visit.   The patient's last visit with me was on 10/23/2024.  Patient has had Type I diabetes since age 15.  Pertinent to decision making is the following comorbidities:  N/A  Patient has the following Diabetes complications: without complications  He  has attended diabetes education in the past.     Patient's most recent A1c of 6.3% was completed 2 months ago.   Patient states since His last A1c His blood glucose levels have been both high and low throughout the day .   Patient monitors blood glucose 4 times per day and Continuously with personal CGM Dexcom.   Patient blood glucose monitoring device will be uploaded into Media Section today.           Patient's records show some baseline hyperglycemia overnight which may be related to post dinner hypoglycemia or near hypoglycemia. Some overcorrection with carb bolusing. Pt is taking a pump break secondary to alarm fatigue.   Patient endorses the following diabetes related symptoms: None.  Patient is due today for the following diabetes-related health maintenance standards: Influenza Vaccine and COVID-19 Vaccine .   He denies recent hospital admissions or emergency room visits.   He voices having hypoglycemia as  "above.  Patient's concerns today include glycemic control. Of note, pt has inpen but needs it to be set up.   Patient medication regimen is as below.     CURRENT DM MEDICATIONS:   Tresiba 12 units daily   Lispro IC 10 CF 50 Goal 100     Other Pump Considerations:   Current Glucagon Kit: yes  Current Ketone Strips: yes      Labs Reviewed.       No results found for: "CPEPTIDE"  No results found for: "GLUTAMICACID"       //   , There is no height or weight on file to calculate BMI.  His blood sugar in clinic today is:    No components found for: "POCGLUC"      Review of Systems   Constitutional:  Negative for activity change, appetite change, chills and fever.   HENT:  Negative for dental problem, mouth sores, nosebleeds, sore throat and trouble swallowing.    Eyes:  Negative for pain and discharge.   Respiratory:  Negative for shortness of breath, wheezing and stridor.    Cardiovascular:  Negative for chest pain, palpitations and leg swelling.   Gastrointestinal:  Negative for abdominal pain, diarrhea, nausea and vomiting.   Endocrine: Negative for polydipsia, polyphagia and polyuria.   Genitourinary:  Negative for dysuria, frequency and urgency.   Musculoskeletal:  Negative for joint swelling and myalgias.   Skin:  Negative for rash and wound.   Neurological:  Negative for dizziness, syncope, weakness and headaches.   Psychiatric/Behavioral:  Negative for behavioral problems and dysphoric mood.          Diabetes Management Status  Statin: Taking  ACE/ARB: Taking    Screening or Prevention Patient's value Goal Complete/Controlled?   HgA1C Testing and Control   Lab Results   Component Value Date    HGBA1C 6.3 (H) 10/25/2024      Annually/Less than 8% Yes   Lipid profile : 10/25/2024 Annually Yes   LDL control Lab Results   Component Value Date    LDLCALC 108.0 10/25/2024    Annually/Less than 100 mg/dl  No   Nephropathy screening Lab Results   Component Value Date    MICALBCREAT Unable to calculate 10/25/2024     No " "results found for: "PROTEINUA" Annually Yes   Blood pressure BP Readings from Last 1 Encounters:   10/23/24 132/83    Less than 140/90 Yes   Dilated retinal exam : 05/24/2024 Annually Yes    Foot exam   : 05/08/2024 Annually Yes     Social History     Socioeconomic History    Marital status:    Tobacco Use    Smoking status: Never    Smokeless tobacco: Never   Substance and Sexual Activity    Alcohol use: No    Drug use: No    Sexual activity: Yes     Partners: Female   Social History Narrative    The patient is  and has 3 children.  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     Social Drivers of Health     Financial Resource Strain: Low Risk  (7/8/2019)    Overall Financial Resource Strain (CARDIA)     Difficulty of Paying Living Expenses: Not very hard   Food Insecurity: No Food Insecurity (7/8/2019)    Hunger Vital Sign     Worried About Running Out of Food in the Last Year: Never true     Ran Out of Food in the Last Year: Never true   Transportation Needs: No Transportation Needs (7/8/2019)    PRAPARE - Transportation     Lack of Transportation (Medical): No     Lack of Transportation (Non-Medical): No   Physical Activity: Unknown (7/14/2020)    Exercise Vital Sign     Minutes of Exercise per Session: 20 min   Stress: No Stress Concern Present (7/14/2020)    [place] [employer] - Occupational Stress Questionnaire     Feeling of Stress : Only a little     Past Medical History:   Diagnosis Date    Diabetes type 1, controlled 1998    Hx of Bell's palsy     Non-seasonal allergic rhinitis     Tinea versicolor        Objective:      Physical Exam  Constitutional:       General: He is not in acute distress.     Appearance: He is well-developed. He is not diaphoretic.   HENT:      Head: Normocephalic and atraumatic.      Right Ear: External ear normal.      Left Ear: External ear normal.      Nose: Nose normal.   Eyes:      General:         Right eye: No discharge.         Left eye: No " discharge.   Pulmonary:      Effort: Pulmonary effort is normal. No respiratory distress.      Breath sounds: No stridor.   Musculoskeletal:         General: Normal range of motion.      Cervical back: Normal range of motion.   Skin:     Coloration: Skin is not pale.   Neurological:      Mental Status: He is alert and oriented to person, place, and time.      Motor: No abnormal muscle tone.      Coordination: Coordination normal.   Psychiatric:         Behavior: Behavior normal.         Thought Content: Thought content normal.         Judgment: Judgment normal.         Assessment / Plan:     Type 1 diabetes mellitus with hyperglycemia, with long-term current use of insulin      Additional Plan Details:    - POCT Glucose  - Encouraged continuation of lifestyle changes including regular exercise and limiting carbohydrates to 30-45 grams per meal threes times daily and 15 grams per snack with a limit of two daily.   - Encouraged continued monitoring of blood glucose with maintenance of 4 times daily and Continuously with personal CGM Dexcom.   - Current DM Medication Regimen: Hold CS-II Tandem Control IQ with Lispro. Continue Tresiba 12 units daily. Continue Inpen Lispro IC 15 CF 50 . Log doses - will plan for afrezza.   - Health Maintenance standards addressed today: Flu Shot - to be scheduled today within [facility] and COVID - 19 Vaccine - patient will schedule outside of [facility] .   - Updated emergency pump failure plan;  Baqsimi Rx  - Nursing Visit: Patient is below goal range for nursing visit for age group and will not need nursing visit at this time .   - Follow up with me in 8 weeks with A1c prior.    INPEN SETTINGS:    Tresiba 12 units daily   Lispro IC 15 CF 50 Goal 100     Blakeney McKnight, PA-C [facility]

## 2024-12-18 ENCOUNTER — TELEPHONE (OUTPATIENT)
Dept: DIABETES | Facility: CLINIC | Age: 44
End: 2024-12-18
Payer: COMMERCIAL

## 2024-12-18 NOTE — TELEPHONE ENCOUNTER
"Called Mr. Oneill and assisted with linking [employer] and InPen. Successful call.     ----- Message from Shell Valiente PA-C sent at 12/17/2024  4:11 PM CST -----  8 wk virutal "Dex sharing + Inpen - separate dowload"     @[place], can you help him get inpen sharing set up when you can  "

## 2025-02-11 ENCOUNTER — OFFICE VISIT (OUTPATIENT)
Dept: DIABETES | Facility: CLINIC | Age: 45
End: 2025-02-11
Payer: COMMERCIAL

## 2025-02-11 DIAGNOSIS — E10.65 TYPE 1 DIABETES MELLITUS WITH HYPERGLYCEMIA, WITH LONG-TERM CURRENT USE OF INSULIN: Primary | ICD-10-CM

## 2025-02-11 NOTE — PROGRESS NOTES
PCP: Lena Go MD    Subjective:     Chief Complaint: Diabetes    TELEMEDICINE VISIT:     The patient location is: Home  The chief complaint leading to consultation is: Diabetes Follow up  Visit type: Virtual visit with synchronous audio and video  Each patient to whom he or she provides medical services by telemedicine is:  (1) informed of the relationship between the physician and patient and the respective role of any other health care provider with respect to management of the patient; and (2) notified that he or she may decline to receive medical services by telemedicine and may withdraw from such care at any time.    Notes: N/A      HISTORY OF PRESENT ILLNESS: 44 y.o.   male presenting for diabetes visit.   The patient's last visit with me was on 12/17/2024.   Patient has had Type I diabetes since age 15.  Pertinent to decision making is the following comorbidities:  N/A  Patient has the following Diabetes complications: without complications  He  has attended diabetes education in the past.     Patient's most recent A1c of 6.3% was completed 4 months ago.   Patient states since His last A1c His blood glucose levels have been both high and low throughout the day .   Patient monitors blood glucose 4 times per day and Continuously with personal CGM Dexcom.   Patient blood glucose monitoring device will be uploaded into [facility] today.               Patient's records show some baseline euglycemia with appropriate coverage of bolusing with IC 10. Some overcorrection with insulin stacking.   Patient endorses the following diabetes related symptoms: None.  Patient is due today for the following diabetes-related health maintenance standards: Influenza Vaccine and COVID-19 Vaccine .   He denies recent hospital admissions or emergency room visits.   He voices having hypoglycemia as above.  Patient's concerns today include glycemic control. Of note, pt is using inpen but manual dosage amounts. Pt  "was previously using  Tandem pump break but stopped secondary to alarm fatigue. Very interested in Afrezza - would like to undergo FeV1 once available in clinic.   Patient medication regimen is as below.     CURRENT DM MEDICATIONS:   Tresiba 12 units daily   Inpen Lispro IC 15 CF 50 Goal 100     Other Pump Considerations:   Current Glucagon Kit: yes  Current Ketone Strips: yes      Labs Reviewed.       No results found for: "CPEPTIDE"  No results found for: "GLUTAMICACID"       //   , There is no height or weight on file to calculate BMI.  His blood sugar in clinic today is:    No components found for: "POCGLUC"      Review of Systems   Constitutional:  Negative for activity change, appetite change, chills and fever.   HENT:  Negative for dental problem, mouth sores, nosebleeds, sore throat and trouble swallowing.    Eyes:  Negative for pain and discharge.   Respiratory:  Negative for shortness of breath, wheezing and stridor.    Cardiovascular:  Negative for chest pain, palpitations and leg swelling.   Gastrointestinal:  Negative for abdominal pain, diarrhea, nausea and vomiting.   Endocrine: Negative for polydipsia, polyphagia and polyuria.   Genitourinary:  Negative for dysuria, frequency and urgency.   Musculoskeletal:  Negative for joint swelling and myalgias.   Skin:  Negative for rash and wound.   Neurological:  Negative for dizziness, syncope, weakness and headaches.   Psychiatric/Behavioral:  Negative for behavioral problems and dysphoric mood.          Diabetes Management Status  Statin: Taking  ACE/ARB: Taking    Screening or Prevention Patient's value Goal Complete/Controlled?   HgA1C Testing and Control   Lab Results   Component Value Date    HGBA1C 6.3 (H) 10/25/2024      Annually/Less than 8% Yes   Lipid profile : 10/25/2024 Annually Yes   LDL control Lab Results   Component Value Date    LDLCALC 108.0 10/25/2024    Annually/Less than 100 mg/dl  No   Nephropathy screening Lab Results   Component Value " "Date    MICALBCREAT Unable to calculate 10/25/2024     No results found for: "PROTEINUA" Annually Yes   Blood pressure BP Readings from Last 1 Encounters:   10/23/24 132/83    Less than 140/90 Yes   Dilated retinal exam : 05/24/2024 Annually Yes    Foot exam   : 05/08/2024 Annually Yes     Social History     Socioeconomic History    Marital status:    Tobacco Use    Smoking status: Never    Smokeless tobacco: Never   Substance and Sexual Activity    Alcohol use: No    Drug use: No    Sexual activity: Yes     Partners: Female   Social History Narrative    The patient is  and has 3 children.  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     Social Drivers of Health     Financial Resource Strain: Low Risk  (7/8/2019)    Overall Financial Resource Strain (CARDIA)     Difficulty of Paying Living Expenses: Not very hard   Food Insecurity: No Food Insecurity (7/8/2019)    Hunger Vital Sign     Worried About Running Out of Food in the Last Year: Never true     Ran Out of Food in the Last Year: Never true   Transportation Needs: No Transportation Needs (7/8/2019)    PRAPARE - Transportation     Lack of Transportation (Medical): No     Lack of Transportation (Non-Medical): No   Physical Activity: Unknown (7/14/2020)    Exercise Vital Sign     Minutes of Exercise per Session: 20 min   Stress: No Stress Concern Present (7/14/2020)    [employer] - Occupational Stress Questionnaire     Feeling of Stress : Only a little     Past Medical History:   Diagnosis Date    Diabetes type 1, controlled 1998    Hx of Bell's palsy     Non-seasonal allergic rhinitis     Tinea versicolor        Objective:      Physical Exam  Constitutional:       General: He is not in acute distress.     Appearance: He is well-developed. He is not diaphoretic.   HENT:      Head: Normocephalic and atraumatic.      Right Ear: External ear normal.      Left Ear: External ear normal.      Nose: Nose normal.   Eyes:      General:       "   Right eye: No discharge.         Left eye: No discharge.   Pulmonary:      Effort: Pulmonary effort is normal. No respiratory distress.      Breath sounds: No stridor.   Musculoskeletal:         General: Normal range of motion.      Cervical back: Normal range of motion.   Skin:     Coloration: Skin is not pale.   Neurological:      Mental Status: He is alert and oriented to person, place, and time.      Motor: No abnormal muscle tone.      Coordination: Coordination normal.   Psychiatric:         Behavior: Behavior normal.         Thought Content: Thought content normal.         Judgment: Judgment normal.           Assessment / Plan:     Type 1 diabetes mellitus with hyperglycemia, with long-term current use of insulin  -     Hemoglobin A1C; Future; Expected date: 02/11/2025      Additional Plan Details:    - POCT Glucose  - Encouraged continuation of lifestyle changes including regular exercise and limiting carbohydrates to 30-45 grams per meal threes times daily and 15 grams per snack with a limit of two daily.   - Encouraged continued monitoring of blood glucose with maintenance of 4 times daily and Continuously with personal CGM Dexcom.   - Current DM Medication Regimen:Continue Tresiba 12 units daily. Continue Inpen Lispro IC 10 CF 50 . Log doses - will plan for afrezza.   - A1c to be sched  - Health Maintenance standards addressed today: Flu Shot - [facility] and COVID - 19 Vaccine - patient will schedule outside of [facility] .   - Nursing Visit: Patient is below goal range for nursing visit for age group and will not need nursing visit at this time .   - Follow up with me in 12 weeks with A1c prior.    INPEN SETTINGS:    Tresiba 12 units daily   Lispro IC 10 CF 50 Goal 100     Blakeney McKnight, PA-C [facility]

## 2025-04-25 ENCOUNTER — LAB VISIT (OUTPATIENT)
Dept: LAB | Facility: HOSPITAL | Age: 45
End: 2025-04-25
Attending: FAMILY MEDICINE
Payer: COMMERCIAL

## 2025-04-25 DIAGNOSIS — E10.65 TYPE 1 DIABETES MELLITUS WITH HYPERGLYCEMIA, WITH LONG-TERM CURRENT USE OF INSULIN: ICD-10-CM

## 2025-04-25 LAB
EAG (OHS): 123 MG/DL (ref 68–131)
HBA1C MFR BLD: 5.9 % (ref 4–5.6)

## 2025-04-25 PROCEDURE — 83036 HEMOGLOBIN GLYCOSYLATED A1C: CPT

## 2025-04-25 PROCEDURE — 36415 COLL VENOUS BLD VENIPUNCTURE: CPT | Mod: PO

## 2025-04-28 ENCOUNTER — RESULTS FOLLOW-UP (OUTPATIENT)
Dept: DIABETES | Facility: CLINIC | Age: 45
End: 2025-04-28

## 2025-05-06 ENCOUNTER — OFFICE VISIT (OUTPATIENT)
Dept: DIABETES | Facility: CLINIC | Age: 45
End: 2025-05-06
Payer: COMMERCIAL

## 2025-05-06 ENCOUNTER — PATIENT MESSAGE (OUTPATIENT)
Dept: DIABETES | Facility: CLINIC | Age: 45
End: 2025-05-06

## 2025-05-06 DIAGNOSIS — E10.65 TYPE 1 DIABETES MELLITUS WITH HYPERGLYCEMIA, WITH LONG-TERM CURRENT USE OF INSULIN: Primary | ICD-10-CM

## 2025-05-06 DIAGNOSIS — Z96.41 INSULIN PUMP IN PLACE: ICD-10-CM

## 2025-05-06 PROCEDURE — 99499 UNLISTED E&M SERVICE: CPT | Mod: 95,,, | Performed by: PHYSICIAN ASSISTANT

## 2025-05-06 NOTE — PROGRESS NOTES
Unable to see pt before he logged out. FRANCISCO Valiente PA-C, BC-ADM  [facility]

## 2025-05-07 ENCOUNTER — TELEPHONE (OUTPATIENT)
Dept: DIABETES | Facility: CLINIC | Age: 45
End: 2025-05-07
Payer: COMMERCIAL

## 2025-05-07 ENCOUNTER — OFFICE VISIT (OUTPATIENT)
Dept: DIABETES | Facility: CLINIC | Age: 45
End: 2025-05-07
Payer: COMMERCIAL

## 2025-05-07 DIAGNOSIS — E10.65 TYPE 1 DIABETES MELLITUS WITH HYPERGLYCEMIA, WITH LONG-TERM CURRENT USE OF INSULIN: Primary | ICD-10-CM

## 2025-05-07 DIAGNOSIS — Z96.41 INSULIN PUMP IN PLACE: ICD-10-CM

## 2025-05-07 NOTE — TELEPHONE ENCOUNTER
Spoke with pt to offer a virtual appointment with Shell today at 11 to discuss Afrezza. Pt accepted appointment and is now scheduled. He will see Shell via virtual today at 11.

## 2025-05-07 NOTE — TELEPHONE ENCOUNTER
----- Message from Shell Valiente PA-C sent at 5/7/2025  7:56 AM CDT -----  Tell him I tried to call and left him a [employer] message yesterday. Wanted to see if he wanted to resched virtual to today at 11a so I can discuss afrezza with him

## 2025-05-07 NOTE — PROGRESS NOTES
PCP: Lena Go MD    Subjective:     Chief Complaint: Diabetes    TELEMEDICINE VISIT:     The patient location is: Home  The chief complaint leading to consultation is: Diabetes Follow up  Visit type: Virtual visit with synchronous audio and video  Each patient to whom he or she provides medical services by telemedicine is:  (1) informed of the relationship between the physician and patient and the respective role of any other health care provider with respect to management of the patient; and (2) notified that he or she may decline to receive medical services by telemedicine and may withdraw from such care at any time.    Notes: N/A      HISTORY OF PRESENT ILLNESS: 44 y.o.   male presenting for diabetes visit.   The patient's last visit with me was on 5/6/2025.   Patient has had Type I diabetes since age 15.  Pertinent to decision making is the following comorbidities: N/A  Patient has the following Diabetes complications: without complications  He  has attended diabetes education in the past.     Patient's most recent A1c of 5.9% was completed 1 months ago.   Patient states since His last A1c His blood glucose levels have been both high and low throughout the day .   Patient monitors blood glucose 4 times per day and Continuously with personal CGM Dexcom.   Patient blood glucose monitoring device will be uploaded into [employer] today.             Patient's records show some baseline euglycemia with appropriate coverage of bolusing with IC 10. Some hypoglycemia with insulin stacking.   Patient endorses the following diabetes related symptoms: None.  Patient is due today for the following diabetes-related health maintenance standards: Influenza Vaccine and COVID-19 Vaccine .   He denies recent hospital admissions or emergency room visits.   He voices having hypoglycemia as above.  Patient's concerns today include glycemic control. Of note, pt is using inpen but manual dosage amounts. Pt was  "previously using  Tandem pump break but stopped secondary to alarm fatigue. Interested in Afrezza - will set up FEV1 in clinic.   Patient medication regimen is as below.     CURRENT DM MEDICATIONS:   Tresiba 12 units daily   Inpen Lispro IC 15 CF 50 Goal 100     Other Pump Considerations:   Current Glucagon Kit: yes  Current Ketone Strips: yes      Labs Reviewed.       No results found for: "CPEPTIDE"  No results found for: "GLUTAMICACID"       //   , There is no height or weight on file to calculate BMI.  His blood sugar in clinic today is:    No components found for: "POCGLUC"      Review of Systems   Constitutional:  Negative for activity change, appetite change, chills and fever.   HENT:  Negative for dental problem, mouth sores, nosebleeds, sore throat and trouble swallowing.    Eyes:  Negative for pain and discharge.   Respiratory:  Negative for shortness of breath, wheezing and stridor.    Cardiovascular:  Negative for chest pain, palpitations and leg swelling.   Gastrointestinal:  Negative for abdominal pain, diarrhea, nausea and vomiting.   Endocrine: Negative for polydipsia, polyphagia and polyuria.   Genitourinary:  Negative for dysuria, frequency and urgency.   Musculoskeletal:  Negative for joint swelling and myalgias.   Skin:  Negative for rash and wound.   Neurological:  Negative for dizziness, syncope, weakness and headaches.   Psychiatric/Behavioral:  Negative for behavioral problems and dysphoric mood.          Diabetes Management Status  Statin: Taking  ACE/ARB: Taking    Screening or Prevention Patient's value Goal Complete/Controlled?   HgA1C Testing and Control   Lab Results   Component Value Date    HGBA1C 5.9 (H) 04/25/2025      Annually/Less than 8% Yes   Lipid profile : 10/25/2024 Annually Yes   LDL control Lab Results   Component Value Date    LDLCALC 108.0 10/25/2024    Annually/Less than 100 mg/dl  No   Nephropathy screening Lab Results   Component Value Date    MICALBCREAT Unable to " "calculate 10/25/2024     No results found for: "PROTEINUA" Annually Yes   Blood pressure BP Readings from Last 1 Encounters:   10/23/24 132/83    Less than 140/90 Yes   Dilated retinal exam : 05/24/2024 Annually Yes    Foot exam   : 05/08/2024 Annually Yes     Social History     Socioeconomic History    Marital status:    Tobacco Use    Smoking status: Never    Smokeless tobacco: Never   Substance and Sexual Activity    Alcohol use: No    Drug use: No    Sexual activity: Yes     Partners: Female   Social History Narrative    The patient is  and has 3 children.  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     Social Drivers of Health     Financial Resource Strain: Low Risk  (7/8/2019)    Overall Financial Resource Strain (CARDIA)     Difficulty of Paying Living Expenses: Not very hard   Food Insecurity: No Food Insecurity (7/8/2019)    Hunger Vital Sign     Worried About Running Out of Food in the Last Year: Never true     Ran Out of Food in the Last Year: Never true   Transportation Needs: No Transportation Needs (7/8/2019)    PRAPARE - Transportation     Lack of Transportation (Medical): No     Lack of Transportation (Non-Medical): No   Physical Activity: Unknown (7/14/2020)    Exercise Vital Sign     Minutes of Exercise per Session: 20 min   Stress: No Stress Concern Present (7/14/2020)    [employer] [place] [employer] - Occupational Stress Questionnaire     Feeling of Stress : Only a little     Past Medical History:   Diagnosis Date    Diabetes type 1, controlled 1998    Hx of Bell's palsy     Non-seasonal allergic rhinitis     Tinea versicolor        Objective:      Physical Exam  Constitutional:       General: He is not in acute distress.     Appearance: He is well-developed. He is not diaphoretic.   HENT:      Head: Normocephalic and atraumatic.      Right Ear: External ear normal.      Left Ear: External ear normal.      Nose: Nose normal.   Eyes:      General:         Right eye: No discharge.    "      Left eye: No discharge.   Pulmonary:      Effort: Pulmonary effort is normal. No respiratory distress.      Breath sounds: No stridor.   Musculoskeletal:         General: Normal range of motion.      Cervical back: Normal range of motion.   Skin:     Coloration: Skin is not pale.   Neurological:      Mental Status: He is alert and oriented to person, place, and time.      Motor: No abnormal muscle tone.      Coordination: Coordination normal.   Psychiatric:         Behavior: Behavior normal.         Thought Content: Thought content normal.         Judgment: Judgment normal.         Assessment / Plan:     Type 1 diabetes mellitus with hyperglycemia, with long-term current use of insulin    Insulin pump in place      Additional Plan Details:    - POCT Glucose  - Encouraged continuation of lifestyle changes including regular exercise and limiting carbohydrates to 30-45 grams per meal threes times daily and 15 grams per snack with a limit of two daily.   - Encouraged continued monitoring of blood glucose with maintenance of 4 times daily and Continuously with personal CGM Dexcom.   - Current DM Medication Regimen:Continue Tresiba 12 units daily. Continue Inpen Lispro IC 10 CF 50 . Log doses - will plan for afrezza.   - [place] for FEV1 and Afrezza training  - Health Maintenance standards addressed today: Foot Exam - deferred by patient today because Telemedicine or Telephone visit, Eye Exam - will be completed within [facility] and scheduled today, COVID - 19 Vaccine - patient will schedule outside of [facility] , and pneumonia vaccine.   - Nursing Visit: Patient is below goal range for nursing visit for age group and will not need nursing visit at this time .   - Follow up with me in 6 weeks with A1c prior.    INPEN SETTINGS:    Tresiba 12 units daily   Lispro IC 10 CF 50 Goal 100     Shell Valiente PA-C  [facility]      ADDENDUM 5/9/25:     FEV-1 result: 3.52     All rapid acting  injectable insulins would be contraindicated in this patient due to Heightened risk of hypoglycemia and frequency.  Afrezza is different because it is an inhaled insulin and is medically necessary for this patient.  Afrezza is recommended by the Standards of Care guidelines for 2025 due to the above.

## 2025-05-09 ENCOUNTER — TELEPHONE (OUTPATIENT)
Dept: DIABETES | Facility: CLINIC | Age: 45
End: 2025-05-09

## 2025-05-09 ENCOUNTER — CLINICAL SUPPORT (OUTPATIENT)
Dept: DIABETES | Facility: CLINIC | Age: 45
End: 2025-05-09
Payer: COMMERCIAL

## 2025-05-09 VITALS
RESPIRATION RATE: 22 BRPM | OXYGEN SATURATION: 98 % | BODY MASS INDEX: 26.73 KG/M2 | WEIGHT: 213.88 LBS | HEART RATE: 66 BPM | DIASTOLIC BLOOD PRESSURE: 79 MMHG | TEMPERATURE: 99 F | SYSTOLIC BLOOD PRESSURE: 114 MMHG

## 2025-05-09 DIAGNOSIS — E10.65 TYPE 1 DIABETES MELLITUS WITH HYPERGLYCEMIA, WITH LONG-TERM CURRENT USE OF INSULIN: Primary | ICD-10-CM

## 2025-05-09 PROCEDURE — 99999 PR PBB SHADOW E&M-EST. PATIENT-LVL III: CPT | Mod: PBBFAC,,,

## 2025-05-09 RX ORDER — INSULIN HUMAN 4-8-12(60)
4-16 KIT INHALATION
Qty: 180 EACH | Refills: 3 | Status: SHIPPED | OUTPATIENT
Start: 2025-05-09 | End: 2025-06-08

## 2025-05-09 NOTE — TELEPHONE ENCOUNTER
Attempted to complete a PA for AFREZZA via phone with [employer], but this med is a plan exclusion. Spoke with Tho hawk, advised to wait for the PA request from [employer], and send it back with chart notes to try to get approved. Shell made aware.

## 2025-05-09 NOTE — PROGRESS NOTES
Patient is here today for FEV1 and Afrezza Education     FEV-1 machine was prepped prior to patient procedure by cleaning with a bacteriocidal SaniCloth wipe and was allowed to sit for 2 minutes to allow for time for disinfection.  FEV-1 machine was turned on. Filtered mouthpiece was opened and placed on FEV-1 machine.   Patient was instructed to take FEV-1 machine, place mouth on filtered mouthpiece, and  as forcefully as possible.     Patient performed task x3 with the highest value recorded.     FEV-1 result: 3.52    Following FEV-1 procedure, filtered mouthpiece was removed and discarded. FEV-1 machine was cleaned with a bacteriocidal SaniCloth wipe and was allowed to sit for 2 minutes to allow for time for disinfection.    Following procedure, patient was educated on Afrezza inhalant insulin powder. Instructed patient on appropriate inhalation technique and storage of insulin cartridges. Patient was provided with either printed patient education or the digital link to patient education as following: [email or web address]-[email or web address].     Instructed patient and demonstrated the following steps when using inhaled insulin:      - Remove insulin cartridge from refrigerator or at room temperature if less than 3 days out of the refrigerator.    - Allow insulin cartridge and inhaler to sit at room temperature 10 minutes prior to inhalation if out of the refrigerator.   - Select cartridge for intended dose (4u, 8u, or 12u cartridge). If dosage is higher than 12 units, patient will need multiple cartridges to take appropriate dose.   - HOLD INHALER: Hold the inhaler level in one hand with the white mouthpiece on the top and purple base on the bottom.  - OPEN INHALER: Open the inhaler by lifting the white mouthpiece to a vertical position.  - PLACE CARTRIDGE: Hold the cartridge with the cup facing down. The pointed end of the cartridge should  line up with the pointed end in the inhaler. Place cartridge into the inhaler, making sure it lies flat in inhaler.  - KEEP LEVEL: Now that the cartridge is loaded, keep the inhaler level from this point forward to avoid loss of drug powder. Do not turn it upside down, shake, or drop, as this could cause a loss of drug powder.  - CLOSE INHALER: Lower the mouthpiece to close the inhaler (this will open the drug cartridge). You should feel a snap when the inhaler is closed.  - Take a small sip of water to decrease coughing side effect prior to inhalation.   - REMOVE COVER: Remove the mouthpiece cover. Keep the inhaler level.  - EXHALE: Hold the inhaler away from your mouth and exhale fully.  - POSITION INHALER: Keeping your head and inhaler level, place the mouthpiece in your mouth. Close your lips around the mouthpiece to form a seal. Tilt the inhaler downward while keeping your head level as shown.  - INHALE DEEPLY AND HOLD BREATH: With your mouth closed around the mouthpiece, inhale deeply through the inhaler. Hold your breath for as long as comfortable and at the same time remove the inhaler from your mouth. Exhale and breathe normally.   - Take a small sip of water to decrease coughing side effect following inhalation.   - REPLACE COVER: Put the mouthpiece cover back onto the inhaler.  - OPEN INHALER: Open the inhaler by lifting the white mouthpiece.  - REMOVE CARTRIDGE: Remove the cartridge from the purple base.  - DISCARD: Throw away the cartridge in your regular household trash. Repeat steps 2 through 4 for each Afrezza cartridge you need for your dose.    Patient demonstrated inhalation technique in teach back method. Questions were answered.     Completed by:   Lolis GARCIA LPN

## 2025-05-09 NOTE — TELEPHONE ENCOUNTER
"----- Message from Shell Valiente PA-C sent at 5/9/2025  3:59 PM CDT -----  6 week virtual "Inpen sharing + Dex sharing"   Afrezza PA needed   "

## 2025-05-15 ENCOUNTER — TELEPHONE (OUTPATIENT)
Dept: DIABETES | Facility: CLINIC | Age: 45
End: 2025-05-15
Payer: COMMERCIAL

## 2025-05-15 NOTE — TELEPHONE ENCOUNTER
Spoke with Poonam from [facility], she states she is aware that the pt's PA for Afrezza was denied citing a plan exclusion. She asked if this office could complete a [employer] form and send back so that she could pursue other avenues to get the med approved. She will send over a blank form. Form will be returned upon completion.

## 2025-05-15 NOTE — TELEPHONE ENCOUNTER
----- Message from Kristen sent at 5/15/2025  2:11 PM CDT -----  Regarding: Medical Advice  Contact: Poonam  Type:  Needs Medical AdviceWho Called: JulieSymptoms (please be specific):  How long has patient had these symptoms:  Pharmacy name and phone #:  [facility] - [address] [address]Nejvj: [phone] Fax: [phone] Would the patient rather a call back or a response via My [facility]? callFeedtrace Call Back Number: [phone] Additional Information:  Poonam a Field  for RX: Afrezza and is requesting a callback from the nurse today but could not fully authenticate the patient information. Poonam received the prescription and need the prescription drug information and the Afrezza questionnaire to be completed to complete the PA. Please fax to the number listed above.

## 2025-05-19 ENCOUNTER — TELEPHONE (OUTPATIENT)
Dept: DIABETES | Facility: CLINIC | Age: 45
End: 2025-05-19
Payer: COMMERCIAL

## 2025-05-21 NOTE — TELEPHONE ENCOUNTER
----- Message from Romulo Torres LPN sent at 8/6/2019 11:03 AM CDT -----  Regarding: FW: Medical Necessity Form & Chart Notes  The chart notes need to be adjusted from December 2018.  ----- Message -----  From: Romulo Torres LPN  Sent: 8/5/2019   3:44 PM  To: Romulo Torres LPN  Subject: Medical Necessity Form & Chart Notes             Told by Chantelle that patient's medical necessity form was missing date of last visit, method of delivery, if the pt has had diabetes education within the last 6 months, and the prescription start date. For the chart notes they are missing 4x testing, 3 multiple daily injections, and if the patient has hypoglycemia. She will fax over the completed form to us to correct and send back with updated chart notes./LA    
Patient needs appointment.  
Yes

## 2025-05-23 NOTE — TELEPHONE ENCOUNTER
Additional fax received from [employer]. Appeal has been submitted  Request ID: [id number]  Note: Insurance has up to 30 days to respond

## 2025-06-23 ENCOUNTER — OFFICE VISIT (OUTPATIENT)
Dept: DIABETES | Facility: CLINIC | Age: 45
End: 2025-06-23
Payer: COMMERCIAL

## 2025-06-23 DIAGNOSIS — E10.65 TYPE 1 DIABETES MELLITUS WITH HYPERGLYCEMIA, WITH LONG-TERM CURRENT USE OF INSULIN: Primary | ICD-10-CM

## 2025-06-23 PROCEDURE — G2211 COMPLEX E/M VISIT ADD ON: HCPCS | Mod: 95,,, | Performed by: PHYSICIAN ASSISTANT

## 2025-06-23 PROCEDURE — 98006 SYNCH AUDIO-VIDEO EST MOD 30: CPT | Mod: 95,,, | Performed by: PHYSICIAN ASSISTANT

## 2025-06-23 PROCEDURE — 3044F HG A1C LEVEL LT 7.0%: CPT | Mod: CPTII,95,, | Performed by: PHYSICIAN ASSISTANT

## 2025-06-23 PROCEDURE — 3072F LOW RISK FOR RETINOPATHY: CPT | Mod: CPTII,95,, | Performed by: PHYSICIAN ASSISTANT

## 2025-06-23 PROCEDURE — 95251 CONT GLUC MNTR ANALYSIS I&R: CPT | Mod: NDTC,,, | Performed by: PHYSICIAN ASSISTANT

## 2025-06-23 NOTE — Clinical Note
"8 week virtual "Inpen sharing + Dex sharing"  4 mo in person at 8a "Inpen sharing + Dex sharing"  "

## 2025-06-23 NOTE — PROGRESS NOTES
PCP: Lena Go MD    Subjective:     Chief Complaint: Diabetes    TELEMEDICINE VISIT:     The patient location is: Home  The chief complaint leading to consultation is: Diabetes Follow up  Visit type: Virtual visit with synchronous audio and video  Each patient to whom he or she provides medical services by telemedicine is:  (1) informed of the relationship between the physician and patient and the respective role of any other health care provider with respect to management of the patient; and (2) notified that he or she may decline to receive medical services by telemedicine and may withdraw from such care at any time.    Notes: N/A      HISTORY OF PRESENT ILLNESS: 44 y.o.   male presenting for diabetes visit.   The patient's last visit with me was on 5/7/2025.   Patient has had Type I diabetes since age 15.  Pertinent to decision making is the following comorbidities: N/A  Patient has the following Diabetes complications: without complications  He  has attended diabetes education in the past.     Patient's most recent A1c of 5.9% was completed 2 months ago.   Patient states since His last A1c His blood glucose levels have been both high and low throughout the day .   Patient monitors blood glucose 4 times per day and Continuously with personal CGM Dexcom.   Patient blood glucose monitoring device will be uploaded into [employer] [facility] today.             Patient's records show some baseline euglycemia with appropriate coverage of bolusing with IC 10. Some hypoglycemia with insulin stacking. Some mild rise in baseline BG overnight related to suspected oxana phenomemon.  Patient endorses the following diabetes related symptoms: None.  Patient is due today for the following diabetes-related health maintenance standards: Foot Exam , Eye Exam, COVID-19 Vaccine , and pneumonia vaccine.   He denies recent hospital admissions or emergency room visits.   He voices having hypoglycemia as above.  Patient's  "concerns today include glycemic control. Of note, pt is using inpen but manual dosage amounts. Pt was previously using  Tandem pump break but stopped secondary to alarm fatigue. Was Interested in Afrezza but insurance does not currently cover and uncovered cost was too high.   Patient medication regimen is as below.     CURRENT DM MEDICATIONS:   Tresiba 12 units daily   Inpen Lispro IC 10 CF 50 Goal 100     Other Pump Considerations:   Current Glucagon Kit: yes  Current Ketone Strips: yes      Labs Reviewed.       No results found for: "CPEPTIDE"  No results found for: "GLUTAMICACID"       //   , There is no height or weight on file to calculate BMI.  His blood sugar in clinic today is:    No components found for: "POCGLUC"      Review of Systems   Constitutional:  Negative for activity change, appetite change, chills and fever.   HENT:  Negative for dental problem, mouth sores, nosebleeds, sore throat and trouble swallowing.    Eyes:  Negative for pain and discharge.   Respiratory:  Negative for shortness of breath, wheezing and stridor.    Cardiovascular:  Negative for chest pain, palpitations and leg swelling.   Gastrointestinal:  Negative for abdominal pain, diarrhea, nausea and vomiting.   Endocrine: Negative for polydipsia, polyphagia and polyuria.   Genitourinary:  Negative for dysuria, frequency and urgency.   Musculoskeletal:  Negative for joint swelling and myalgias.   Skin:  Negative for rash and wound.   Neurological:  Negative for dizziness, syncope, weakness and headaches.   Psychiatric/Behavioral:  Negative for behavioral problems and dysphoric mood.          Diabetes Management Status  Statin: Taking  ACE/ARB: Taking    Screening or Prevention Patient's value Goal Complete/Controlled?   HgA1C Testing and Control   Lab Results   Component Value Date    HGBA1C 5.9 (H) 04/25/2025      Annually/Less than 8% Yes   Lipid profile : 10/25/2024 Annually Yes   LDL control Lab Results   Component Value Date    " "LDLCALC 108.0 10/25/2024    Annually/Less than 100 mg/dl  No   Nephropathy screening Lab Results   Component Value Date    MICALBCREAT Unable to calculate 10/25/2024     No results found for: "PROTEINUA" Annually Yes   Blood pressure BP Readings from Last 1 Encounters:   05/09/25 114/79    Less than 140/90 Yes   Dilated retinal exam : 05/24/2024 Annually Yes    Foot exam   : 05/08/2024 Annually Yes     Social History     Socioeconomic History    Marital status:    Tobacco Use    Smoking status: Never    Smokeless tobacco: Never   Substance and Sexual Activity    Alcohol use: No    Drug use: No    Sexual activity: Yes     Partners: Female   Social History Narrative    The patient is  and has 3 children.  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     Social Drivers of Health     Financial Resource Strain: Low Risk  (7/8/2019)    Overall Financial Resource Strain (CARDIA)     Difficulty of Paying Living Expenses: Not very hard   Food Insecurity: No Food Insecurity (7/8/2019)    Hunger Vital Sign     Worried About Running Out of Food in the Last Year: Never true     Ran Out of Food in the Last Year: Never true   Transportation Needs: No Transportation Needs (7/8/2019)    PRAPARE - Transportation     Lack of Transportation (Medical): No     Lack of Transportation (Non-Medical): No   Physical Activity: Unknown (7/14/2020)    Exercise Vital Sign     Minutes of Exercise per Session: 20 min   Stress: No Stress Concern Present (7/14/2020)    [employer] - Occupational Stress Questionnaire     Feeling of Stress : Only a little     Past Medical History:   Diagnosis Date    Diabetes type 1, controlled 1998    Hx of Bell's palsy     Non-seasonal allergic rhinitis     Tinea versicolor        Objective:      Physical Exam  Constitutional:       General: He is not in acute distress.     Appearance: He is well-developed. He is not diaphoretic.   HENT:      Head: Normocephalic and atraumatic.      " Right Ear: External ear normal.      Left Ear: External ear normal.      Nose: Nose normal.   Eyes:      General:         Right eye: No discharge.         Left eye: No discharge.   Pulmonary:      Effort: Pulmonary effort is normal. No respiratory distress.      Breath sounds: No stridor.   Musculoskeletal:         General: Normal range of motion.      Cervical back: Normal range of motion.   Skin:     Coloration: Skin is not pale.   Neurological:      Mental Status: He is alert and oriented to person, place, and time.      Motor: No abnormal muscle tone.      Coordination: Coordination normal.   Psychiatric:         Behavior: Behavior normal.         Thought Content: Thought content normal.         Judgment: Judgment normal.           Assessment / Plan:     Type 1 diabetes mellitus with hyperglycemia, with long-term current use of insulin  -     metFORMIN (GLUCOPHAGE-XR) 500 MG ER 24hr tablet; Take 1 tablet (500 mg total) by mouth once daily.  Dispense: 30 tablet; Refill: 11      Additional Plan Details:    - POCT Glucose  - Encouraged continuation of lifestyle changes including regular exercise and limiting carbohydrates to 30-45 grams per meal threes times daily and 15 grams per snack with a limit of two daily.   - Encouraged continued monitoring of blood glucose with maintenance of 4 times daily and Continuously with personal CGM Dexcom.   - Current DM Medication Regimen: Continue Tresiba 12 units daily. Continue Inpen Lispro IC 10 CF 50 . Start Metformin Xr 500 mg at night to help with oxana phenomenon insulin resistance - may need to decrease Tresiba to 11 unit daily. Monitor for GI. Pt understands this is an off label use of Metformin in T1d.   - Will defer Afrezza for now; recheck in the new year for coverage.   - Health Maintenance standards addressed today: Foot Exam - deferred by patient today because Telemedicine or Telephone visit, Eye Exam - will be completed within [facility] system and scheduled  today, COVID - 19 Vaccine - patient will schedule outside of [facility] , and pneumonia vaccine.   - Nursing Visit: Patient is below goal range for nursing visit for age group and will not need nursing visit at this time .   - Follow up with me in 6 weeks with A1c prior.    INPEN SETTINGS:    Tresiba 12 units daily   Lispro IC 10 CF 50 Goal 100     Blakeney McKnight, PA-C [facility]

## 2025-06-29 RX ORDER — METFORMIN HYDROCHLORIDE 500 MG/1
500 TABLET, EXTENDED RELEASE ORAL DAILY
Qty: 30 TABLET | Refills: 11 | Status: SHIPPED | OUTPATIENT
Start: 2025-06-29 | End: 2026-06-29

## 2025-06-29 NOTE — PATIENT INSTRUCTIONS
INPEN SETTINGS:    Tresiba 12 units daily - may need to dec to 11u daily with metformin start if hypoglycemia  Lispro IC 10 CF 50 Goal 100   Metformin Xr 500 mg at night before bed

## 2025-07-07 ENCOUNTER — PATIENT MESSAGE (OUTPATIENT)
Dept: FAMILY MEDICINE | Facility: CLINIC | Age: 45
End: 2025-07-07
Payer: COMMERCIAL

## 2025-07-07 DIAGNOSIS — Z00.00 PREVENTATIVE HEALTH CARE: Primary | ICD-10-CM

## 2025-07-08 ENCOUNTER — LAB VISIT (OUTPATIENT)
Dept: LAB | Facility: HOSPITAL | Age: 45
End: 2025-07-08
Attending: FAMILY MEDICINE
Payer: COMMERCIAL

## 2025-07-08 DIAGNOSIS — Z00.00 PREVENTATIVE HEALTH CARE: ICD-10-CM

## 2025-07-08 LAB — TESTOST SERPL-MCNC: 598 NG/DL (ref 304–1227)

## 2025-07-08 PROCEDURE — 84403 ASSAY OF TOTAL TESTOSTERONE: CPT

## 2025-07-08 PROCEDURE — 36415 COLL VENOUS BLD VENIPUNCTURE: CPT | Mod: PO

## 2025-08-12 ENCOUNTER — OFFICE VISIT (OUTPATIENT)
Dept: FAMILY MEDICINE | Facility: CLINIC | Age: 45
End: 2025-08-12
Payer: COMMERCIAL

## 2025-08-12 VITALS
OXYGEN SATURATION: 98 % | HEART RATE: 66 BPM | HEIGHT: 75 IN | WEIGHT: 207 LBS | BODY MASS INDEX: 25.74 KG/M2 | TEMPERATURE: 98 F | DIASTOLIC BLOOD PRESSURE: 72 MMHG | SYSTOLIC BLOOD PRESSURE: 106 MMHG

## 2025-08-12 DIAGNOSIS — E10.9 CONTROLLED DIABETES MELLITUS TYPE 1 WITHOUT COMPLICATIONS: ICD-10-CM

## 2025-08-12 DIAGNOSIS — J30.89 NON-SEASONAL ALLERGIC RHINITIS, UNSPECIFIED TRIGGER: ICD-10-CM

## 2025-08-12 DIAGNOSIS — J34.2 DEVIATED SEPTUM: ICD-10-CM

## 2025-08-12 DIAGNOSIS — J31.0 CHRONIC RHINITIS: Primary | ICD-10-CM

## 2025-08-12 PROCEDURE — 3008F BODY MASS INDEX DOCD: CPT | Mod: CPTII,S$GLB,, | Performed by: FAMILY MEDICINE

## 2025-08-12 PROCEDURE — G2211 COMPLEX E/M VISIT ADD ON: HCPCS | Mod: S$GLB,,, | Performed by: FAMILY MEDICINE

## 2025-08-12 PROCEDURE — 99214 OFFICE O/P EST MOD 30 MIN: CPT | Mod: S$GLB,,, | Performed by: FAMILY MEDICINE

## 2025-08-12 PROCEDURE — 99999 PR PBB SHADOW E&M-EST. PATIENT-LVL IV: CPT | Mod: PBBFAC,,, | Performed by: FAMILY MEDICINE

## 2025-08-12 PROCEDURE — 1160F RVW MEDS BY RX/DR IN RCRD: CPT | Mod: CPTII,S$GLB,, | Performed by: FAMILY MEDICINE

## 2025-08-12 PROCEDURE — 3072F LOW RISK FOR RETINOPATHY: CPT | Mod: CPTII,S$GLB,, | Performed by: FAMILY MEDICINE

## 2025-08-12 PROCEDURE — 3044F HG A1C LEVEL LT 7.0%: CPT | Mod: CPTII,S$GLB,, | Performed by: FAMILY MEDICINE

## 2025-08-12 PROCEDURE — 1159F MED LIST DOCD IN RCRD: CPT | Mod: CPTII,S$GLB,, | Performed by: FAMILY MEDICINE

## 2025-08-12 PROCEDURE — 3074F SYST BP LT 130 MM HG: CPT | Mod: CPTII,S$GLB,, | Performed by: FAMILY MEDICINE

## 2025-08-12 PROCEDURE — 3078F DIAST BP <80 MM HG: CPT | Mod: CPTII,S$GLB,, | Performed by: FAMILY MEDICINE

## 2025-08-26 ENCOUNTER — OFFICE VISIT (OUTPATIENT)
Dept: DIABETES | Facility: CLINIC | Age: 45
End: 2025-08-26
Payer: COMMERCIAL

## 2025-08-26 ENCOUNTER — TELEPHONE (OUTPATIENT)
Dept: OPHTHALMOLOGY | Facility: CLINIC | Age: 45
End: 2025-08-26
Payer: COMMERCIAL

## 2025-08-26 DIAGNOSIS — E10.65 TYPE 1 DIABETES MELLITUS WITH HYPERGLYCEMIA, WITH LONG-TERM CURRENT USE OF INSULIN: Primary | ICD-10-CM

## 2025-08-26 PROCEDURE — 95251 CONT GLUC MNTR ANALYSIS I&R: CPT | Mod: NDTC,,, | Performed by: PHYSICIAN ASSISTANT

## 2025-08-26 PROCEDURE — 98006 SYNCH AUDIO-VIDEO EST MOD 30: CPT | Mod: 95,,, | Performed by: PHYSICIAN ASSISTANT

## 2025-08-26 PROCEDURE — 3072F LOW RISK FOR RETINOPATHY: CPT | Mod: CPTII,95,, | Performed by: PHYSICIAN ASSISTANT

## 2025-08-26 PROCEDURE — 3044F HG A1C LEVEL LT 7.0%: CPT | Mod: CPTII,95,, | Performed by: PHYSICIAN ASSISTANT

## 2025-08-26 PROCEDURE — G2211 COMPLEX E/M VISIT ADD ON: HCPCS | Mod: 95,,, | Performed by: PHYSICIAN ASSISTANT
